# Patient Record
Sex: FEMALE | Race: WHITE | Employment: OTHER | URBAN - METROPOLITAN AREA
[De-identification: names, ages, dates, MRNs, and addresses within clinical notes are randomized per-mention and may not be internally consistent; named-entity substitution may affect disease eponyms.]

---

## 2021-01-01 ENCOUNTER — APPOINTMENT (OUTPATIENT)
Dept: GENERAL RADIOLOGY | Age: 71
End: 2021-01-01
Attending: UROLOGY
Payer: COMMERCIAL

## 2021-01-01 ENCOUNTER — HOSPITAL ENCOUNTER (OUTPATIENT)
Age: 71
Setting detail: OBSERVATION
Discharge: HOME | End: 2021-12-31
Attending: UROLOGY | Admitting: UROLOGY
Payer: COMMERCIAL

## 2021-01-01 ENCOUNTER — HOSPITAL ENCOUNTER (OUTPATIENT)
Age: 71
Setting detail: OUTPATIENT SURGERY
Discharge: HOME | End: 2021-12-03
Attending: UROLOGY | Admitting: UROLOGY
Payer: COMMERCIAL

## 2021-01-01 ENCOUNTER — HOSPITAL ENCOUNTER (OUTPATIENT)
Dept: LAB | Age: 71
Discharge: HOME | End: 2021-12-17
Payer: COMMERCIAL

## 2021-01-01 ENCOUNTER — ANESTHESIA EVENT (OUTPATIENT)
Dept: SURGERY | Age: 71
End: 2021-01-01
Payer: COMMERCIAL

## 2021-01-01 ENCOUNTER — HOSPITAL ENCOUNTER (OUTPATIENT)
Dept: CT IMAGING | Age: 71
Discharge: HOME | End: 2021-12-28
Attending: UROLOGY
Payer: COMMERCIAL

## 2021-01-01 ENCOUNTER — ANESTHESIA (OUTPATIENT)
Dept: SURGERY | Age: 71
End: 2021-01-01
Payer: COMMERCIAL

## 2021-01-01 VITALS
HEART RATE: 77 BPM | BODY MASS INDEX: 21.13 KG/M2 | TEMPERATURE: 97.5 F | OXYGEN SATURATION: 95 % | RESPIRATION RATE: 18 BRPM | WEIGHT: 127 LBS | DIASTOLIC BLOOD PRESSURE: 50 MMHG | SYSTOLIC BLOOD PRESSURE: 98 MMHG

## 2021-01-01 VITALS
HEART RATE: 57 BPM | OXYGEN SATURATION: 95 % | RESPIRATION RATE: 16 BRPM | BODY MASS INDEX: 23.38 KG/M2 | WEIGHT: 132 LBS | TEMPERATURE: 97.8 F | DIASTOLIC BLOOD PRESSURE: 52 MMHG | SYSTOLIC BLOOD PRESSURE: 95 MMHG

## 2021-01-01 DIAGNOSIS — D49.4 BLADDER TUMOR: ICD-10-CM

## 2021-01-01 DIAGNOSIS — C67.8 MALIGNANT NEOPLASM OF OVERLAPPING SITES OF BLADDER (HCC): ICD-10-CM

## 2021-01-01 LAB
ALBUMIN SERPL-MCNC: 2.8 G/DL (ref 3.2–4.6)
ALBUMIN/GLOB SERPL: 0.7 {RATIO} (ref 1.2–3.5)
ALP SERPL-CCNC: 69 U/L (ref 50–136)
ALT SERPL-CCNC: 11 U/L (ref 12–65)
ANION GAP SERPL CALC-SCNC: 3 MMOL/L (ref 7–16)
ANION GAP SERPL CALC-SCNC: 6 MMOL/L (ref 7–16)
ANION GAP SERPL CALC-SCNC: 7 MMOL/L (ref 7–16)
ANION GAP SERPL CALC-SCNC: 7 MMOL/L (ref 7–16)
AST SERPL-CCNC: 6 U/L (ref 15–37)
ATRIAL RATE: 87 BPM
BASOPHILS # BLD: 0 K/UL (ref 0–0.2)
BASOPHILS # BLD: 0.1 K/UL (ref 0–0.2)
BASOPHILS NFR BLD: 0 % (ref 0–2)
BASOPHILS NFR BLD: 1 % (ref 0–2)
BILIRUB SERPL-MCNC: 0.5 MG/DL (ref 0.2–1.1)
BUN SERPL-MCNC: 10 MG/DL (ref 8–23)
BUN SERPL-MCNC: 12 MG/DL (ref 8–23)
BUN SERPL-MCNC: 7 MG/DL (ref 8–23)
BUN SERPL-MCNC: 7 MG/DL (ref 8–23)
CALCIUM SERPL-MCNC: 8.8 MG/DL (ref 8.3–10.4)
CALCIUM SERPL-MCNC: 8.9 MG/DL (ref 8.3–10.4)
CALCIUM SERPL-MCNC: 9.2 MG/DL (ref 8.3–10.4)
CALCIUM SERPL-MCNC: 9.7 MG/DL (ref 8.3–10.4)
CALCULATED P AXIS, ECG09: 15 DEGREES
CALCULATED R AXIS, ECG10: -13 DEGREES
CALCULATED T AXIS, ECG11: 15 DEGREES
CHLORIDE SERPL-SCNC: 101 MMOL/L (ref 98–107)
CHLORIDE SERPL-SCNC: 102 MMOL/L (ref 98–107)
CHLORIDE SERPL-SCNC: 103 MMOL/L (ref 98–107)
CHLORIDE SERPL-SCNC: 104 MMOL/L (ref 98–107)
CO2 SERPL-SCNC: 27 MMOL/L (ref 21–32)
CO2 SERPL-SCNC: 28 MMOL/L (ref 21–32)
CO2 SERPL-SCNC: 28 MMOL/L (ref 21–32)
CO2 SERPL-SCNC: 30 MMOL/L (ref 21–32)
COVID-19 RAPID TEST, COVR: NOT DETECTED
CREAT SERPL-MCNC: 0.71 MG/DL (ref 0.6–1)
CREAT SERPL-MCNC: 0.74 MG/DL (ref 0.6–1)
CREAT SERPL-MCNC: 0.8 MG/DL (ref 0.6–1)
CREAT SERPL-MCNC: 0.95 MG/DL (ref 0.6–1)
DIAGNOSIS, 93000: NORMAL
DIFFERENTIAL METHOD BLD: ABNORMAL
DIFFERENTIAL METHOD BLD: ABNORMAL
EOSINOPHIL # BLD: 0 K/UL (ref 0–0.8)
EOSINOPHIL # BLD: 0.2 K/UL (ref 0–0.8)
EOSINOPHIL NFR BLD: 0 % (ref 0.5–7.8)
EOSINOPHIL NFR BLD: 2 % (ref 0.5–7.8)
ERYTHROCYTE [DISTWIDTH] IN BLOOD BY AUTOMATED COUNT: 15.9 % (ref 11.9–14.6)
ERYTHROCYTE [DISTWIDTH] IN BLOOD BY AUTOMATED COUNT: 15.9 % (ref 11.9–14.6)
ERYTHROCYTE [DISTWIDTH] IN BLOOD BY AUTOMATED COUNT: 16.1 % (ref 11.9–14.6)
ERYTHROCYTE [DISTWIDTH] IN BLOOD BY AUTOMATED COUNT: 16.6 % (ref 11.9–14.6)
GLOBULIN SER CALC-MCNC: 4.1 G/DL (ref 2.3–3.5)
GLUCOSE SERPL-MCNC: 115 MG/DL (ref 65–100)
GLUCOSE SERPL-MCNC: 169 MG/DL (ref 65–100)
GLUCOSE SERPL-MCNC: 92 MG/DL (ref 65–100)
GLUCOSE SERPL-MCNC: 93 MG/DL (ref 65–100)
HCT VFR BLD AUTO: 27.7 % (ref 35.8–46.3)
HCT VFR BLD AUTO: 31.1 % (ref 35.8–46.3)
HCT VFR BLD AUTO: 33.4 % (ref 35.8–46.3)
HCT VFR BLD AUTO: 33.9 % (ref 35.8–46.3)
HGB BLD-MCNC: 10.2 G/DL (ref 11.7–15.4)
HGB BLD-MCNC: 10.4 G/DL (ref 11.7–15.4)
HGB BLD-MCNC: 8.4 G/DL (ref 11.7–15.4)
HGB BLD-MCNC: 9.4 G/DL (ref 11.7–15.4)
IMM GRANULOCYTES # BLD AUTO: 0 K/UL (ref 0–0.5)
IMM GRANULOCYTES # BLD AUTO: 0 K/UL (ref 0–0.5)
IMM GRANULOCYTES NFR BLD AUTO: 0 % (ref 0–5)
IMM GRANULOCYTES NFR BLD AUTO: 0 % (ref 0–5)
LYMPHOCYTES # BLD: 0.5 K/UL (ref 0.5–4.6)
LYMPHOCYTES # BLD: 1.5 K/UL (ref 0.5–4.6)
LYMPHOCYTES NFR BLD: 16 % (ref 13–44)
LYMPHOCYTES NFR BLD: 5 % (ref 13–44)
MCH RBC QN AUTO: 24.5 PG (ref 26.1–32.9)
MCH RBC QN AUTO: 24.9 PG (ref 26.1–32.9)
MCH RBC QN AUTO: 25.1 PG (ref 26.1–32.9)
MCH RBC QN AUTO: 25.1 PG (ref 26.1–32.9)
MCHC RBC AUTO-ENTMCNC: 30.2 G/DL (ref 31.4–35)
MCHC RBC AUTO-ENTMCNC: 30.3 G/DL (ref 31.4–35)
MCHC RBC AUTO-ENTMCNC: 30.5 G/DL (ref 31.4–35)
MCHC RBC AUTO-ENTMCNC: 30.7 G/DL (ref 31.4–35)
MCV RBC AUTO: 80.1 FL (ref 79.6–97.8)
MCV RBC AUTO: 81.1 FL (ref 79.6–97.8)
MCV RBC AUTO: 82.9 FL (ref 79.6–97.8)
MCV RBC AUTO: 83.2 FL (ref 79.6–97.8)
MONOCYTES # BLD: 0.1 K/UL (ref 0.1–1.3)
MONOCYTES # BLD: 0.7 K/UL (ref 0.1–1.3)
MONOCYTES NFR BLD: 2 % (ref 4–12)
MONOCYTES NFR BLD: 7 % (ref 4–12)
NEUTS SEG # BLD: 7.3 K/UL (ref 1.7–8.2)
NEUTS SEG # BLD: 8.5 K/UL (ref 1.7–8.2)
NEUTS SEG NFR BLD: 75 % (ref 43–78)
NEUTS SEG NFR BLD: 93 % (ref 43–78)
NRBC # BLD: 0 K/UL (ref 0–0.2)
P-R INTERVAL, ECG05: 138 MS
PLATELET # BLD AUTO: 238 K/UL (ref 150–450)
PLATELET # BLD AUTO: 260 K/UL (ref 150–450)
PLATELET # BLD AUTO: 264 K/UL (ref 150–450)
PLATELET # BLD AUTO: 280 K/UL (ref 150–450)
PMV BLD AUTO: 9 FL (ref 9.4–12.3)
PMV BLD AUTO: 9.5 FL (ref 9.4–12.3)
PMV BLD AUTO: 9.5 FL (ref 9.4–12.3)
PMV BLD AUTO: 9.7 FL (ref 9.4–12.3)
POTASSIUM SERPL-SCNC: 3.2 MMOL/L (ref 3.5–5.1)
POTASSIUM SERPL-SCNC: 4 MMOL/L (ref 3.5–5.1)
POTASSIUM SERPL-SCNC: 4.3 MMOL/L (ref 3.5–5.1)
POTASSIUM SERPL-SCNC: 4.8 MMOL/L (ref 3.5–5.1)
PROT SERPL-MCNC: 6.9 G/DL (ref 6.3–8.2)
Q-T INTERVAL, ECG07: 368 MS
QRS DURATION, ECG06: 88 MS
QTC CALCULATION (BEZET), ECG08: 442 MS
RBC # BLD AUTO: 3.34 M/UL (ref 4.05–5.2)
RBC # BLD AUTO: 3.74 M/UL (ref 4.05–5.2)
RBC # BLD AUTO: 4.17 M/UL (ref 4.05–5.2)
RBC # BLD AUTO: 4.18 M/UL (ref 4.05–5.2)
SODIUM SERPL-SCNC: 135 MMOL/L (ref 136–145)
SODIUM SERPL-SCNC: 136 MMOL/L (ref 136–145)
SODIUM SERPL-SCNC: 137 MMOL/L (ref 136–145)
SODIUM SERPL-SCNC: 138 MMOL/L (ref 136–145)
SOURCE, COVRS: NORMAL
VENTRICULAR RATE, ECG03: 87 BPM
WBC # BLD AUTO: 10.2 K/UL (ref 4.3–11.1)
WBC # BLD AUTO: 13.2 K/UL (ref 4.3–11.1)
WBC # BLD AUTO: 9.2 K/UL (ref 4.3–11.1)
WBC # BLD AUTO: 9.8 K/UL (ref 4.3–11.1)

## 2021-01-01 PROCEDURE — 77030041444 HC ELECTRD PSS QUICK-FIRE PSSU -D: Performed by: UROLOGY

## 2021-01-01 PROCEDURE — 74011000250 HC RX REV CODE- 250: Performed by: NURSE ANESTHETIST, CERTIFIED REGISTERED

## 2021-01-01 PROCEDURE — G0378 HOSPITAL OBSERVATION PER HR: HCPCS

## 2021-01-01 PROCEDURE — 74011000258 HC RX REV CODE- 258: Performed by: UROLOGY

## 2021-01-01 PROCEDURE — 85027 COMPLETE CBC AUTOMATED: CPT

## 2021-01-01 PROCEDURE — 88307 TISSUE EXAM BY PATHOLOGIST: CPT

## 2021-01-01 PROCEDURE — 77030019927 HC TBNG IRR CYSTO BAXT -A: Performed by: UROLOGY

## 2021-01-01 PROCEDURE — 76060000034 HC ANESTHESIA 1.5 TO 2 HR: Performed by: UROLOGY

## 2021-01-01 PROCEDURE — 80048 BASIC METABOLIC PNL TOTAL CA: CPT

## 2021-01-01 PROCEDURE — 2709999900 HC NON-CHARGEABLE SUPPLY: Performed by: UROLOGY

## 2021-01-01 PROCEDURE — 71045 X-RAY EXAM CHEST 1 VIEW: CPT

## 2021-01-01 PROCEDURE — 77030019908 HC STETH ESOPH SIMS -A: Performed by: ANESTHESIOLOGY

## 2021-01-01 PROCEDURE — 74011250637 HC RX REV CODE- 250/637: Performed by: UROLOGY

## 2021-01-01 PROCEDURE — 74011250636 HC RX REV CODE- 250/636: Performed by: UROLOGY

## 2021-01-01 PROCEDURE — 74011250636 HC RX REV CODE- 250/636: Performed by: ANESTHESIOLOGY

## 2021-01-01 PROCEDURE — 88305 TISSUE EXAM BY PATHOLOGIST: CPT

## 2021-01-01 PROCEDURE — 74011250636 HC RX REV CODE- 250/636: Performed by: NURSE ANESTHETIST, CERTIFIED REGISTERED

## 2021-01-01 PROCEDURE — 77030005546 HC CATH URETH FOL 3W BARD -A: Performed by: UROLOGY

## 2021-01-01 PROCEDURE — 77030040361 HC SLV COMPR DVT MDII -B: Performed by: UROLOGY

## 2021-01-01 PROCEDURE — 74011250637 HC RX REV CODE- 250/637: Performed by: ANESTHESIOLOGY

## 2021-01-01 PROCEDURE — 77030040831 HC BAG URINE DRNG MDII -A: Performed by: UROLOGY

## 2021-01-01 PROCEDURE — 74011000250 HC RX REV CODE- 250: Performed by: UROLOGY

## 2021-01-01 PROCEDURE — 76210000016 HC OR PH I REC 1 TO 1.5 HR: Performed by: UROLOGY

## 2021-01-01 PROCEDURE — 77030010509 HC AIRWY LMA MSK TELE -A: Performed by: ANESTHESIOLOGY

## 2021-01-01 PROCEDURE — 77030008703 HC TU ET UNCUF COVD -A: Performed by: ANESTHESIOLOGY

## 2021-01-01 PROCEDURE — 77030018836 HC SOL IRR NACL ICUM -A: Performed by: UROLOGY

## 2021-01-01 PROCEDURE — 71260 CT THORAX DX C+: CPT

## 2021-01-01 PROCEDURE — 85025 COMPLETE CBC W/AUTO DIFF WBC: CPT

## 2021-01-01 PROCEDURE — 80053 COMPREHEN METABOLIC PANEL: CPT

## 2021-01-01 PROCEDURE — 36415 COLL VENOUS BLD VENIPUNCTURE: CPT

## 2021-01-01 PROCEDURE — 94640 AIRWAY INHALATION TREATMENT: CPT

## 2021-01-01 PROCEDURE — 93005 ELECTROCARDIOGRAM TRACING: CPT | Performed by: UROLOGY

## 2021-01-01 PROCEDURE — 76210000006 HC OR PH I REC 0.5 TO 1 HR: Performed by: UROLOGY

## 2021-01-01 PROCEDURE — 76010000171 HC OR TIME 2 TO 2.5 HR INTENSV-TIER 1: Performed by: UROLOGY

## 2021-01-01 PROCEDURE — 74011000636 HC RX REV CODE- 636: Performed by: UROLOGY

## 2021-01-01 PROCEDURE — 76010000149 HC OR TIME 1 TO 1.5 HR: Performed by: UROLOGY

## 2021-01-01 PROCEDURE — 77030039425 HC BLD LARYNG TRULITE DISP TELE -A: Performed by: ANESTHESIOLOGY

## 2021-01-01 PROCEDURE — 77030022427 HC ELECTRD RESCTCS CT STOR -C: Performed by: UROLOGY

## 2021-01-01 PROCEDURE — 2709999900 HC NON-CHARGEABLE SUPPLY

## 2021-01-01 PROCEDURE — 76060000035 HC ANESTHESIA 2 TO 2.5 HR: Performed by: UROLOGY

## 2021-01-01 PROCEDURE — 52240 CYSTOSCOPY AND TREATMENT: CPT | Performed by: UROLOGY

## 2021-01-01 PROCEDURE — 94664 DEMO&/EVAL PT USE INHALER: CPT

## 2021-01-01 PROCEDURE — 76210000021 HC REC RM PH II 0.5 TO 1 HR: Performed by: UROLOGY

## 2021-01-01 PROCEDURE — 87635 SARS-COV-2 COVID-19 AMP PRB: CPT

## 2021-01-01 PROCEDURE — 77030018726 HC ELECTRD BALL COAG STOR -C: Performed by: UROLOGY

## 2021-01-01 PROCEDURE — 77030040830 HC CATH URETH FOL MDII -A: Performed by: UROLOGY

## 2021-01-01 PROCEDURE — 74011000250 HC RX REV CODE- 250: Performed by: ANESTHESIOLOGY

## 2021-01-01 RX ORDER — MIDAZOLAM HYDROCHLORIDE 1 MG/ML
2 INJECTION, SOLUTION INTRAMUSCULAR; INTRAVENOUS ONCE
Status: DISCONTINUED | OUTPATIENT
Start: 2021-01-01 | End: 2021-01-01 | Stop reason: HOSPADM

## 2021-01-01 RX ORDER — AMOXICILLIN 250 MG
1 CAPSULE ORAL
Status: DISCONTINUED | OUTPATIENT
Start: 2021-01-01 | End: 2021-01-01 | Stop reason: HOSPADM

## 2021-01-01 RX ORDER — FAMOTIDINE 20 MG/1
20 TABLET, FILM COATED ORAL ONCE
Status: COMPLETED | OUTPATIENT
Start: 2021-01-01 | End: 2021-01-01

## 2021-01-01 RX ORDER — LIDOCAINE HYDROCHLORIDE 20 MG/ML
INJECTION, SOLUTION EPIDURAL; INFILTRATION; INTRACAUDAL; PERINEURAL AS NEEDED
Status: DISCONTINUED | OUTPATIENT
Start: 2021-01-01 | End: 2021-01-01 | Stop reason: HOSPADM

## 2021-01-01 RX ORDER — PROPOFOL 10 MG/ML
INJECTION, EMULSION INTRAVENOUS AS NEEDED
Status: DISCONTINUED | OUTPATIENT
Start: 2021-01-01 | End: 2021-01-01 | Stop reason: HOSPADM

## 2021-01-01 RX ORDER — NALOXONE HYDROCHLORIDE 0.4 MG/ML
0.2 INJECTION, SOLUTION INTRAMUSCULAR; INTRAVENOUS; SUBCUTANEOUS
Status: DISCONTINUED | OUTPATIENT
Start: 2021-01-01 | End: 2021-01-01 | Stop reason: HOSPADM

## 2021-01-01 RX ORDER — HYDROCODONE BITARTRATE AND ACETAMINOPHEN 5; 325 MG/1; MG/1
1 TABLET ORAL
Qty: 10 TABLET | Refills: 0 | Status: SHIPPED | OUTPATIENT
Start: 2021-01-01 | End: 2022-01-01

## 2021-01-01 RX ORDER — ROCURONIUM BROMIDE 10 MG/ML
INJECTION, SOLUTION INTRAVENOUS AS NEEDED
Status: DISCONTINUED | OUTPATIENT
Start: 2021-01-01 | End: 2021-01-01 | Stop reason: HOSPADM

## 2021-01-01 RX ORDER — SODIUM CHLORIDE 0.9 % (FLUSH) 0.9 %
5-40 SYRINGE (ML) INJECTION EVERY 8 HOURS
Status: DISCONTINUED | OUTPATIENT
Start: 2021-01-01 | End: 2021-01-01 | Stop reason: HOSPADM

## 2021-01-01 RX ORDER — OXYBUTYNIN CHLORIDE 5 MG/1
10 TABLET, EXTENDED RELEASE ORAL DAILY
Status: DISCONTINUED | OUTPATIENT
Start: 2021-01-01 | End: 2021-01-01 | Stop reason: HOSPADM

## 2021-01-01 RX ORDER — ESMOLOL HYDROCHLORIDE 10 MG/ML
INJECTION INTRAVENOUS AS NEEDED
Status: DISCONTINUED | OUTPATIENT
Start: 2021-01-01 | End: 2021-01-01 | Stop reason: HOSPADM

## 2021-01-01 RX ORDER — ALBUTEROL SULFATE 0.83 MG/ML
SOLUTION RESPIRATORY (INHALATION)
Status: DISCONTINUED
Start: 2021-01-01 | End: 2021-01-01 | Stop reason: WASHOUT

## 2021-01-01 RX ORDER — SODIUM CHLORIDE 0.9 % (FLUSH) 0.9 %
10 SYRINGE (ML) INJECTION
Status: COMPLETED | OUTPATIENT
Start: 2021-01-01 | End: 2021-01-01

## 2021-01-01 RX ORDER — HYDROMORPHONE HYDROCHLORIDE 2 MG/ML
0.5 INJECTION, SOLUTION INTRAMUSCULAR; INTRAVENOUS; SUBCUTANEOUS
Status: DISCONTINUED | OUTPATIENT
Start: 2021-01-01 | End: 2021-01-01 | Stop reason: HOSPADM

## 2021-01-01 RX ORDER — HYDROCODONE BITARTRATE AND ACETAMINOPHEN 5; 325 MG/1; MG/1
1 TABLET ORAL
Status: DISCONTINUED | OUTPATIENT
Start: 2021-01-01 | End: 2021-01-01 | Stop reason: HOSPADM

## 2021-01-01 RX ORDER — ONDANSETRON 2 MG/ML
INJECTION INTRAMUSCULAR; INTRAVENOUS AS NEEDED
Status: DISCONTINUED | OUTPATIENT
Start: 2021-01-01 | End: 2021-01-01 | Stop reason: HOSPADM

## 2021-01-01 RX ORDER — LIDOCAINE HYDROCHLORIDE 10 MG/ML
0.1 INJECTION INFILTRATION; PERINEURAL AS NEEDED
Status: DISCONTINUED | OUTPATIENT
Start: 2021-01-01 | End: 2021-01-01 | Stop reason: HOSPADM

## 2021-01-01 RX ORDER — ACETAMINOPHEN 325 MG/1
650 TABLET ORAL
Status: DISCONTINUED | OUTPATIENT
Start: 2021-01-01 | End: 2021-01-01 | Stop reason: HOSPADM

## 2021-01-01 RX ORDER — GENTAMICIN SULFATE 80 MG/100ML
80 INJECTION, SOLUTION INTRAVENOUS
Status: COMPLETED | OUTPATIENT
Start: 2021-01-01 | End: 2021-01-01

## 2021-01-01 RX ORDER — ONDANSETRON 2 MG/ML
4 INJECTION INTRAMUSCULAR; INTRAVENOUS
Status: DISCONTINUED | OUTPATIENT
Start: 2021-01-01 | End: 2021-01-01 | Stop reason: HOSPADM

## 2021-01-01 RX ORDER — DEXAMETHASONE SODIUM PHOSPHATE 100 MG/10ML
INJECTION INTRAMUSCULAR; INTRAVENOUS AS NEEDED
Status: DISCONTINUED | OUTPATIENT
Start: 2021-01-01 | End: 2021-01-01 | Stop reason: HOSPADM

## 2021-01-01 RX ORDER — ALBUTEROL SULFATE 2.5 MG/.5ML
2.5 SOLUTION RESPIRATORY (INHALATION)
Status: COMPLETED | OUTPATIENT
Start: 2021-01-01 | End: 2021-01-01

## 2021-01-01 RX ORDER — OXYCODONE HYDROCHLORIDE 5 MG/1
5 TABLET ORAL
Status: DISCONTINUED | OUTPATIENT
Start: 2021-01-01 | End: 2021-01-01 | Stop reason: HOSPADM

## 2021-01-01 RX ORDER — ATROPA BELLADONNA AND OPIUM 16.2; 6 MG/1; MG/1
SUPPOSITORY RECTAL AS NEEDED
Status: DISCONTINUED | OUTPATIENT
Start: 2021-01-01 | End: 2021-01-01 | Stop reason: HOSPADM

## 2021-01-01 RX ORDER — LEVOFLOXACIN 5 MG/ML
500 INJECTION, SOLUTION INTRAVENOUS ONCE
Status: COMPLETED | OUTPATIENT
Start: 2021-01-01 | End: 2021-01-01

## 2021-01-01 RX ORDER — EPHEDRINE SULFATE/0.9% NACL/PF 50 MG/5 ML
SYRINGE (ML) INTRAVENOUS AS NEEDED
Status: DISCONTINUED | OUTPATIENT
Start: 2021-01-01 | End: 2021-01-01 | Stop reason: HOSPADM

## 2021-01-01 RX ORDER — MIDAZOLAM HYDROCHLORIDE 1 MG/ML
2 INJECTION, SOLUTION INTRAMUSCULAR; INTRAVENOUS
Status: DISCONTINUED | OUTPATIENT
Start: 2021-01-01 | End: 2021-01-01 | Stop reason: HOSPADM

## 2021-01-01 RX ORDER — HALOPERIDOL 5 MG/ML
1 INJECTION INTRAMUSCULAR
Status: DISCONTINUED | OUTPATIENT
Start: 2021-01-01 | End: 2021-01-01 | Stop reason: HOSPADM

## 2021-01-01 RX ORDER — SODIUM CHLORIDE, SODIUM LACTATE, POTASSIUM CHLORIDE, CALCIUM CHLORIDE 600; 310; 30; 20 MG/100ML; MG/100ML; MG/100ML; MG/100ML
75 INJECTION, SOLUTION INTRAVENOUS CONTINUOUS
Status: DISCONTINUED | OUTPATIENT
Start: 2021-01-01 | End: 2021-01-01 | Stop reason: HOSPADM

## 2021-01-01 RX ORDER — SODIUM CHLORIDE 0.9 % (FLUSH) 0.9 %
5-40 SYRINGE (ML) INJECTION AS NEEDED
Status: DISCONTINUED | OUTPATIENT
Start: 2021-01-01 | End: 2021-01-01 | Stop reason: HOSPADM

## 2021-01-01 RX ORDER — ACETAMINOPHEN 500 MG
1000 TABLET ORAL ONCE
Status: COMPLETED | OUTPATIENT
Start: 2021-01-01 | End: 2021-01-01

## 2021-01-01 RX ORDER — DEXAMETHASONE SODIUM PHOSPHATE 4 MG/ML
INJECTION, SOLUTION INTRA-ARTICULAR; INTRALESIONAL; INTRAMUSCULAR; INTRAVENOUS; SOFT TISSUE AS NEEDED
Status: DISCONTINUED | OUTPATIENT
Start: 2021-01-01 | End: 2021-01-01 | Stop reason: HOSPADM

## 2021-01-01 RX ORDER — NALOXONE HYDROCHLORIDE 0.4 MG/ML
0.2 INJECTION, SOLUTION INTRAMUSCULAR; INTRAVENOUS; SUBCUTANEOUS AS NEEDED
Status: DISCONTINUED | OUTPATIENT
Start: 2021-01-01 | End: 2021-01-01 | Stop reason: HOSPADM

## 2021-01-01 RX ORDER — SODIUM CHLORIDE, SODIUM LACTATE, POTASSIUM CHLORIDE, CALCIUM CHLORIDE 600; 310; 30; 20 MG/100ML; MG/100ML; MG/100ML; MG/100ML
25 INJECTION, SOLUTION INTRAVENOUS CONTINUOUS
Status: DISCONTINUED | OUTPATIENT
Start: 2021-01-01 | End: 2021-01-01 | Stop reason: HOSPADM

## 2021-01-01 RX ORDER — LEVOFLOXACIN 5 MG/ML
500 INJECTION, SOLUTION INTRAVENOUS EVERY 24 HOURS
Status: DISCONTINUED | OUTPATIENT
Start: 2021-01-01 | End: 2021-01-01 | Stop reason: HOSPADM

## 2021-01-01 RX ORDER — OXYCODONE HYDROCHLORIDE 5 MG/1
10 TABLET ORAL
Status: DISCONTINUED | OUTPATIENT
Start: 2021-01-01 | End: 2021-01-01 | Stop reason: HOSPADM

## 2021-01-01 RX ORDER — FENTANYL CITRATE 50 UG/ML
100 INJECTION, SOLUTION INTRAMUSCULAR; INTRAVENOUS ONCE
Status: DISCONTINUED | OUTPATIENT
Start: 2021-01-01 | End: 2021-01-01 | Stop reason: HOSPADM

## 2021-01-01 RX ORDER — HYDROMORPHONE HYDROCHLORIDE 2 MG/ML
INJECTION, SOLUTION INTRAMUSCULAR; INTRAVENOUS; SUBCUTANEOUS AS NEEDED
Status: DISCONTINUED | OUTPATIENT
Start: 2021-01-01 | End: 2021-01-01 | Stop reason: HOSPADM

## 2021-01-01 RX ORDER — DIPHENHYDRAMINE HYDROCHLORIDE 50 MG/ML
12.5 INJECTION, SOLUTION INTRAMUSCULAR; INTRAVENOUS
Status: DISCONTINUED | OUTPATIENT
Start: 2021-01-01 | End: 2021-01-01 | Stop reason: HOSPADM

## 2021-01-01 RX ORDER — HYDROMORPHONE HYDROCHLORIDE 1 MG/ML
0.5 INJECTION, SOLUTION INTRAMUSCULAR; INTRAVENOUS; SUBCUTANEOUS
Status: DISCONTINUED | OUTPATIENT
Start: 2021-01-01 | End: 2021-01-01 | Stop reason: HOSPADM

## 2021-01-01 RX ADMIN — PROPOFOL 30 MG: 10 INJECTION, EMULSION INTRAVENOUS at 09:12

## 2021-01-01 RX ADMIN — ESMOLOL HYDROCHLORIDE 30 MG: 10 INJECTION, SOLUTION INTRAVENOUS at 09:13

## 2021-01-01 RX ADMIN — PHENYLEPHRINE HYDROCHLORIDE 100 MCG: 10 INJECTION INTRAVENOUS at 11:00

## 2021-01-01 RX ADMIN — Medication 10 ML: at 08:57

## 2021-01-01 RX ADMIN — DEXAMETHASONE SODIUM PHOSPHATE 4 MG: 4 INJECTION, SOLUTION INTRAMUSCULAR; INTRAVENOUS at 11:19

## 2021-01-01 RX ADMIN — ESMOLOL HYDROCHLORIDE 10 MG: 10 INJECTION, SOLUTION INTRAVENOUS at 09:31

## 2021-01-01 RX ADMIN — PROPOFOL 30 MG: 10 INJECTION, EMULSION INTRAVENOUS at 11:15

## 2021-01-01 RX ADMIN — PHENYLEPHRINE HYDROCHLORIDE 100 MCG: 10 INJECTION INTRAVENOUS at 10:27

## 2021-01-01 RX ADMIN — Medication 10 MG: at 10:42

## 2021-01-01 RX ADMIN — PHENYLEPHRINE HYDROCHLORIDE 100 MCG: 10 INJECTION INTRAVENOUS at 09:44

## 2021-01-01 RX ADMIN — PHENYLEPHRINE HYDROCHLORIDE 100 MCG: 10 INJECTION INTRAVENOUS at 10:12

## 2021-01-01 RX ADMIN — ROCURONIUM BROMIDE 30 MG: 50 INJECTION, SOLUTION INTRAVENOUS at 09:09

## 2021-01-01 RX ADMIN — HYDROMORPHONE HYDROCHLORIDE 0.5 MG: 1 INJECTION, SOLUTION INTRAMUSCULAR; INTRAVENOUS; SUBCUTANEOUS at 01:44

## 2021-01-01 RX ADMIN — Medication 10 ML: at 05:31

## 2021-01-01 RX ADMIN — FAMOTIDINE 20 MG: 20 TABLET ORAL at 07:59

## 2021-01-01 RX ADMIN — HYDROCODONE BITARTRATE AND ACETAMINOPHEN 1 TABLET: 5; 325 TABLET ORAL at 10:22

## 2021-01-01 RX ADMIN — Medication 10 ML: at 21:15

## 2021-01-01 RX ADMIN — IOPAMIDOL 100 ML: 755 INJECTION, SOLUTION INTRAVENOUS at 08:56

## 2021-01-01 RX ADMIN — PHENYLEPHRINE HYDROCHLORIDE 100 MCG: 10 INJECTION INTRAVENOUS at 10:46

## 2021-01-01 RX ADMIN — ONDANSETRON 4 MG: 2 INJECTION INTRAMUSCULAR; INTRAVENOUS at 11:27

## 2021-01-01 RX ADMIN — SUGAMMADEX 100 MG: 100 INJECTION, SOLUTION INTRAVENOUS at 11:02

## 2021-01-01 RX ADMIN — PROPOFOL 120 MG: 10 INJECTION, EMULSION INTRAVENOUS at 10:19

## 2021-01-01 RX ADMIN — DEXAMETHASONE SODIUM PHOSPHATE 10 MG: 10 INJECTION INTRAMUSCULAR; INTRAVENOUS at 09:18

## 2021-01-01 RX ADMIN — LIDOCAINE HYDROCHLORIDE 60 MG: 20 INJECTION, SOLUTION EPIDURAL; INFILTRATION; INTRACAUDAL; PERINEURAL at 09:09

## 2021-01-01 RX ADMIN — ONDANSETRON 4 MG: 2 INJECTION INTRAMUSCULAR; INTRAVENOUS at 09:39

## 2021-01-01 RX ADMIN — PROPOFOL 150 MG: 10 INJECTION, EMULSION INTRAVENOUS at 09:09

## 2021-01-01 RX ADMIN — Medication 10 MG: at 10:40

## 2021-01-01 RX ADMIN — HYDROMORPHONE HYDROCHLORIDE 0.5 MG: 1 INJECTION, SOLUTION INTRAMUSCULAR; INTRAVENOUS; SUBCUTANEOUS at 15:01

## 2021-01-01 RX ADMIN — SODIUM CHLORIDE 100 ML: 9 INJECTION, SOLUTION INTRAVENOUS at 08:57

## 2021-01-01 RX ADMIN — SUGAMMADEX 100 MG: 100 INJECTION, SOLUTION INTRAVENOUS at 11:05

## 2021-01-01 RX ADMIN — HYDROCODONE BITARTRATE AND ACETAMINOPHEN 1 TABLET: 5; 325 TABLET ORAL at 22:37

## 2021-01-01 RX ADMIN — DEXTROSE MONOHYDRATE: 25 INJECTION, SOLUTION INTRAVENOUS at 16:52

## 2021-01-01 RX ADMIN — PHENYLEPHRINE HYDROCHLORIDE 100 MCG: 10 INJECTION INTRAVENOUS at 10:36

## 2021-01-01 RX ADMIN — PHENYLEPHRINE HYDROCHLORIDE 100 MCG: 10 INJECTION INTRAVENOUS at 10:45

## 2021-01-01 RX ADMIN — GENTAMICIN SULFATE 80 MG: 80 INJECTION, SOLUTION INTRAVENOUS at 10:27

## 2021-01-01 RX ADMIN — HYDROMORPHONE HYDROCHLORIDE 0.5 MG: 1 INJECTION, SOLUTION INTRAMUSCULAR; INTRAVENOUS; SUBCUTANEOUS at 20:37

## 2021-01-01 RX ADMIN — GENTAMICIN SULFATE 80 MG: 80 INJECTION, SOLUTION INTRAVENOUS at 09:30

## 2021-01-01 RX ADMIN — ACETAMINOPHEN 1000 MG: 500 TABLET ORAL at 09:47

## 2021-01-01 RX ADMIN — PHENYLEPHRINE HYDROCHLORIDE 100 MCG: 10 INJECTION INTRAVENOUS at 10:37

## 2021-01-01 RX ADMIN — ALBUTEROL SULFATE 2.5 MG: 2.5 SOLUTION RESPIRATORY (INHALATION) at 08:48

## 2021-01-01 RX ADMIN — Medication 10 MG: at 10:46

## 2021-01-01 RX ADMIN — ROCURONIUM BROMIDE 10 MG: 50 INJECTION, SOLUTION INTRAVENOUS at 10:00

## 2021-01-01 RX ADMIN — CEFTRIAXONE SODIUM 2 G: 2 INJECTION, POWDER, FOR SOLUTION INTRAMUSCULAR; INTRAVENOUS at 10:12

## 2021-01-01 RX ADMIN — OXYBUTYNIN CHLORIDE 10 MG: 5 TABLET, EXTENDED RELEASE ORAL at 10:22

## 2021-01-01 RX ADMIN — DEXTROSE MONOHYDRATE: 25 INJECTION, SOLUTION INTRAVENOUS at 03:23

## 2021-01-01 RX ADMIN — HYDROMORPHONE HYDROCHLORIDE 0.5 MG: 2 INJECTION INTRAMUSCULAR; INTRAVENOUS; SUBCUTANEOUS at 09:21

## 2021-01-01 RX ADMIN — LEVOFLOXACIN 500 MG: 5 INJECTION, SOLUTION INTRAVENOUS at 09:02

## 2021-01-01 RX ADMIN — PHENYLEPHRINE HYDROCHLORIDE 100 MCG: 10 INJECTION INTRAVENOUS at 10:34

## 2021-01-01 RX ADMIN — ESMOLOL HYDROCHLORIDE 10 MG: 10 INJECTION, SOLUTION INTRAVENOUS at 10:01

## 2021-01-01 RX ADMIN — SODIUM CHLORIDE, SODIUM LACTATE, POTASSIUM CHLORIDE, AND CALCIUM CHLORIDE 75 ML/HR: 600; 310; 30; 20 INJECTION, SOLUTION INTRAVENOUS at 08:13

## 2021-01-01 RX ADMIN — LIDOCAINE HYDROCHLORIDE 60 MG: 20 INJECTION, SOLUTION EPIDURAL; INFILTRATION; INTRACAUDAL; PERINEURAL at 10:19

## 2021-01-01 RX ADMIN — PHENYLEPHRINE HYDROCHLORIDE 100 MCG: 10 INJECTION INTRAVENOUS at 10:41

## 2021-01-01 RX ADMIN — Medication 10 ML: at 15:03

## 2021-01-01 RX ADMIN — ROCURONIUM BROMIDE 10 MG: 50 INJECTION, SOLUTION INTRAVENOUS at 09:20

## 2021-01-01 RX ADMIN — SODIUM CHLORIDE, SODIUM LACTATE, POTASSIUM CHLORIDE, AND CALCIUM CHLORIDE 25 ML/HR: 600; 310; 30; 20 INJECTION, SOLUTION INTRAVENOUS at 09:30

## 2021-12-03 NOTE — PERIOP NOTES
Per Shea Spain MD to keep CBI off at this time to monitor bleeding. Pt to be observed for 1 hour at least and will reevaluated by Shea Spain MD for need of CBI and admittance or discharge.

## 2021-12-03 NOTE — ANESTHESIA POSTPROCEDURE EVALUATION
Procedure(s):  TRANSURETHRAL RESECTION OF BLADDER TUMOR WITH BIPOLAR/ MEAPLASTY/ MEATOTOMY. general    Anesthesia Post Evaluation      Multimodal analgesia: multimodal analgesia used between 6 hours prior to anesthesia start to PACU discharge  Patient location during evaluation: bedside  Patient participation: complete - patient participated  Level of consciousness: awake and alert  Pain score: 1  Pain management: adequate  Airway patency: patent  Anesthetic complications: no  Cardiovascular status: acceptable  Respiratory status: acceptable  Hydration status: acceptable  Comments: Pt doing well. Ok to d/c from an anesthetic prospective. Surgeon to decide if he feels pt needs to be an inpatient. No anesthetic related issues. Post anesthesia nausea and vomiting:  none  Final Post Anesthesia Temperature Assessment:  Normothermia (36.0-37.5 degrees C)      INITIAL Post-op Vital signs:   Vitals Value Taken Time   /51 12/03/21 1207   Temp 36.3 °C (97.4 °F) 12/03/21 1150   Pulse 81 12/03/21 1208   Resp 16 12/03/21 1150   SpO2 96 % 12/03/21 1208   Vitals shown include unvalidated device data.

## 2021-12-03 NOTE — ANESTHESIA PREPROCEDURE EVALUATION
Relevant Problems   No relevant active problems       Anesthetic History   No history of anesthetic complications            Review of Systems / Medical History  Patient summary reviewed and pertinent labs reviewed    Pulmonary        Sleep apnea: No treatment  Smoker (Former heavy smoker)         Neuro/Psych              Cardiovascular                  Exercise tolerance: >4 METS  Comments: Denies CP or recent changes in functional status  TTE 2019 with EF 45-50%;  Per pt, previously 20%     GI/Hepatic/Renal  Within defined limits              Endo/Other  Within defined limits           Other Findings   Comments: Bladder ca           Physical Exam    Airway  Mallampati: II  TM Distance: 4 - 6 cm  Neck ROM: normal range of motion   Mouth opening: Normal     Cardiovascular    Rhythm: regular  Rate: normal         Dental    Dentition: Poor dentition  Comments: VERY POOR DENTITION   Pulmonary      Decreased breath sounds: bilateral           Abdominal  GI exam deferred       Other Findings            Anesthetic Plan    ASA: 3  Anesthesia type: general          Induction: Intravenous  Anesthetic plan and risks discussed with: Patient and Spouse

## 2021-12-03 NOTE — H&P
[address] [facility]  HISTORY AND PHYSICAL    Name:  Camilo Johns  MR#:  [id number]  :  1950  ACCOUNT #:  [de-identified]  ADMIT DATE:  2021    HISTORY OF PRESENT ILLNESS:  The patient is a 42-year-old white female with a long history of tobacco abuse. The patient dates the onset of her problems to about one year ago when she was first treated for urinary tract infection. Her symptoms at that time were dysuria and hematuria. The patient continued to have progressively significant symptoms of lower abdominal pain and discomfort. On 2021, the patient underwent a contrasted abdominopelvic CT scan showing a 7.1 cm mass in the urinary bladder which enhanced in addition to an additional lesion in the right hemipelvis exterior to the urinary bladder which also enhanced. These lesions were as strongly suggestive of malignancy. On 2021, the patient was seen in my office. At that time, the patient had a palpable lower abdominal mass in the pelvis. On bimanual examination, the patient had a very large, tender mass present in the pelvis. A catheterized urine for residual yielded a lot of debris present in the urine in addition to RBCs and WBCs, TNTC. A culture on this urine was no growth. The patient states she has experienced a large weight loss over the past eight months. The patient complains of intermittent colicky-type pain in the pelvis and severe dysuria associated with urgency and urge incontinence, frequency and nocturia every hour. ALLERGIES:  DOXYCYCLINE, FENTANYL, MORPHINE. PRESCRIPTION MEDICATIONS:  None. PAST HISTORY:  The patient has had surgery on her left hand for removal of the index finger and transplantation of the left great toe to the left thumb base. She also has undergone cholecystectomy, and spontaneous passage of a urinary calculus. The patient states that she smoked for about 24 years up to one carton per day.     Additional past medical history, family history, review of systems, see records. PHYSICAL EXAMINATION:  VITAL SIGNS:  Blood pressure 106/76, weight 133, pulse 94. HEENT:  Physiologic except for oral cavity with teeth and very poor repair. NECK:  Supple. Thyroid not palpable. CHEST:  Clear to auscultation. HEART:  Regular without murmur. ABDOMEN:  The patient has no upper abdominal abnormalities. A large central suprapubic mass is palpable which the patient maintains is quite tender. No other masses are palpable. GENITALIA:  Normal female genitalia. Bimanual examination reveals a large pelvic mass which is fluctuant in nature. EXTREMITIES:  The left great toe is absent. The left hand shows a transplanted great toe to thumb stump and missing index finger. There is no significant motion to the transplanted great toe. Right hand is normal.  Pulses equal in all extremities. No other abnormalities are noted. IMPRESSION:  Large bladder mass, apparently malignant based on findings on contrasted CT scan. PLAN:  The patient comes in at this time for a transurethral biopsy of the mass. I have discussed the case with Dr. Merribeth Lombard. We will obtain a sufficient specimen for diagnosis. Since the patient has an extravesical lesion, it is not essential that we obtain muscle wall/bladder wall to determine whether or not there is any invasion to the bladder wall. Resection will be limited and no attempt will be made to resect the entire tumor. ADDENDUM:  The patient has a congenitally absent left kidney.       Tom Moreira MD      [id number]  D:  12/02/2021 22:21  T:  12/03/2021 2:43  JOB #:  [id number]

## 2021-12-03 NOTE — ADDENDUM NOTE
"Chief Complaint  Tremors    Subjective          Hu Burgess presents to [facility]  History of Present Illness  Tremor mainly in the right upper extremity at rest.  Low frequency in nature.  It does not necessarily get worse with intention or activity.  No bradykinesia or rigidity obvious  Bulge in the right inguinal area.  Not painful but noticeable  Objective   Vital Signs:   /60 (BP Location: Left arm, Patient Position: Sitting, Cuff Size: Large Adult)   Pulse 65   Temp 97.1 °F (36.2 °C) (Temporal)   Resp 16   Ht 188 cm (74\")   Wt 85.3 kg (188 lb)   SpO2 97%   BMI 24.14 kg/m²     Physical Exam  Vitals and nursing note reviewed.   Constitutional:       Appearance: Normal appearance.   HENT:      Head: Normocephalic and atraumatic.   Cardiovascular:      Rate and Rhythm: Normal rate and regular rhythm.   Pulmonary:      Effort: Pulmonary effort is normal.      Breath sounds: Normal breath sounds.   Abdominal:      General: Abdomen is flat.      Palpations: Abdomen is soft.   Musculoskeletal:         General: No swelling or deformity.      Cervical back: Neck supple.      Right lower leg: No edema.      Left lower leg: No edema.   Skin:     General: Skin is warm.      Capillary Refill: Capillary refill takes less than 2 seconds.      Findings: No rash.   Neurological:      Mental Status: He is alert and oriented to person, place, and time. Mental status is at baseline.      Cranial Nerves: No cranial nerve deficit.      Sensory: No sensory deficit.      Motor: No weakness.      Gait: Gait normal.      Comments: He does have a resting tremor somewhat pill-rolling in nature.  Does not necessarily get worse with intention.  Cerebellar finger-to-nose essentially normal.  Gait is normal        Result Review : Previous notes                Assessment and Plan Parkinson-like tremor with history of benign tremor preceding.  At this point we talked about the " Addended by: Carine Thompson on: 12/3/2021 07:33 AM     Modules accepted: Orders different options.  Would probably avoid Sinemet at least for now.  Primidone 50 mg to see if he has any improvement of the tremor.  MRI of the brain.  Schedule neurology opinion.  Follow-up in 4 weeks  Asymptomatic right inguinal hernia.  Schedule surgical consult if needs it in the future.  No urgency to corrected at this point  Chronic renal disease stable  Diagnoses and all orders for this visit:    1. Chronic kidney disease, unspecified CKD stage (Primary)    2. Essential hypertension    3. Parkinsonian tremor (HCC)  -     Ambulatory Referral to General Surgery  -     Ambulatory Referral to Neurology    4. Right inguinal hernia  -     Ambulatory Referral to General Surgery  -     Ambulatory Referral to Neurology    Other orders  -     primidone (MYSOLINE) 50 MG tablet; Take 1 tablet by mouth Every Night.  Dispense: 90 tablet; Refill: 1        Follow Up   Return in about 4 weeks (around 12/13/2021).  Patient was given instructions and counseling regarding his condition or for health maintenance advice. Please see specific information pulled into the AVS if appropriate.

## 2021-12-03 NOTE — BRIEF OP NOTE
Brief Postoperative Note    Patient: Darrel Garcia  YOB: 1950  MRN: [id number]    Date of Procedure: 12/3/2021     Pre-Op Diagnosis: Microhematuria [R31.29]  Bladder mass [N32.89]    Post-Op Diagnosis: same plus urethral stricture    Procedure(s):  TRANSURETHRAL RESECTION OF BLADDER TUMOR WITH BIPOLAR/ MEAPLASTY/ MEATOTOMY    Surgeon(s):  Tayler Grullon MD    Surgical Assistant: None    Anesthesia: General     Estimated Blood Loss (mL): 541    Complications: none    Specimens:   ID Type Source Tests Collected by Time Destination   1 : Bladder Tumor Preservative Bladder  Tayler Grullon MD 12/3/2021 1121 Pathology        Implants: none    Drains: Wallace catheter    Findings: huge necrotic bladder tumor with typical transitional carcinoma appearance  Electronically Signed by Tiffanie Felix MD on 12/3/2021 at 11:50 AM

## 2021-12-11 NOTE — OP NOTES
[address] [facility]  OPERATIVE REPORT    Name:  Tino Woodward  MR#:  [id number]  :  1950  ACCOUNT #:  [de-identified]  DATE OF SERVICE:  2021    PREOPERATIVE DIAGNOSIS:  Bladder mass, hematuria. POSTOPERATIVE DIAGNOSIS:  Bladder mass, hematuria plus distal urethral stricture disease. PROCEDURE PERFORMED:  Cystoscopy, TUR large bladder tumor, internal urethrotomy, meatoplasty    SURGEON:  Harriet Thornton MD    ASSISTANT:  0    ANESTHESIA:  General.    COMPLICATIONS:  None. SPECIMENS REMOVED:  pathology    IMPLANTS:  0    ESTIMATED BLOOD LOSS:  50 ml    PROCEDURE:  With the patient in the lithotomy position, a sterile field was prepared. An attempt was made to insert a 22-Mohawk resectoscope sheath. The patient was found to have distal urethral stricture disease. An DaWobeek Stefani urethrotome was used to carry out an internal urethrotomy. The urethral mucosa was advanced over the area of incised scar tissue and approximated with interrupted stitches of 4-0 chromic suture. The resectoscope could then be inserted per urethra. Inspection of the interior bladder revealed a very large amount of necrotic tumor present. The patient had a large bladder mass, the extent of which was just almost impossible to evaluate due to the necrotic tumor and necrotic debris within the urinary bladder. Ureteral orifices could not be identified. The resectoscope was then used to remove as much of the necrotic debris as I could remove without creating a large amount of bleeding. The necrotic debris was still attached to the fronds of the tumor, and as I scraped the necrotic debris off the fronds of the tumor, bleeding occurred. One area of the tumor was freed of necrotic debris so that I could get a very good examination of the tissue. 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   Following this, extensive resection of the tumor was carried out. In addition to the necrotic debris, a large amount of tumor was resected. Again, care was taken to adequately control hemostasis. Once a satisfactory amount of tumor had been resected to confirm the diagnosis, good hemostasis was obtained and the bladder was drained and irrigated with a 22-Vietnamese Wallace catheter. The catheter was left in place and the patient was sent to the recovery room in good condition. Pending results of the pathology report, further disposition will be planned. Bimanual examination done while the patient was under anesthesia revealed the entire right side of the bladder being fixed to the lateral wall of the pelvis. The left bladder wall was more movable and free from the pelvic wall. The extension of the tumor came all the way to the bladder neck and proximal urethra.       Mark Arias MD      [id number]  D:  12/11/2021 8:43  T:  12/11/2021 15:25  JOB #:  [id number]

## 2021-12-30 PROBLEM — R31.9 HEMATURIA: Status: ACTIVE | Noted: 2021-01-01

## 2021-12-30 PROBLEM — C67.9 BLADDER CANCER (HCC): Status: ACTIVE | Noted: 2021-01-01

## 2021-12-30 NOTE — ANESTHESIA PREPROCEDURE EVALUATION
Relevant Problems   No relevant active problems       Anesthetic History   No history of anesthetic complications            Review of Systems / Medical History  Patient summary reviewed and pertinent labs reviewed    Pulmonary        Sleep apnea: No treatment  Smoker (Former heavy smoker)         Neuro/Psych              Cardiovascular                  Exercise tolerance: >4 METS  Comments: Denies CP or recent changes in functional status  TTE 2019 with EF 45-50%; Per pt, previously 20%     GI/Hepatic/Renal  Within defined limits              Endo/Other  Within defined limits           Other Findings   Comments: Bladder ca           Physical Exam    Airway  Mallampati: II  TM Distance: 4 - 6 cm  Neck ROM: normal range of motion   Mouth opening: Normal     Cardiovascular    Rhythm: regular  Rate: normal         Dental    Dentition: Poor dentition  Comments: VERY POOR DENTITION   Pulmonary      Decreased breath sounds: bilateral           Abdominal  GI exam deferred       Other Findings            Anesthetic Plan    ASA: 3  Anesthesia type: general          Induction: Intravenous  Anesthetic plan and risks discussed with: Patient and Spouse      Surgeon states he needs paralysis and GETA. Plan to treat with prophylactic albuterol neb.

## 2021-12-30 NOTE — PROGRESS NOTES
12/30/21 1424   Dual Skin Pressure Injury Assessment   Dual Skin Pressure Injury Assessment WDL   Second Care Provider (Based on [address] [place] [address]) [address] RN      Patient A&Ox4. No skin breakdown noted. Blanchable redness on sacrum coccyx. Bed low and locked. Call light within reach. Partner at bedside.

## 2021-12-30 NOTE — OP NOTES
[address] [facility]  OPERATIVE REPORT    Name:  David Loja  MR#:  [id number]  :  1950  ACCOUNT #:  [de-identified]  DATE OF SERVICE:  2021    PREOPERATIVE DIAGNOSIS:  Bladder tumor. POSTOPERATIVE DIAGNOSIS:  Large bladder tumor. PROCEDURE PERFORMED:  Transurethral resection of bladder tumor. SURGEON:  Chelo Koch MD    ASSISTANT:  None. ANESTHESIA:  GETA. COMPLICATIONS:  None. SPECIMENS REMOVED:  Bladder tumor. IMPLANTS:  None. ESTIMATED BLOOD LOSS:  Approximately 200 mL. INDICATIONS FOR PROCEDURE:  The patient is a pleasant 53-year-old female, with an impressive smoking history. She was recently found to have a large bladder tumor. She presents today for restaging TURBT. DETAILS OF THE PROCEDURE:  After informed consent was obtained and risks and benefits were again discussed, she was taken to operating room 4 at the [address] [place] [address]. After induction of general anesthesia and placement of an endotracheal tube, she was carefully placed in the low lithotomy position. Her genitalia were prepped and draped in normal sterile fashion. On bimanual examination, she had an easily palpable bladder mass involving the right portion of her bladder and felt to possibly involve the right vaginal cuff and wall. I then inserted a 26-Comoran continuous flow resectoscope and irrigated quite a bit of blood clots and debris from the bladder. She had some somewhat normal appearing areas on the left lateral wall of the bladder, but starting at about 12 o'clock position of the bladder neck going all the way around to the right side wall and right trigone, there was a massive amount of high-grade invasive appearing tumor. There was quite a bit of necrotic debris as well. I began resecting this systematically. I resected quite a bit of tumor that was all sent to Pathology for permanent. I resected an area greater than 8 cm.     I then concentrated on obtaining hemostasis for approximately the next 30-45 minutes. This was quite challenging because there were so much tumor remaining. Using the roller ball and electrocautery loop, I cauterized as much as possible. Of note, I was never able to identify either the right or left ureteral orifice. All tumor chips were removed from the bladder and sent to Pathology. I then placed a three-way 24-Indonesian Wallace catheter and monitored her irrigating her several times. I felt it most appropriate to place her on the overhead irrigation. There was no worry for perforation at the time of the resection. I then performed another bimanual exam, and she still had a palpable mass although it was much smaller. Her bladder does not appear mobile especially on the right side. It was primarily fixed to the vaginal cuff and I am uncertain about the sidewall, although it may not be fixed there. The patient was awakened, extubated, and taken to recovery room in stable condition. There were no known complications. DISPOSITION:  I will admit her for observation the night. She will be receiving overhead bladder irrigation and we will wean that as possible. I will have a discussion with her about the likely need for attempted radical cystectomy with ileal conduit urinary diversion. I do not think that this tumor can be managed endoscopically with TURBT, and she is certainly not a good candidate for radiation therapy. 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       MD DANNI [id number]  D:  12/30/2021 11:27  T:  12/30/2021 17:30  JOB #:  [id number]

## 2021-12-30 NOTE — BRIEF OP NOTE
Brief Postoperative Note    Patient: Ko Amarilys  YOB: 1950  MRN: [id number]    Date of Procedure: 12/30/2021     Pre-Op Diagnosis: Bladder tumor [D49.4]    Post-Op Diagnosis: Same as preoperative diagnosis. Procedure(s):  CYSTOSCOPY TRANSURETHRAL RESECTION OF BLADDER TUMOR    Surgeon(s):  Jaqueline Waldron MD    Surgical Assistant: None    Anesthesia: General     Estimated Blood Loss (mL): 834     Complications: None    Specimens:   ID Type Source Tests Collected by Time Destination   1 : Bladder Tumor Right Floor and Sidewall Preservative Bladder  Jaqueline Waldron MD 12/30/2021 1519 Pathology        Implants: * No implants in log *    Drains: * No LDAs found *    Findings: Impressive amount of tumor involving right trigone, sidewall and anterior bladder neck; not able to resect it all; clearly invasive. Bladder fixed to right vaginal wall ? Pelvic sidewall. No bladder perforation. Admit for obs and CBI secondary to hematuria.      Electronically Signed by Javier Nielsen MD on 12/30/2021 at 11:07 AM

## 2021-12-30 NOTE — ANESTHESIA POSTPROCEDURE EVALUATION
Procedure(s):  CYSTOSCOPY TRANSURETHRAL RESECTION OF BLADDER TUMOR.    general    Anesthesia Post Evaluation      Multimodal analgesia: multimodal analgesia used between 6 hours prior to anesthesia start to PACU discharge  Patient location during evaluation: bedside  Patient participation: complete - patient participated  Level of consciousness: awake and alert  Pain score: 1  Pain management: adequate  Airway patency: patent  Anesthetic complications: no  Cardiovascular status: acceptable  Respiratory status: acceptable  Hydration status: acceptable  Comments: Patient doing well. Continue care on floor. Post anesthesia nausea and vomiting:  none  Final Post Anesthesia Temperature Assessment:  Normothermia (36.0-37.5 degrees C)      INITIAL Post-op Vital signs:   Vitals Value Taken Time   BP 98/54 12/30/21 1145   Temp 37.2 °C (99 °F) 12/30/21 1124   Pulse 82 12/30/21 1147   Resp 16 12/30/21 1145   SpO2 93 % 12/30/21 1147   Vitals shown include unvalidated device data.

## 2021-12-30 NOTE — PROGRESS NOTES
Spiritual Care visit. Initial Visit, Pre Surgery Consult. Visit and prayer before patient goes to surgery.     Visit by Biju Hinton M.Ed., Th.B. ,Staff

## 2021-12-30 NOTE — PROGRESS NOTES
TRANSFER - IN REPORT:    Verbal report received from [facility] on Bernardine Ruffini  being received from [facility]) for routine progression of care      Report consisted of patients Situation, Background, Assessment and   Recommendations(SBAR). Information from the following report(s) SBAR, Kardex, OR Summary, MAR and Recent Results was reviewed with the receiving nurse. Opportunity for questions and clarification was provided. Assessment completed upon patients arrival to unit and care assumed.

## 2021-12-30 NOTE — PERIOP NOTES
TRANSFER - OUT REPORT:    Verbal report given to Terrence on Kokimberlyn Panda  being transferred to [id number] for in patient observation. Report consisted of patients Situation, Background, Assessment and   Recommendations(SBAR). Information from the following report(s) SBAR, OR Summary, Procedure Summary, Intake/Output, MAR and Recent Results was reviewed with the receiving nurse. Lines:   Peripheral IV 12/30/21 Right Wrist (Active)   Site Assessment Clean, dry, & intact 12/30/21 1350   Phlebitis Assessment 0 12/30/21 1350   Infiltration Assessment 0 12/30/21 1350   Dressing Status Clean, dry, & intact 12/30/21 1350   Dressing Type Transparent 12/30/21 1350   Hub Color/Line Status Patent; Infusing 12/30/21 1350        Opportunity for questions and clarification was provided. Patient transported with:  IV, SCDs, 3 way ventura, CBI, belongings bag. VTE prophylaxis orders has been written for Maikel Panda. Patient and family given floor number and nurses name. Family updated re: pt status after security code verified.

## 2021-12-31 NOTE — PROGRESS NOTES
Urology Progress Note    Admit Date: 12/30/2021    Subjective:     Patient has no new complaints. Urine clear off CBI; ventura still in place. Creat stable this am; hct 27. Objective:     Patient Vitals for the past 8 hrs:   BP Temp Pulse Resp SpO2   12/31/21 0710 (!) 95/52 97.8 °F (36.6 °C) (!) 57 16 95 %   12/31/21 0253 110/61 97.9 °F (36.6 °C) (!) 58 16 94 %     12/31 0701 - 12/31 1900  In: 3000   Out: 1250 [NZKWE:0179]  12/29 1901 - 12/31 0700  In: 20531 [I.V.:700]  Out: 37646 [Urine:88653]    Physical Exam:     Awake, alert, and oriented  Lungs no JVD. Breathing is  non-labored; no audible wheezing. Heart regular, normal perfusion  Abd soft, nt, nd  UOP clear      Data Review   Recent Results (from the past 24 hour(s))   METABOLIC PANEL, BASIC    Collection Time: 12/30/21 11:00 AM   Result Value Ref Range    Sodium 137 136 - 145 mmol/L    Potassium 4.8 3.5 - 5.1 mmol/L    Chloride 104 98 - 107 mmol/L    CO2 30 21 - 32 mmol/L    Anion gap 3 (L) 7 - 16 mmol/L    Glucose 115 (H) 65 - 100 mg/dL    BUN 7 (L) 8 - 23 MG/DL    Creatinine 0.71 0.6 - 1.0 MG/DL    GFR est AA >60 >60 ml/min/1.73m2    GFR est non-AA >60 >60 ml/min/1.73m2    Calcium 8.9 8.3 - 10.4 MG/DL   CBC WITH AUTOMATED DIFF    Collection Time: 12/30/21  3:25 PM   Result Value Ref Range    WBC 9.2 4.3 - 11.1 K/uL    RBC 3.74 (L) 4.05 - 5.2 M/uL    HGB 9.4 (L) 11.7 - 15.4 g/dL    HCT 31.1 (L) 35.8 - 46.3 %    MCV 83.2 79.6 - 97.8 FL    MCH 25.1 (L) 26.1 - 32.9 PG    MCHC 30.2 (L) 31.4 - 35.0 g/dL    RDW 16.1 (H) 11.9 - 14.6 %    PLATELET 751 467 - 679 K/uL    MPV 9.7 9.4 - 12.3 FL    ABSOLUTE NRBC 0.00 0.0 - 0.2 K/uL    DF AUTOMATED      NEUTROPHILS 93 (H) 43 - 78 %    LYMPHOCYTES 5 (L) 13 - 44 %    MONOCYTES 2 (L) 4.0 - 12.0 %    EOSINOPHILS 0 (L) 0.5 - 7.8 %    BASOPHILS 0 0.0 - 2.0 %    IMMATURE GRANULOCYTES 0 0.0 - 5.0 %    ABS. NEUTROPHILS 8.5 (H) 1.7 - 8.2 K/UL    ABS. LYMPHOCYTES 0.5 0.5 - 4.6 K/UL    ABS. MONOCYTES 0.1 0.1 - 1.3 K/UL    ABS. EOSINOPHILS 0.0 0.0 - 0.8 K/UL    ABS. BASOPHILS 0.0 0.0 - 0.2 K/UL    ABS. IMM. GRANS. 0.0 0.0 - 0.5 K/UL   METABOLIC PANEL, BASIC    Collection Time: 12/31/21  6:06 AM   Result Value Ref Range    Sodium 135 (L) 136 - 145 mmol/L    Potassium 4.0 3.5 - 5.1 mmol/L    Chloride 101 98 - 107 mmol/L    CO2 27 21 - 32 mmol/L    Anion gap 7 7 - 16 mmol/L    Glucose 169 (H) 65 - 100 mg/dL    BUN 12 8 - 23 MG/DL    Creatinine 0.95 0.6 - 1.0 MG/DL    GFR est AA >60 >60 ml/min/1.73m2    GFR est non-AA >60 >60 ml/min/1.73m2    Calcium 8.8 8.3 - 10.4 MG/DL   CBC W/O DIFF    Collection Time: 12/31/21  6:06 AM   Result Value Ref Range    WBC 13.2 (H) 4.3 - 11.1 K/uL    RBC 3.34 (L) 4.05 - 5.2 M/uL    HGB 8.4 (L) 11.7 - 15.4 g/dL    HCT 27.7 (L) 35.8 - 46.3 %    MCV 82.9 79.6 - 97.8 FL    MCH 25.1 (L) 26.1 - 32.9 PG    MCHC 30.3 (L) 31.4 - 35.0 g/dL    RDW 15.9 (H) 11.9 - 14.6 %    PLATELET 643 434 - 784 K/uL    MPV 9.5 9.4 - 12.3 FL    ABSOLUTE NRBC 0.00 0.0 - 0.2 K/uL           Assessment:     Active Problems:    Hematuria (12/30/2021)      Bladder cancer ([address] (12/30/2021)    s/p turbt with massive tumor in bladder. I discussed again with her this am about my rec for radical cystectomy with ileal conduit. This is how she wants to proceed. Will FU in my office of Monday for further discussion and surgical planning. She will need cardiac clearance prior.      Plan:     CARON ventura this am  DC home  FU Monday for RC with IC discussion; arrange cardiac clearance as well      Zion Bah MD    [facility]  [address]

## 2021-12-31 NOTE — PROGRESS NOTES
Patient to be discharged home today. Chart screened by  for discharge planning. No needs identified at this time. Please consult  if any new issues arise. Care Management Interventions  PCP Verified by CM:  Yes  Mode of Transport at Discharge: Self  Discharge Durable Medical Equipment: No  Physical Therapy Consult: No  Occupational Therapy Consult: No  Support Systems: Spouse/Significant Other  Confirm Follow Up Transport: Family  Discharge Location  Discharge Placement: Home

## 2021-12-31 NOTE — DISCHARGE INSTRUCTIONS
DISCHARGE SUMMARY from Nurse    PATIENT INSTRUCTIONS:    After general anesthesia or intravenous sedation, for 24 hours or while taking prescription Narcotics:  · Limit your activities  · Do not drive and operate hazardous machinery  · Do not make important personal or business decisions  · Do  not drink alcoholic beverages  · If you have not urinated within 8 hours after discharge, please contact your surgeon on call. Report the following to your surgeon:  · Excessive pain, swelling, redness or odor of or around the surgical area  · Temperature over 100.5  · Nausea and vomiting lasting longer than 4 hours or if unable to take medications  · Any signs of decreased circulation or nerve impairment to extremity: change in color, persistent  numbness, tingling, coldness or increase pain  · Any questions    What to do at Home:  Recommended activity: Activity as tolerated  *  Please give a list of your current medications to your Primary Care Provider. *  Please update this list whenever your medications are discontinued, doses are      changed, or new medications (including over-the-counter products) are added. *  Please carry medication information at all times in case of emergency situations. These are general instructions for a healthy lifestyle:    No smoking/ No tobacco products/ Avoid exposure to second hand smoke  Surgeon General's Warning:  Quitting smoking now greatly reduces serious risk to your health. Obesity, smoking, and sedentary lifestyle greatly increases your risk for illness    A healthy diet, regular physical exercise & weight monitoring are important for maintaining a healthy lifestyle    You may be retaining fluid if you have a history of heart failure or if you experience any of the following symptoms:  Weight gain of 3 pounds or more overnight or 5 pounds in a week, increased swelling in our hands or feet or shortness of breath while lying flat in bed.   Please call your doctor as soon as you notice any of these symptoms; do not wait until your next office visit. The discharge information has been reviewed with the patient. The patient verbalized understanding. Discharge medications reviewed with the patientPatient Education        Learning About Transurethral Resection of the Bladder  What is transurethral resection of the bladder? Transurethral resection of the bladder is a surgery that removes abnormal tissue (tumor) from the bladder through the urethra. It is also called transurethral resection of bladder tumor, or TURBT. A tumor in the bladder may be benign (not cancer) or malignant (cancer). This surgery uses a special tool to find and remove a tumor from the bladder. A small sample (biopsy) of the lining of the bladder may also be taken. Any removed tissue will be checked for cancer cells. This surgery may be done to look for cancer. It is also the most common and effective treatment for early-stage bladder cancer. It may also work well for more advanced cancer if all the cancer can be removed and biopsies show that no cancer cells remain. How is transurethral resection of the bladder done? Your doctor will give you medicine to make you sleep or feel relaxed. You will not feel pain. The doctor will put a thin, lighted tool called a cystoscope, or scope, into your urethra. The urethra is the tube that carries urine from the bladder to the outside of the body. Then the doctor will gently guide the scope into your bladder. Your bladder will then be filled with fluid. This stretches the bladder so that your doctor can clearly see the inside of your bladder. Your doctor will use small surgical tools through the scope to remove and/or burn away any abnormal tissue that is found. What can you expect after surgery? You may go home the same day as your surgery or stay in the hospital for an extra day or so.  Your doctor may leave a small tube called a catheter in the urethra to help prevent blockage of the urethra. It's often removed before you go home. If not, you'll get instructions on how to care for the catheter. You may feel the need to urinate often for a while after the surgery. But this should improve with time. It may burn when you urinate. Drink lots of fluids to help with the burning. Your urine also may look pink for up to 2 to 3 weeks after surgery. This is because there may be blood in it. You may have to avoid strenuous activity and heavy lifting for about 3 weeks after your surgery. If cancer is found in your bladder, your doctor will talk with you about what will happen next. Follow-up care is a key part of your treatment and safety. Be sure to make and go to all appointments, and call your doctor if you are having problems. It's also a good idea to know your test results and keep a list of the medicines you take. Where can you learn more? Go to http://gayle-fernanda.info/  Enter T508 in the search box to learn more about \"Learning About Transurethral Resection of the Bladder. \"  Current as of: December 17, 2020               Content Version: 13.0  © 2520-4803 [employer]. Care instructions adapted under license by [employer] (which disclaims liability or warranty for this information). If you have questions about a medical condition or this instruction, always ask your healthcare professional. [address] any warranty or liability for your use of this information. and appropriate educational materials and side effects teaching were provided.   ___________________________________________________________________________________________________________________________________

## 2021-12-31 NOTE — PROGRESS NOTES
Problem: Deep Venous Thrombosis - Risk of  Goal: *Absence of deep venous thrombosis signs and symptoms(Stroke Metric)  Outcome: Progressing Towards Goal  Goal: *Absence of impaired coagulation signs and symptoms  Outcome: Progressing Towards Goal  Goal: *Knowledge of prescribed medications  Outcome: Progressing Towards Goal  Goal: *Absence of bleeding  Outcome: Progressing Towards Goal     Problem: Falls - Risk of  Goal: *Absence of Falls  Description: Document Linda Fall Risk and appropriate interventions in the flowsheet.   Outcome: Progressing Towards Goal  Note: Fall Risk Interventions:                                Problem: Patient Education: Go to Patient Education Activity  Goal: Patient/Family Education  Outcome: Progressing Towards Goal

## 2022-01-01 ENCOUNTER — ANESTHESIA (OUTPATIENT)
Dept: SURGERY | Age: 72
DRG: 663 | End: 2022-01-01
Payer: COMMERCIAL

## 2022-01-01 ENCOUNTER — ANESTHESIA (OUTPATIENT)
Dept: INTERVENTIONAL RADIOLOGY/VASCULAR | Age: 72
End: 2022-01-01
Payer: COMMERCIAL

## 2022-01-01 ENCOUNTER — DOCUMENTATION ONLY (OUTPATIENT)
Dept: HEMATOLOGY | Age: 72
End: 2022-01-01

## 2022-01-01 ENCOUNTER — APPOINTMENT (OUTPATIENT)
Dept: GENERAL RADIOLOGY | Age: 72
DRG: 663 | End: 2022-01-01
Attending: ANESTHESIOLOGY
Payer: COMMERCIAL

## 2022-01-01 ENCOUNTER — APPOINTMENT (OUTPATIENT)
Dept: GENERAL RADIOLOGY | Age: 72
DRG: 091 | End: 2022-01-01
Attending: EMERGENCY MEDICINE
Payer: COMMERCIAL

## 2022-01-01 ENCOUNTER — ANESTHESIA EVENT (OUTPATIENT)
Dept: SURGERY | Age: 72
DRG: 663 | End: 2022-01-01
Payer: COMMERCIAL

## 2022-01-01 ENCOUNTER — ANESTHESIA EVENT (OUTPATIENT)
Dept: INTERVENTIONAL RADIOLOGY/VASCULAR | Age: 72
End: 2022-01-01
Payer: COMMERCIAL

## 2022-01-01 ENCOUNTER — HOSPITAL ENCOUNTER (OUTPATIENT)
Dept: INFUSION THERAPY | Age: 72
Discharge: HOME | End: 2022-04-05
Payer: COMMERCIAL

## 2022-01-01 ENCOUNTER — APPOINTMENT (OUTPATIENT)
Dept: GENERAL RADIOLOGY | Age: 72
DRG: 091 | End: 2022-01-01
Attending: NURSE PRACTITIONER
Payer: COMMERCIAL

## 2022-01-01 ENCOUNTER — HOSPITAL ENCOUNTER (INPATIENT)
Age: 72
LOS: 9 days | Discharge: HOME | DRG: 663 | End: 2022-03-04
Attending: EMERGENCY MEDICINE | Admitting: UROLOGY
Payer: COMMERCIAL

## 2022-01-01 ENCOUNTER — APPOINTMENT (OUTPATIENT)
Dept: CT IMAGING | Age: 72
DRG: 663 | End: 2022-01-01
Attending: EMERGENCY MEDICINE
Payer: COMMERCIAL

## 2022-01-01 ENCOUNTER — HOSPITAL ENCOUNTER (INPATIENT)
Age: 72
LOS: 6 days | DRG: 091 | End: 2022-04-19
Attending: EMERGENCY MEDICINE | Admitting: HOSPITALIST
Payer: COMMERCIAL

## 2022-01-01 ENCOUNTER — PATIENT OUTREACH (OUTPATIENT)
Dept: CASE MANAGEMENT | Age: 72
End: 2022-01-01

## 2022-01-01 ENCOUNTER — APPOINTMENT (OUTPATIENT)
Dept: MRI IMAGING | Age: 72
DRG: 091 | End: 2022-01-01
Attending: NURSE PRACTITIONER
Payer: COMMERCIAL

## 2022-01-01 ENCOUNTER — HOSPITAL ENCOUNTER (OUTPATIENT)
Dept: INTERVENTIONAL RADIOLOGY/VASCULAR | Age: 72
Discharge: HOME | End: 2022-03-08
Attending: NURSE PRACTITIONER
Payer: COMMERCIAL

## 2022-01-01 ENCOUNTER — HOSPITAL ENCOUNTER (OUTPATIENT)
Dept: SURGERY | Age: 72
Discharge: HOME | DRG: 663 | End: 2022-02-22
Payer: COMMERCIAL

## 2022-01-01 ENCOUNTER — HOSPITAL ENCOUNTER (OUTPATIENT)
Dept: LAB | Age: 72
Discharge: HOME | End: 2022-03-17
Payer: COMMERCIAL

## 2022-01-01 ENCOUNTER — HOSPICE ADMISSION (OUTPATIENT)
Dept: HOSPICE | Facility: HOSPICE | Age: 72
End: 2022-01-01

## 2022-01-01 ENCOUNTER — APPOINTMENT (OUTPATIENT)
Dept: INTERVENTIONAL RADIOLOGY/VASCULAR | Age: 72
DRG: 663 | End: 2022-01-01
Attending: INTERNAL MEDICINE
Payer: COMMERCIAL

## 2022-01-01 ENCOUNTER — HOSPITAL ENCOUNTER (OUTPATIENT)
Dept: LAB | Age: 72
Discharge: HOME | End: 2022-04-11
Payer: COMMERCIAL

## 2022-01-01 ENCOUNTER — HOSPITAL ENCOUNTER (OUTPATIENT)
Dept: INFUSION THERAPY | Age: 72
Discharge: HOME | End: 2022-04-04
Payer: COMMERCIAL

## 2022-01-01 ENCOUNTER — HOSPITAL ENCOUNTER (OUTPATIENT)
Dept: LAB | Age: 72
Discharge: HOME | End: 2022-04-04
Payer: COMMERCIAL

## 2022-01-01 ENCOUNTER — HOSPITAL ENCOUNTER (OUTPATIENT)
Dept: LAB | Age: 72
Discharge: HOME | End: 2022-02-02
Payer: COMMERCIAL

## 2022-01-01 ENCOUNTER — HOSPITAL ENCOUNTER (OUTPATIENT)
Dept: INFUSION THERAPY | Age: 72
Discharge: HOME | DRG: 091 | End: 2022-04-12
Payer: COMMERCIAL

## 2022-01-01 ENCOUNTER — APPOINTMENT (OUTPATIENT)
Dept: CT IMAGING | Age: 72
DRG: 091 | End: 2022-01-01
Attending: EMERGENCY MEDICINE
Payer: COMMERCIAL

## 2022-01-01 VITALS
OXYGEN SATURATION: 92 % | SYSTOLIC BLOOD PRESSURE: 141 MMHG | DIASTOLIC BLOOD PRESSURE: 84 MMHG | HEIGHT: 62 IN | TEMPERATURE: 97.7 F | WEIGHT: 128.4 LBS | BODY MASS INDEX: 23.63 KG/M2 | RESPIRATION RATE: 16 BRPM | HEART RATE: 84 BPM

## 2022-01-01 VITALS
WEIGHT: 139 LBS | OXYGEN SATURATION: 100 % | TEMPERATURE: 97.6 F | SYSTOLIC BLOOD PRESSURE: 127 MMHG | DIASTOLIC BLOOD PRESSURE: 69 MMHG | HEIGHT: 62 IN | HEART RATE: 93 BPM | BODY MASS INDEX: 25.58 KG/M2 | RESPIRATION RATE: 16 BRPM

## 2022-01-01 VITALS
DIASTOLIC BLOOD PRESSURE: 66 MMHG | WEIGHT: 124 LBS | HEART RATE: 72 BPM | BODY MASS INDEX: 22.5 KG/M2 | RESPIRATION RATE: 18 BRPM | SYSTOLIC BLOOD PRESSURE: 100 MMHG | TEMPERATURE: 97.3 F

## 2022-01-01 VITALS
TEMPERATURE: 99.1 F | HEART RATE: 106 BPM | WEIGHT: 130.47 LBS | DIASTOLIC BLOOD PRESSURE: 42 MMHG | RESPIRATION RATE: 26 BRPM | OXYGEN SATURATION: 96 % | HEIGHT: 62 IN | BODY MASS INDEX: 24.01 KG/M2 | SYSTOLIC BLOOD PRESSURE: 73 MMHG

## 2022-01-01 VITALS
RESPIRATION RATE: 17 BRPM | OXYGEN SATURATION: 98 % | SYSTOLIC BLOOD PRESSURE: 94 MMHG | BODY MASS INDEX: 25.6 KG/M2 | TEMPERATURE: 97.7 F | HEIGHT: 62 IN | HEART RATE: 97 BPM | DIASTOLIC BLOOD PRESSURE: 75 MMHG | WEIGHT: 139.1 LBS

## 2022-01-01 VITALS
OXYGEN SATURATION: 99 % | BODY MASS INDEX: 22.57 KG/M2 | TEMPERATURE: 97.2 F | DIASTOLIC BLOOD PRESSURE: 63 MMHG | SYSTOLIC BLOOD PRESSURE: 100 MMHG | RESPIRATION RATE: 16 BRPM | HEART RATE: 81 BPM | WEIGHT: 124.4 LBS

## 2022-01-01 DIAGNOSIS — D61.818 PANCYTOPENIA (HCC): ICD-10-CM

## 2022-01-01 DIAGNOSIS — C67.9 MALIGNANT NEOPLASM OF URINARY BLADDER, UNSPECIFIED SITE (HCC): ICD-10-CM

## 2022-01-01 DIAGNOSIS — Z01.810 PREOPERATIVE CARDIOVASCULAR EXAMINATION: ICD-10-CM

## 2022-01-01 DIAGNOSIS — N32.0 HYDRONEPHROSIS DUE TO OBSTRUCTION OF BLADDER: ICD-10-CM

## 2022-01-01 DIAGNOSIS — I50.22 SYSTOLIC CHF, CHRONIC (HCC): ICD-10-CM

## 2022-01-01 DIAGNOSIS — N39.0 URINARY TRACT INFECTION WITH HEMATURIA, SITE UNSPECIFIED: ICD-10-CM

## 2022-01-01 DIAGNOSIS — R65.10 SIRS (SYSTEMIC INFLAMMATORY RESPONSE SYNDROME) (HCC): ICD-10-CM

## 2022-01-01 DIAGNOSIS — G92.9 TOXIC ENCEPHALOPATHY: ICD-10-CM

## 2022-01-01 DIAGNOSIS — Z11.59 ENCOUNTER FOR SCREENING FOR OTHER VIRAL DISEASES: ICD-10-CM

## 2022-01-01 DIAGNOSIS — Z72.0 TOBACCO USE: ICD-10-CM

## 2022-01-01 DIAGNOSIS — G93.49 LEUKOENCEPHALOPATHY: ICD-10-CM

## 2022-01-01 DIAGNOSIS — N13.30 HYDRONEPHROSIS DUE TO OBSTRUCTION OF BLADDER: ICD-10-CM

## 2022-01-01 DIAGNOSIS — R31.9 URINARY TRACT INFECTION WITH HEMATURIA, SITE UNSPECIFIED: ICD-10-CM

## 2022-01-01 DIAGNOSIS — R41.82 ALTERED MENTAL STATUS, UNSPECIFIED ALTERED MENTAL STATUS TYPE: Primary | ICD-10-CM

## 2022-01-01 DIAGNOSIS — C67.8 MALIGNANT NEOPLASM OF OVERLAPPING SITES OF BLADDER (HCC): ICD-10-CM

## 2022-01-01 DIAGNOSIS — N17.9 ACUTE RENAL FAILURE, UNSPECIFIED ACUTE RENAL FAILURE TYPE (HCC): Primary | ICD-10-CM

## 2022-01-01 DIAGNOSIS — C67.9 MALIGNANT NEOPLASM OF URINARY BLADDER, UNSPECIFIED SITE (HCC): Primary | ICD-10-CM

## 2022-01-01 DIAGNOSIS — N17.9 AKI (ACUTE KIDNEY INJURY) (HCC): ICD-10-CM

## 2022-01-01 DIAGNOSIS — R79.89 ELEVATED SERUM CREATININE: Primary | ICD-10-CM

## 2022-01-01 LAB
ABO + RH BLD: NORMAL
ALBUMIN SERPL-MCNC: 1.7 G/DL (ref 3.2–4.6)
ALBUMIN SERPL-MCNC: 1.7 G/DL (ref 3.2–4.6)
ALBUMIN SERPL-MCNC: 1.9 G/DL (ref 3.2–4.6)
ALBUMIN SERPL-MCNC: 1.9 G/DL (ref 3.2–4.6)
ALBUMIN SERPL-MCNC: 2 G/DL (ref 3.2–4.6)
ALBUMIN SERPL-MCNC: 2.1 G/DL (ref 3.2–4.6)
ALBUMIN SERPL-MCNC: 2.2 G/DL (ref 3.2–4.6)
ALBUMIN SERPL-MCNC: 2.3 G/DL (ref 3.2–4.6)
ALBUMIN SERPL-MCNC: 2.7 G/DL (ref 3.2–4.6)
ALBUMIN/GLOB SERPL: 0.4 {RATIO} (ref 1.2–3.5)
ALBUMIN/GLOB SERPL: 0.5 {RATIO} (ref 1.2–3.5)
ALBUMIN/GLOB SERPL: 0.6 {RATIO} (ref 1.2–3.5)
ALP SERPL-CCNC: 46 U/L (ref 50–136)
ALP SERPL-CCNC: 49 U/L (ref 50–136)
ALP SERPL-CCNC: 49 U/L (ref 50–136)
ALP SERPL-CCNC: 51 U/L (ref 50–136)
ALP SERPL-CCNC: 53 U/L (ref 50–136)
ALP SERPL-CCNC: 54 U/L (ref 50–136)
ALP SERPL-CCNC: 65 U/L (ref 50–136)
ALP SERPL-CCNC: 68 U/L (ref 50–136)
ALP SERPL-CCNC: 83 U/L (ref 50–136)
ALT SERPL-CCNC: 12 U/L (ref 12–65)
ALT SERPL-CCNC: 14 U/L (ref 12–65)
ALT SERPL-CCNC: 15 U/L (ref 12–65)
ALT SERPL-CCNC: 17 U/L (ref 12–65)
ALT SERPL-CCNC: 21 U/L (ref 12–65)
ALT SERPL-CCNC: 7 U/L (ref 12–65)
ALT SERPL-CCNC: 8 U/L (ref 12–65)
AMORPH CRY URNS QL MICRO: ABNORMAL
ANION GAP SERPL CALC-SCNC: 10 MMOL/L (ref 7–16)
ANION GAP SERPL CALC-SCNC: 11 MMOL/L (ref 7–16)
ANION GAP SERPL CALC-SCNC: 12 MMOL/L (ref 7–16)
ANION GAP SERPL CALC-SCNC: 13 MMOL/L (ref 7–16)
ANION GAP SERPL CALC-SCNC: 13 MMOL/L (ref 7–16)
ANION GAP SERPL CALC-SCNC: 14 MMOL/L (ref 7–16)
ANION GAP SERPL CALC-SCNC: 14 MMOL/L (ref 7–16)
ANION GAP SERPL CALC-SCNC: 2 MMOL/L (ref 7–16)
ANION GAP SERPL CALC-SCNC: 2 MMOL/L (ref 7–16)
ANION GAP SERPL CALC-SCNC: 3 MMOL/L (ref 7–16)
ANION GAP SERPL CALC-SCNC: 5 MMOL/L (ref 7–16)
ANION GAP SERPL CALC-SCNC: 6 MMOL/L (ref 7–16)
ANION GAP SERPL CALC-SCNC: 6 MMOL/L (ref 7–16)
ANION GAP SERPL CALC-SCNC: 7 MMOL/L (ref 7–16)
ANION GAP SERPL CALC-SCNC: 7 MMOL/L (ref 7–16)
ANION GAP SERPL CALC-SCNC: 8 MMOL/L (ref 7–16)
ANION GAP SERPL CALC-SCNC: 9 MMOL/L (ref 7–16)
APPEARANCE UR: ABNORMAL
APPEARANCE UR: ABNORMAL
APTT PPP: 31.3 SEC (ref 24.1–35.1)
AST SERPL-CCNC: 10 U/L (ref 15–37)
AST SERPL-CCNC: 12 U/L (ref 15–37)
AST SERPL-CCNC: 17 U/L (ref 15–37)
AST SERPL-CCNC: 17 U/L (ref 15–37)
AST SERPL-CCNC: 26 U/L (ref 15–37)
AST SERPL-CCNC: 27 U/L (ref 15–37)
AST SERPL-CCNC: 27 U/L (ref 15–37)
AST SERPL-CCNC: 68 U/L (ref 15–37)
AST SERPL-CCNC: 7 U/L (ref 15–37)
ATRIAL RATE: 80 BPM
BACTERIA SPEC CULT: NORMAL
BACTERIA URNS QL MICRO: ABNORMAL /HPF
BACTERIA URNS QL MICRO: NORMAL /HPF
BASOPHILS # BLD: 0 K/UL (ref 0–0.2)
BASOPHILS # BLD: 0.1 K/UL (ref 0–0.2)
BASOPHILS NFR BLD: 0 % (ref 0–2)
BASOPHILS NFR BLD: 1 % (ref 0–2)
BASOPHILS NFR BLD: 2 % (ref 0–2)
BILIRUB SERPL-MCNC: 0.2 MG/DL (ref 0.2–1.1)
BILIRUB SERPL-MCNC: 0.3 MG/DL (ref 0.2–1.1)
BILIRUB SERPL-MCNC: 0.3 MG/DL (ref 0.2–1.1)
BILIRUB SERPL-MCNC: 0.4 MG/DL (ref 0.2–1.1)
BILIRUB SERPL-MCNC: 0.5 MG/DL (ref 0.2–1.1)
BILIRUB SERPL-MCNC: 0.5 MG/DL (ref 0.2–1.1)
BILIRUB SERPL-MCNC: 0.6 MG/DL (ref 0.2–1.1)
BILIRUB SERPL-MCNC: 0.6 MG/DL (ref 0.2–1.1)
BILIRUB SERPL-MCNC: 0.7 MG/DL (ref 0.2–1.1)
BILIRUB UR QL: ABNORMAL
BILIRUB UR QL: NEGATIVE
BLD PROD TYP BPU: NORMAL
BLOOD BANK CMNT PATIENT-IMP: NORMAL
BLOOD GROUP ANTIBODIES SERPL: NORMAL
BPU ID: NORMAL
BUN SERPL-MCNC: 18 MG/DL (ref 8–23)
BUN SERPL-MCNC: 21 MG/DL (ref 8–23)
BUN SERPL-MCNC: 24 MG/DL (ref 8–23)
BUN SERPL-MCNC: 28 MG/DL (ref 8–23)
BUN SERPL-MCNC: 30 MG/DL (ref 8–23)
BUN SERPL-MCNC: 30 MG/DL (ref 8–23)
BUN SERPL-MCNC: 37 MG/DL (ref 8–23)
BUN SERPL-MCNC: 38 MG/DL (ref 8–23)
BUN SERPL-MCNC: 40 MG/DL (ref 8–23)
BUN SERPL-MCNC: 42 MG/DL (ref 8–23)
BUN SERPL-MCNC: 44 MG/DL (ref 8–23)
BUN SERPL-MCNC: 47 MG/DL (ref 8–23)
BUN SERPL-MCNC: 47 MG/DL (ref 8–23)
BUN SERPL-MCNC: 49 MG/DL (ref 8–23)
BUN SERPL-MCNC: 57 MG/DL (ref 8–23)
BUN SERPL-MCNC: 60 MG/DL (ref 8–23)
BUN SERPL-MCNC: 64 MG/DL (ref 8–23)
BUN SERPL-MCNC: 66 MG/DL (ref 8–23)
BUN SERPL-MCNC: 67 MG/DL (ref 8–23)
BUN SERPL-MCNC: 74 MG/DL (ref 8–23)
CALCIUM SERPL-MCNC: 10.2 MG/DL (ref 8.3–10.4)
CALCIUM SERPL-MCNC: 10.4 MG/DL (ref 8.3–10.4)
CALCIUM SERPL-MCNC: 8.4 MG/DL (ref 8.3–10.4)
CALCIUM SERPL-MCNC: 8.5 MG/DL (ref 8.3–10.4)
CALCIUM SERPL-MCNC: 8.7 MG/DL (ref 8.3–10.4)
CALCIUM SERPL-MCNC: 8.7 MG/DL (ref 8.3–10.4)
CALCIUM SERPL-MCNC: 8.8 MG/DL (ref 8.3–10.4)
CALCIUM SERPL-MCNC: 8.9 MG/DL (ref 8.3–10.4)
CALCIUM SERPL-MCNC: 9.1 MG/DL (ref 8.3–10.4)
CALCIUM SERPL-MCNC: 9.1 MG/DL (ref 8.3–10.4)
CALCIUM SERPL-MCNC: 9.2 MG/DL (ref 8.3–10.4)
CALCULATED R AXIS, ECG10: 12 DEGREES
CALCULATED T AXIS, ECG11: 41 DEGREES
CASTS URNS QL MICRO: NORMAL /LPF
CHLORIDE SERPL-SCNC: 100 MMOL/L (ref 98–107)
CHLORIDE SERPL-SCNC: 103 MMOL/L (ref 98–107)
CHLORIDE SERPL-SCNC: 103 MMOL/L (ref 98–107)
CHLORIDE SERPL-SCNC: 104 MMOL/L (ref 98–107)
CHLORIDE SERPL-SCNC: 105 MMOL/L (ref 98–107)
CHLORIDE SERPL-SCNC: 106 MMOL/L (ref 98–107)
CHLORIDE SERPL-SCNC: 106 MMOL/L (ref 98–107)
CHLORIDE SERPL-SCNC: 107 MMOL/L (ref 98–107)
CHLORIDE SERPL-SCNC: 107 MMOL/L (ref 98–107)
CHLORIDE SERPL-SCNC: 108 MMOL/L (ref 98–107)
CHLORIDE SERPL-SCNC: 115 MMOL/L (ref 98–107)
CHLORIDE SERPL-SCNC: 117 MMOL/L (ref 98–107)
CHLORIDE SERPL-SCNC: 121 MMOL/L (ref 98–107)
CHLORIDE SERPL-SCNC: 122 MMOL/L (ref 98–107)
CHLORIDE SERPL-SCNC: 124 MMOL/L (ref 98–107)
CHLORIDE SERPL-SCNC: 125 MMOL/L (ref 98–107)
CHOLEST SERPL-MCNC: 152 MG/DL
CO2 SERPL-SCNC: 12 MMOL/L (ref 21–32)
CO2 SERPL-SCNC: 13 MMOL/L (ref 21–32)
CO2 SERPL-SCNC: 16 MMOL/L (ref 21–32)
CO2 SERPL-SCNC: 17 MMOL/L (ref 21–32)
CO2 SERPL-SCNC: 21 MMOL/L (ref 21–32)
CO2 SERPL-SCNC: 22 MMOL/L (ref 21–32)
CO2 SERPL-SCNC: 23 MMOL/L (ref 21–32)
CO2 SERPL-SCNC: 25 MMOL/L (ref 21–32)
CO2 SERPL-SCNC: 26 MMOL/L (ref 21–32)
CO2 SERPL-SCNC: 27 MMOL/L (ref 21–32)
CO2 SERPL-SCNC: 27 MMOL/L (ref 21–32)
CO2 SERPL-SCNC: 31 MMOL/L (ref 21–32)
CO2 SERPL-SCNC: 32 MMOL/L (ref 21–32)
CO2 SERPL-SCNC: 33 MMOL/L (ref 21–32)
CO2 SERPL-SCNC: 34 MMOL/L (ref 21–32)
CO2 SERPL-SCNC: 34 MMOL/L (ref 21–32)
COLOR UR: ABNORMAL
COLOR UR: YELLOW
COVID-19 RAPID TEST, COVR: NOT DETECTED
CREAT SERPL-MCNC: 1 MG/DL (ref 0.6–1)
CREAT SERPL-MCNC: 1.2 MG/DL (ref 0.6–1)
CREAT SERPL-MCNC: 1.2 MG/DL (ref 0.6–1)
CREAT SERPL-MCNC: 1.3 MG/DL (ref 0.6–1)
CREAT SERPL-MCNC: 1.5 MG/DL (ref 0.6–1)
CREAT SERPL-MCNC: 1.6 MG/DL (ref 0.6–1)
CREAT SERPL-MCNC: 1.6 MG/DL (ref 0.6–1)
CREAT SERPL-MCNC: 1.7 MG/DL (ref 0.6–1)
CREAT SERPL-MCNC: 2.1 MG/DL (ref 0.6–1)
CREAT SERPL-MCNC: 2.8 MG/DL (ref 0.6–1)
CREAT SERPL-MCNC: 4.4 MG/DL (ref 0.6–1)
CREAT SERPL-MCNC: 5.5 MG/DL (ref 0.6–1)
CREAT SERPL-MCNC: 6.2 MG/DL (ref 0.6–1)
CREAT SERPL-MCNC: 6.84 MG/DL (ref 0.6–1)
CREAT SERPL-MCNC: 7.3 MG/DL (ref 0.6–1)
CREAT SERPL-MCNC: 7.3 MG/DL (ref 0.6–1)
CROSSMATCH RESULT,%XM: NORMAL
CRYSTALS URNS QL MICRO: 0 /LPF
DIAGNOSIS, 93000: NORMAL
DIFFERENTIAL METHOD BLD: ABNORMAL
EOSINOPHIL # BLD: 0 K/UL (ref 0–0.8)
EOSINOPHIL # BLD: 0.1 K/UL (ref 0–0.8)
EOSINOPHIL # BLD: 0.2 K/UL (ref 0–0.8)
EOSINOPHIL # BLD: 0.2 K/UL (ref 0–0.8)
EOSINOPHIL NFR BLD: 0 % (ref 0.5–7.8)
EOSINOPHIL NFR BLD: 0 % (ref 0.5–7.8)
EOSINOPHIL NFR BLD: 1 % (ref 0.5–7.8)
EOSINOPHIL NFR BLD: 2 % (ref 0.5–7.8)
EOSINOPHIL NFR BLD: 2 % (ref 0.5–7.8)
EOSINOPHIL NFR BLD: 4 % (ref 0.5–7.8)
EPI CELLS #/AREA URNS HPF: 0 /HPF
EPI CELLS #/AREA URNS HPF: ABNORMAL /HPF
ERYTHROCYTE [DISTWIDTH] IN BLOOD BY AUTOMATED COUNT: 14.6 % (ref 11.9–14.6)
ERYTHROCYTE [DISTWIDTH] IN BLOOD BY AUTOMATED COUNT: 14.7 % (ref 11.9–14.6)
ERYTHROCYTE [DISTWIDTH] IN BLOOD BY AUTOMATED COUNT: 15.5 % (ref 11.9–14.6)
ERYTHROCYTE [DISTWIDTH] IN BLOOD BY AUTOMATED COUNT: 15.6 % (ref 11.9–14.6)
ERYTHROCYTE [DISTWIDTH] IN BLOOD BY AUTOMATED COUNT: 16.3 % (ref 11.9–14.6)
ERYTHROCYTE [DISTWIDTH] IN BLOOD BY AUTOMATED COUNT: 16.5 % (ref 11.9–14.6)
ERYTHROCYTE [DISTWIDTH] IN BLOOD BY AUTOMATED COUNT: 16.6 % (ref 11.9–14.6)
ERYTHROCYTE [DISTWIDTH] IN BLOOD BY AUTOMATED COUNT: 16.9 % (ref 11.9–14.6)
ERYTHROCYTE [DISTWIDTH] IN BLOOD BY AUTOMATED COUNT: 17.6 % (ref 11.9–14.6)
ERYTHROCYTE [DISTWIDTH] IN BLOOD BY AUTOMATED COUNT: 18.1 % (ref 11.9–14.6)
ERYTHROCYTE [DISTWIDTH] IN BLOOD BY AUTOMATED COUNT: 18.2 % (ref 11.9–14.6)
ERYTHROCYTE [DISTWIDTH] IN BLOOD BY AUTOMATED COUNT: 18.6 % (ref 11.9–14.6)
ERYTHROCYTE [DISTWIDTH] IN BLOOD BY AUTOMATED COUNT: 18.6 % (ref 11.9–14.6)
ERYTHROCYTE [DISTWIDTH] IN BLOOD BY AUTOMATED COUNT: 18.8 % (ref 11.9–14.6)
ERYTHROCYTE [DISTWIDTH] IN BLOOD BY AUTOMATED COUNT: 18.8 % (ref 11.9–14.6)
ERYTHROCYTE [DISTWIDTH] IN BLOOD BY AUTOMATED COUNT: 19.6 % (ref 11.9–14.6)
ERYTHROCYTE [DISTWIDTH] IN BLOOD BY AUTOMATED COUNT: 19.7 % (ref 11.9–14.6)
FERRITIN SERPL-MCNC: 419 NG/ML (ref 8–388)
FIBRINOGEN PPP-MCNC: 713 MG/DL (ref 190–501)
GLOBULIN SER CALC-MCNC: 3.6 G/DL (ref 2.3–3.5)
GLOBULIN SER CALC-MCNC: 3.8 G/DL (ref 2.3–3.5)
GLOBULIN SER CALC-MCNC: 3.9 G/DL (ref 2.3–3.5)
GLOBULIN SER CALC-MCNC: 4.1 G/DL (ref 2.3–3.5)
GLOBULIN SER CALC-MCNC: 4.2 G/DL (ref 2.3–3.5)
GLOBULIN SER CALC-MCNC: 4.2 G/DL (ref 2.3–3.5)
GLOBULIN SER CALC-MCNC: 4.4 G/DL (ref 2.3–3.5)
GLOBULIN SER CALC-MCNC: 4.4 G/DL (ref 2.3–3.5)
GLOBULIN SER CALC-MCNC: 4.6 G/DL (ref 2.3–3.5)
GLUCOSE BLD STRIP.AUTO-MCNC: 125 MG/DL (ref 65–100)
GLUCOSE SERPL-MCNC: 100 MG/DL (ref 65–100)
GLUCOSE SERPL-MCNC: 102 MG/DL (ref 65–100)
GLUCOSE SERPL-MCNC: 104 MG/DL (ref 65–100)
GLUCOSE SERPL-MCNC: 107 MG/DL (ref 65–100)
GLUCOSE SERPL-MCNC: 112 MG/DL (ref 65–100)
GLUCOSE SERPL-MCNC: 122 MG/DL (ref 65–100)
GLUCOSE SERPL-MCNC: 132 MG/DL (ref 65–100)
GLUCOSE SERPL-MCNC: 200 MG/DL (ref 65–100)
GLUCOSE SERPL-MCNC: 79 MG/DL (ref 65–100)
GLUCOSE SERPL-MCNC: 86 MG/DL (ref 65–100)
GLUCOSE SERPL-MCNC: 87 MG/DL (ref 65–100)
GLUCOSE SERPL-MCNC: 88 MG/DL (ref 65–100)
GLUCOSE SERPL-MCNC: 89 MG/DL (ref 65–100)
GLUCOSE SERPL-MCNC: 90 MG/DL (ref 65–100)
GLUCOSE SERPL-MCNC: 92 MG/DL (ref 65–100)
GLUCOSE SERPL-MCNC: 92 MG/DL (ref 65–100)
GLUCOSE SERPL-MCNC: 94 MG/DL (ref 65–100)
GLUCOSE SERPL-MCNC: 98 MG/DL (ref 65–100)
GLUCOSE UR STRIP.AUTO-MCNC: 100 MG/DL
GLUCOSE UR STRIP.AUTO-MCNC: NEGATIVE MG/DL
HBV CORE AB SERPL QL IA: NEGATIVE
HBV SURFACE AB SERPL IA-ACNC: 6.94 MIU/ML
HBV SURFACE AG SER QL: NONREACTIVE
HCT VFR BLD AUTO: 18.9 % (ref 35.8–46.3)
HCT VFR BLD AUTO: 19.1 % (ref 35.8–46.3)
HCT VFR BLD AUTO: 19.2 % (ref 35.8–46.3)
HCT VFR BLD AUTO: 22.8 % (ref 35.8–46.3)
HCT VFR BLD AUTO: 22.9 % (ref 35.8–46.3)
HCT VFR BLD AUTO: 23.8 % (ref 35.8–46.3)
HCT VFR BLD AUTO: 24.1 % (ref 35.8–46.3)
HCT VFR BLD AUTO: 25.4 % (ref 35.8–46.3)
HCT VFR BLD AUTO: 26.9 % (ref 35.8–46.3)
HCT VFR BLD AUTO: 26.9 % (ref 35.8–46.3)
HCT VFR BLD AUTO: 27 % (ref 35.8–46.3)
HCT VFR BLD AUTO: 27.3 % (ref 35.8–46.3)
HCT VFR BLD AUTO: 27.4 % (ref 35.8–46.3)
HCT VFR BLD AUTO: 27.6 % (ref 35.8–46.3)
HCT VFR BLD AUTO: 28 % (ref 35.8–46.3)
HCT VFR BLD AUTO: 29.5 % (ref 35.8–46.3)
HCT VFR BLD AUTO: 29.8 % (ref 35.8–46.3)
HCT VFR BLD AUTO: 30.6 % (ref 35.8–46.3)
HCT VFR BLD AUTO: 31.2 % (ref 35.8–46.3)
HCT VFR BLD AUTO: 31.6 % (ref 35.8–46.3)
HCV AB SER QL: NONREACTIVE
HDLC SERPL-MCNC: 33 MG/DL (ref 40–60)
HDLC SERPL: 4.6 {RATIO}
HGB BLD-MCNC: 5.8 G/DL (ref 11.7–15.4)
HGB BLD-MCNC: 6.1 G/DL (ref 11.7–15.4)
HGB BLD-MCNC: 6.3 G/DL (ref 11.7–15.4)
HGB BLD-MCNC: 7.1 G/DL (ref 11.7–15.4)
HGB BLD-MCNC: 7.6 G/DL (ref 11.7–15.4)
HGB BLD-MCNC: 7.6 G/DL (ref 11.7–15.4)
HGB BLD-MCNC: 7.7 G/DL (ref 11.7–15.4)
HGB BLD-MCNC: 7.9 G/DL (ref 11.7–15.4)
HGB BLD-MCNC: 8 G/DL (ref 11.7–15.4)
HGB BLD-MCNC: 8.1 G/DL (ref 11.7–15.4)
HGB BLD-MCNC: 8.1 G/DL (ref 11.7–15.4)
HGB BLD-MCNC: 8.2 G/DL (ref 11.7–15.4)
HGB BLD-MCNC: 8.3 G/DL (ref 11.7–15.4)
HGB BLD-MCNC: 8.5 G/DL (ref 11.7–15.4)
HGB BLD-MCNC: 8.6 G/DL (ref 11.7–15.4)
HGB BLD-MCNC: 9.2 G/DL (ref 11.7–15.4)
HGB BLD-MCNC: 9.4 G/DL (ref 11.7–15.4)
HGB BLD-MCNC: 9.4 G/DL (ref 11.7–15.4)
HGB BLD-MCNC: 9.5 G/DL (ref 11.7–15.4)
HGB BLD-MCNC: 9.8 G/DL (ref 11.7–15.4)
HGB UR QL STRIP: ABNORMAL
HGB UR QL STRIP: ABNORMAL
HISTORY CHECKED?,CKHIST: NORMAL
IMM GRANULOCYTES # BLD AUTO: 0 K/UL (ref 0–0.5)
IMM GRANULOCYTES # BLD AUTO: 0.1 K/UL (ref 0–0.5)
IMM GRANULOCYTES NFR BLD AUTO: 0 % (ref 0–5)
IMM GRANULOCYTES NFR BLD AUTO: 0 % (ref 0–5)
IMM GRANULOCYTES NFR BLD AUTO: 1 % (ref 0–5)
IMM GRANULOCYTES NFR BLD AUTO: 2 % (ref 0–5)
IMM GRANULOCYTES NFR BLD AUTO: 2 % (ref 0–5)
IMM GRANULOCYTES NFR BLD AUTO: 3 % (ref 0–5)
INR PPP: 1.1
IRON SATN MFR SERPL: 15 %
IRON SERPL-MCNC: 19 UG/DL (ref 35–150)
KETONES UR QL STRIP.AUTO: ABNORMAL MG/DL
KETONES UR QL STRIP.AUTO: NEGATIVE MG/DL
LACTATE SERPL-SCNC: 1.7 MMOL/L (ref 0.4–2)
LACTATE SERPL-SCNC: 2.1 MMOL/L (ref 0.4–2)
LDLC SERPL CALC-MCNC: 94 MG/DL
LEUKOCYTE ESTERASE UR QL STRIP.AUTO: ABNORMAL
LEUKOCYTE ESTERASE UR QL STRIP.AUTO: ABNORMAL
LIPASE SERPL-CCNC: 120 U/L (ref 73–393)
LYMPHOCYTES # BLD: 0.1 K/UL (ref 0.5–4.6)
LYMPHOCYTES # BLD: 0.2 K/UL (ref 0.5–4.6)
LYMPHOCYTES # BLD: 0.3 K/UL (ref 0.5–4.6)
LYMPHOCYTES # BLD: 0.3 K/UL (ref 0.5–4.6)
LYMPHOCYTES # BLD: 0.7 K/UL (ref 0.5–4.6)
LYMPHOCYTES # BLD: 0.8 K/UL (ref 0.5–4.6)
LYMPHOCYTES # BLD: 1 K/UL (ref 0.5–4.6)
LYMPHOCYTES # BLD: 1.2 K/UL (ref 0.5–4.6)
LYMPHOCYTES # BLD: 1.4 K/UL (ref 0.5–4.6)
LYMPHOCYTES # BLD: 1.4 K/UL (ref 0.5–4.6)
LYMPHOCYTES NFR BLD: 10 % (ref 13–44)
LYMPHOCYTES NFR BLD: 11 % (ref 13–44)
LYMPHOCYTES NFR BLD: 12 % (ref 13–44)
LYMPHOCYTES NFR BLD: 13 % (ref 13–44)
LYMPHOCYTES NFR BLD: 24 % (ref 13–44)
LYMPHOCYTES NFR BLD: 27 % (ref 13–44)
LYMPHOCYTES NFR BLD: 6 % (ref 13–44)
LYMPHOCYTES NFR BLD: 71 % (ref 13–44)
MAGNESIUM SERPL-MCNC: 1.7 MG/DL (ref 1.8–2.4)
MAGNESIUM SERPL-MCNC: 1.8 MG/DL (ref 1.8–2.4)
MAGNESIUM SERPL-MCNC: 1.8 MG/DL (ref 1.8–2.4)
MAGNESIUM SERPL-MCNC: 1.9 MG/DL (ref 1.8–2.4)
MAGNESIUM SERPL-MCNC: 2 MG/DL (ref 1.8–2.4)
MAGNESIUM SERPL-MCNC: 2.1 MG/DL (ref 1.8–2.4)
MAGNESIUM SERPL-MCNC: 2.3 MG/DL (ref 1.8–2.4)
MCH RBC QN AUTO: 23.6 PG (ref 26.1–32.9)
MCH RBC QN AUTO: 23.8 PG (ref 26.1–32.9)
MCH RBC QN AUTO: 23.9 PG (ref 26.1–32.9)
MCH RBC QN AUTO: 25.1 PG (ref 26.1–32.9)
MCH RBC QN AUTO: 25.2 PG (ref 26.1–32.9)
MCH RBC QN AUTO: 25.3 PG (ref 26.1–32.9)
MCH RBC QN AUTO: 25.6 PG (ref 26.1–32.9)
MCH RBC QN AUTO: 25.9 PG (ref 26.1–32.9)
MCH RBC QN AUTO: 26.2 PG (ref 26.1–32.9)
MCH RBC QN AUTO: 26.3 PG (ref 26.1–32.9)
MCH RBC QN AUTO: 26.4 PG (ref 26.1–32.9)
MCH RBC QN AUTO: 26.5 PG (ref 26.1–32.9)
MCH RBC QN AUTO: 26.6 PG (ref 26.1–32.9)
MCHC RBC AUTO-ENTMCNC: 28.9 G/DL (ref 31.4–35)
MCHC RBC AUTO-ENTMCNC: 29.6 G/DL (ref 31.4–35)
MCHC RBC AUTO-ENTMCNC: 30 G/DL (ref 31.4–35)
MCHC RBC AUTO-ENTMCNC: 30.4 G/DL (ref 31.4–35)
MCHC RBC AUTO-ENTMCNC: 30.5 G/DL (ref 31.4–35)
MCHC RBC AUTO-ENTMCNC: 30.7 G/DL (ref 31.4–35)
MCHC RBC AUTO-ENTMCNC: 30.7 G/DL (ref 31.4–35)
MCHC RBC AUTO-ENTMCNC: 31 G/DL (ref 31.4–35)
MCHC RBC AUTO-ENTMCNC: 31.1 G/DL (ref 31.4–35)
MCHC RBC AUTO-ENTMCNC: 31.5 G/DL (ref 31.4–35)
MCHC RBC AUTO-ENTMCNC: 31.6 G/DL (ref 31.4–35)
MCHC RBC AUTO-ENTMCNC: 31.9 G/DL (ref 31.4–35)
MCHC RBC AUTO-ENTMCNC: 32.4 G/DL (ref 31.4–35)
MCHC RBC AUTO-ENTMCNC: 32.8 G/DL (ref 31.4–35)
MCHC RBC AUTO-ENTMCNC: 33.2 G/DL (ref 31.4–35)
MCV RBC AUTO: 77.5 FL (ref 79.6–97.8)
MCV RBC AUTO: 78.2 FL (ref 79.6–97.8)
MCV RBC AUTO: 78.4 FL (ref 79.6–97.8)
MCV RBC AUTO: 79.5 FL (ref 79.6–97.8)
MCV RBC AUTO: 79.9 FL (ref 79.6–97.8)
MCV RBC AUTO: 81 FL (ref 79.6–97.8)
MCV RBC AUTO: 81.4 FL (ref 79.6–97.8)
MCV RBC AUTO: 81.5 FL (ref 79.6–97.8)
MCV RBC AUTO: 81.8 FL (ref 79.6–97.8)
MCV RBC AUTO: 82.3 FL (ref 79.6–97.8)
MCV RBC AUTO: 82.8 FL (ref 79.6–97.8)
MCV RBC AUTO: 82.8 FL (ref 79.6–97.8)
MCV RBC AUTO: 83.4 FL (ref 79.6–97.8)
MCV RBC AUTO: 84.1 FL (ref 79.6–97.8)
MCV RBC AUTO: 84.3 FL (ref 79.6–97.8)
MCV RBC AUTO: 84.8 FL (ref 79.6–97.8)
MCV RBC AUTO: 85.6 FL (ref 79.6–97.8)
MONOCYTES # BLD: 0 K/UL (ref 0.1–1.3)
MONOCYTES # BLD: 0.5 K/UL (ref 0.1–1.3)
MONOCYTES # BLD: 0.7 K/UL (ref 0.1–1.3)
MONOCYTES # BLD: 0.7 K/UL (ref 0.1–1.3)
MONOCYTES # BLD: 0.8 K/UL (ref 0.1–1.3)
MONOCYTES # BLD: 0.9 K/UL (ref 0.1–1.3)
MONOCYTES NFR BLD: 0 % (ref 4–12)
MONOCYTES NFR BLD: 1 % (ref 4–12)
MONOCYTES NFR BLD: 5 % (ref 4–12)
MONOCYTES NFR BLD: 6 % (ref 4–12)
MONOCYTES NFR BLD: 7 % (ref 4–12)
MUCOUS THREADS URNS QL MICRO: 0 /LPF
NEUTS SEG # BLD: 0.1 K/UL (ref 1.7–8.2)
NEUTS SEG # BLD: 0.7 K/UL (ref 1.7–8.2)
NEUTS SEG # BLD: 1.6 K/UL (ref 1.7–8.2)
NEUTS SEG # BLD: 12.1 K/UL (ref 1.7–8.2)
NEUTS SEG # BLD: 2.2 K/UL (ref 1.7–8.2)
NEUTS SEG # BLD: 2.2 K/UL (ref 1.7–8.2)
NEUTS SEG # BLD: 8.5 K/UL (ref 1.7–8.2)
NEUTS SEG # BLD: 8.8 K/UL (ref 1.7–8.2)
NEUTS SEG # BLD: 9.6 K/UL (ref 1.7–8.2)
NEUTS SEG # BLD: 9.9 K/UL (ref 1.7–8.2)
NEUTS SEG NFR BLD: 29 % (ref 43–78)
NEUTS SEG NFR BLD: 67 % (ref 43–78)
NEUTS SEG NFR BLD: 71 % (ref 43–78)
NEUTS SEG NFR BLD: 79 % (ref 43–78)
NEUTS SEG NFR BLD: 79 % (ref 43–78)
NEUTS SEG NFR BLD: 82 % (ref 43–78)
NEUTS SEG NFR BLD: 84 % (ref 43–78)
NEUTS SEG NFR BLD: 84 % (ref 43–78)
NEUTS SEG NFR BLD: 85 % (ref 43–78)
NEUTS SEG NFR BLD: 86 % (ref 43–78)
NITRITE UR QL STRIP.AUTO: NEGATIVE
NITRITE UR QL STRIP.AUTO: POSITIVE
NRBC # BLD: 0 K/UL (ref 0–0.2)
OTHER OBSERVATIONS,UCOM: ABNORMAL
PH UR STRIP: 6 [PH] (ref 5–9)
PH UR STRIP: 7 [PH] (ref 5–9)
PHOSPHATE SERPL-MCNC: 2.9 MG/DL (ref 2.3–3.7)
PHOSPHATE SERPL-MCNC: 4.2 MG/DL (ref 2.3–3.7)
PLATELET # BLD AUTO: 11 K/UL (ref 150–450)
PLATELET # BLD AUTO: 16 K/UL (ref 150–450)
PLATELET # BLD AUTO: 180 K/UL (ref 150–450)
PLATELET # BLD AUTO: 183 K/UL (ref 150–450)
PLATELET # BLD AUTO: 184 K/UL (ref 150–450)
PLATELET # BLD AUTO: 200 K/UL (ref 150–450)
PLATELET # BLD AUTO: 204 K/UL (ref 150–450)
PLATELET # BLD AUTO: 219 K/UL (ref 150–450)
PLATELET # BLD AUTO: 233 K/UL (ref 150–450)
PLATELET # BLD AUTO: 245 K/UL (ref 150–450)
PLATELET # BLD AUTO: 25 K/UL (ref 150–450)
PLATELET # BLD AUTO: 263 K/UL (ref 150–450)
PLATELET # BLD AUTO: 309 K/UL (ref 150–450)
PLATELET # BLD AUTO: 31 K/UL (ref 150–450)
PLATELET # BLD AUTO: 364 K/UL (ref 150–450)
PLATELET # BLD AUTO: 8 K/UL (ref 150–450)
PLATELET # BLD AUTO: 80 K/UL (ref 150–450)
PLATELET COMMENTS,PCOM: ABNORMAL
PMV BLD AUTO: 10 FL (ref 9.4–12.3)
PMV BLD AUTO: 10.9 FL (ref 9.4–12.3)
PMV BLD AUTO: 11.8 FL (ref 9.4–12.3)
PMV BLD AUTO: 8.9 FL (ref 9.4–12.3)
PMV BLD AUTO: 9.1 FL (ref 9.4–12.3)
PMV BLD AUTO: 9.1 FL (ref 9.4–12.3)
PMV BLD AUTO: 9.2 FL (ref 9.4–12.3)
PMV BLD AUTO: 9.3 FL (ref 9.4–12.3)
PMV BLD AUTO: 9.4 FL (ref 9.4–12.3)
PMV BLD AUTO: 9.5 FL (ref 9.4–12.3)
PMV BLD AUTO: 9.8 FL (ref 9.4–12.3)
POTASSIUM SERPL-SCNC: 2.6 MMOL/L (ref 3.5–5.1)
POTASSIUM SERPL-SCNC: 2.8 MMOL/L (ref 3.5–5.1)
POTASSIUM SERPL-SCNC: 2.9 MMOL/L (ref 3.5–5.1)
POTASSIUM SERPL-SCNC: 3 MMOL/L (ref 3.5–5.1)
POTASSIUM SERPL-SCNC: 3.1 MMOL/L (ref 3.5–5.1)
POTASSIUM SERPL-SCNC: 3.2 MMOL/L (ref 3.5–5.1)
POTASSIUM SERPL-SCNC: 3.4 MMOL/L (ref 3.5–5.1)
POTASSIUM SERPL-SCNC: 3.4 MMOL/L (ref 3.5–5.1)
POTASSIUM SERPL-SCNC: 3.5 MMOL/L (ref 3.5–5.1)
POTASSIUM SERPL-SCNC: 3.6 MMOL/L (ref 3.5–5.1)
POTASSIUM SERPL-SCNC: 3.7 MMOL/L (ref 3.5–5.1)
POTASSIUM SERPL-SCNC: 3.7 MMOL/L (ref 3.5–5.1)
POTASSIUM SERPL-SCNC: 3.8 MMOL/L (ref 3.5–5.1)
POTASSIUM SERPL-SCNC: 3.8 MMOL/L (ref 3.5–5.1)
POTASSIUM SERPL-SCNC: 3.9 MMOL/L (ref 3.5–5.1)
POTASSIUM SERPL-SCNC: 4 MMOL/L (ref 3.5–5.1)
POTASSIUM SERPL-SCNC: 4.8 MMOL/L (ref 3.5–5.1)
POTASSIUM SERPL-SCNC: 5 MMOL/L (ref 3.5–5.1)
PROCALCITONIN SERPL-MCNC: 0.3 NG/ML (ref 0–0.49)
PROT SERPL-MCNC: 5.3 G/DL (ref 6.3–8.2)
PROT SERPL-MCNC: 5.5 G/DL (ref 6.3–8.2)
PROT SERPL-MCNC: 5.8 G/DL (ref 6.3–8.2)
PROT SERPL-MCNC: 6 G/DL (ref 6.3–8.2)
PROT SERPL-MCNC: 6.2 G/DL (ref 6.3–8.2)
PROT SERPL-MCNC: 6.3 G/DL (ref 6.3–8.2)
PROT SERPL-MCNC: 6.6 G/DL (ref 6.3–8.2)
PROT SERPL-MCNC: 6.7 G/DL (ref 6.3–8.2)
PROT SERPL-MCNC: 7.3 G/DL (ref 6.3–8.2)
PROT UR STRIP-MCNC: 100 MG/DL
PROT UR STRIP-MCNC: ABNORMAL MG/DL
PROTHROMBIN TIME: 14.3 SEC (ref 12.6–14.5)
Q-T INTERVAL, ECG07: 444 MS
QRS DURATION, ECG06: 108 MS
QTC CALCULATION (BEZET), ECG08: 513 MS
RBC # BLD AUTO: 2.32 M/UL (ref 4.05–5.2)
RBC # BLD AUTO: 2.37 M/UL (ref 4.05–5.2)
RBC # BLD AUTO: 2.44 M/UL (ref 4.05–5.2)
RBC # BLD AUTO: 2.69 M/UL (ref 4.05–5.2)
RBC # BLD AUTO: 2.91 M/UL (ref 4.05–5.2)
RBC # BLD AUTO: 2.93 M/UL (ref 4.05–5.2)
RBC # BLD AUTO: 3.03 M/UL (ref 4.05–5.2)
RBC # BLD AUTO: 3.12 M/UL (ref 4.05–5.2)
RBC # BLD AUTO: 3.2 M/UL (ref 4.05–5.2)
RBC # BLD AUTO: 3.21 M/UL (ref 4.05–5.2)
RBC # BLD AUTO: 3.25 M/UL (ref 4.05–5.2)
RBC # BLD AUTO: 3.35 M/UL (ref 4.05–5.2)
RBC # BLD AUTO: 3.43 M/UL (ref 4.05–5.2)
RBC # BLD AUTO: 3.63 M/UL (ref 4.05–5.2)
RBC # BLD AUTO: 3.73 M/UL (ref 4.05–5.2)
RBC # BLD AUTO: 3.77 M/UL (ref 4.05–5.2)
RBC # BLD AUTO: 3.79 M/UL (ref 4.05–5.2)
RBC #/AREA URNS HPF: >100 /HPF
RBC #/AREA URNS HPF: NORMAL /HPF
RBC MORPH BLD: ABNORMAL
SERVICE CMNT-IMP: ABNORMAL
SERVICE CMNT-IMP: NORMAL
SODIUM SERPL-SCNC: 132 MMOL/L (ref 136–145)
SODIUM SERPL-SCNC: 133 MMOL/L (ref 136–145)
SODIUM SERPL-SCNC: 136 MMOL/L (ref 136–145)
SODIUM SERPL-SCNC: 136 MMOL/L (ref 136–145)
SODIUM SERPL-SCNC: 137 MMOL/L (ref 136–145)
SODIUM SERPL-SCNC: 137 MMOL/L (ref 136–145)
SODIUM SERPL-SCNC: 140 MMOL/L (ref 136–145)
SODIUM SERPL-SCNC: 141 MMOL/L (ref 136–145)
SODIUM SERPL-SCNC: 142 MMOL/L (ref 136–145)
SODIUM SERPL-SCNC: 142 MMOL/L (ref 136–145)
SODIUM SERPL-SCNC: 143 MMOL/L (ref 136–145)
SODIUM SERPL-SCNC: 147 MMOL/L (ref 136–145)
SODIUM SERPL-SCNC: 148 MMOL/L (ref 136–145)
SODIUM SERPL-SCNC: 149 MMOL/L (ref 136–145)
SODIUM SERPL-SCNC: 151 MMOL/L (ref 136–145)
SOURCE, COVRS: NORMAL
SP GR UR REFRACTOMETRY: 1.02 (ref 1–1.02)
SP GR UR REFRACTOMETRY: 1.02 (ref 1–1.02)
SPECIMEN EXP DATE BLD: NORMAL
STATUS OF UNIT,%ST: NORMAL
TIBC SERPL-MCNC: 129 UG/DL (ref 250–450)
TRIGL SERPL-MCNC: 125 MG/DL (ref 35–150)
TSH SERPL DL<=0.005 MIU/L-ACNC: 1.04 UIU/ML (ref 0.36–3.74)
UNIT DIVISION, %UDIV: 0
UROBILINOGEN UR QL STRIP.AUTO: 0.2 EU/DL (ref 0.2–1)
UROBILINOGEN UR QL STRIP.AUTO: 0.2 EU/DL (ref 0.2–1)
VENTRICULAR RATE, ECG03: 80 BPM
VLDLC SERPL CALC-MCNC: 25 MG/DL (ref 6–23)
WBC # BLD AUTO: 0.1 K/UL (ref 4.3–11.1)
WBC # BLD AUTO: 0.2 K/UL (ref 4.3–11.1)
WBC # BLD AUTO: 1 K/UL (ref 4.3–11.1)
WBC # BLD AUTO: 1.8 K/UL (ref 4.3–11.1)
WBC # BLD AUTO: 10.1 K/UL (ref 4.3–11.1)
WBC # BLD AUTO: 10.9 K/UL (ref 4.3–11.1)
WBC # BLD AUTO: 11 K/UL (ref 4.3–11.1)
WBC # BLD AUTO: 12.1 K/UL (ref 4.3–11.1)
WBC # BLD AUTO: 12.1 K/UL (ref 4.3–11.1)
WBC # BLD AUTO: 12.6 K/UL (ref 4.3–11.1)
WBC # BLD AUTO: 14.1 K/UL (ref 4.3–11.1)
WBC # BLD AUTO: 2.6 K/UL (ref 4.3–11.1)
WBC # BLD AUTO: 3 K/UL (ref 4.3–11.1)
WBC # BLD AUTO: 7.5 K/UL (ref 4.3–11.1)
WBC # BLD AUTO: 8.1 K/UL (ref 4.3–11.1)
WBC # BLD AUTO: 9.3 K/UL (ref 4.3–11.1)
WBC # BLD AUTO: 9.4 K/UL (ref 4.3–11.1)
WBC MORPH BLD: ABNORMAL
WBC URNS QL MICRO: >100 /HPF
WBC URNS QL MICRO: NORMAL /HPF

## 2022-01-01 PROCEDURE — 77030003029 HC SUT VCRL J&J -B: Performed by: UROLOGY

## 2022-01-01 PROCEDURE — 36415 COLL VENOUS BLD VENIPUNCTURE: CPT

## 2022-01-01 PROCEDURE — 85027 COMPLETE CBC AUTOMATED: CPT

## 2022-01-01 PROCEDURE — 70551 MRI BRAIN STEM W/O DYE: CPT

## 2022-01-01 PROCEDURE — 74011250637 HC RX REV CODE- 250/637: Performed by: UROLOGY

## 2022-01-01 PROCEDURE — 74011000250 HC RX REV CODE- 250: Performed by: NURSE ANESTHETIST, CERTIFIED REGISTERED

## 2022-01-01 PROCEDURE — 80048 BASIC METABOLIC PNL TOTAL CA: CPT

## 2022-01-01 PROCEDURE — C9113 INJ PANTOPRAZOLE SODIUM, VIA: HCPCS | Performed by: PSYCHIATRY & NEUROLOGY

## 2022-01-01 PROCEDURE — 83735 ASSAY OF MAGNESIUM: CPT

## 2022-01-01 PROCEDURE — 99233 SBSQ HOSP IP/OBS HIGH 50: CPT | Performed by: INTERNAL MEDICINE

## 2022-01-01 PROCEDURE — 77030002986 HC SUT PROL J&J -A

## 2022-01-01 PROCEDURE — 88307 TISSUE EXAM BY PATHOLOGIST: CPT

## 2022-01-01 PROCEDURE — 65270000029 HC RM PRIVATE

## 2022-01-01 PROCEDURE — P9016 RBC LEUKOCYTES REDUCED: HCPCS

## 2022-01-01 PROCEDURE — 74011000250 HC RX REV CODE- 250: Performed by: UROLOGY

## 2022-01-01 PROCEDURE — 84443 ASSAY THYROID STIM HORMONE: CPT

## 2022-01-01 PROCEDURE — 74011250637 HC RX REV CODE- 250/637: Performed by: FAMILY MEDICINE

## 2022-01-01 PROCEDURE — 36430 TRANSFUSION BLD/BLD COMPNT: CPT

## 2022-01-01 PROCEDURE — 84100 ASSAY OF PHOSPHORUS: CPT

## 2022-01-01 PROCEDURE — 86706 HEP B SURFACE ANTIBODY: CPT

## 2022-01-01 PROCEDURE — 74011000250 HC RX REV CODE- 250: Performed by: INTERNAL MEDICINE

## 2022-01-01 PROCEDURE — 0DUW07Z SUPPLEMENT PERITONEUM WITH AUTOLOGOUS TISSUE SUBSTITUTE, OPEN APPROACH: ICD-10-PCS | Performed by: UROLOGY

## 2022-01-01 PROCEDURE — 74011250636 HC RX REV CODE- 250/636: Performed by: NURSE PRACTITIONER

## 2022-01-01 PROCEDURE — 77030027138 HC INCENT SPIROMETER -A

## 2022-01-01 PROCEDURE — 97530 THERAPEUTIC ACTIVITIES: CPT

## 2022-01-01 PROCEDURE — 82565 ASSAY OF CREATININE: CPT

## 2022-01-01 PROCEDURE — 2709999900 HC NON-CHARGEABLE SUPPLY

## 2022-01-01 PROCEDURE — 30233N1 TRANSFUSION OF NONAUTOLOGOUS RED BLOOD CELLS INTO PERIPHERAL VEIN, PERCUTANEOUS APPROACH: ICD-10-PCS | Performed by: UROLOGY

## 2022-01-01 PROCEDURE — 80053 COMPREHEN METABOLIC PANEL: CPT

## 2022-01-01 PROCEDURE — 74011250636 HC RX REV CODE- 250/636: Performed by: EMERGENCY MEDICINE

## 2022-01-01 PROCEDURE — 74011000250 HC RX REV CODE- 250: Performed by: EMERGENCY MEDICINE

## 2022-01-01 PROCEDURE — APPSS60 APP SPLIT SHARED TIME 46-60 MINUTES: Performed by: NURSE PRACTITIONER

## 2022-01-01 PROCEDURE — 74011000250 HC RX REV CODE- 250: Performed by: FAMILY MEDICINE

## 2022-01-01 PROCEDURE — 71045 X-RAY EXAM CHEST 1 VIEW: CPT

## 2022-01-01 PROCEDURE — 81015 MICROSCOPIC EXAM OF URINE: CPT

## 2022-01-01 PROCEDURE — 99232 SBSQ HOSP IP/OBS MODERATE 35: CPT | Performed by: PSYCHIATRY & NEUROLOGY

## 2022-01-01 PROCEDURE — P9037 PLATE PHERES LEUKOREDU IRRAD: HCPCS

## 2022-01-01 PROCEDURE — 74011250636 HC RX REV CODE- 250/636

## 2022-01-01 PROCEDURE — 0T9030Z DRAINAGE OF RIGHT KIDNEY WITH DRAINAGE DEVICE, PERCUTANEOUS APPROACH: ICD-10-PCS | Performed by: RADIOLOGY

## 2022-01-01 PROCEDURE — C1769 GUIDE WIRE: HCPCS

## 2022-01-01 PROCEDURE — 97166 OT EVAL MOD COMPLEX 45 MIN: CPT

## 2022-01-01 PROCEDURE — 76010000171 HC OR TIME 2 TO 2.5 HR INTENSV-TIER 1: Performed by: UROLOGY

## 2022-01-01 PROCEDURE — 74011250637 HC RX REV CODE- 250/637: Performed by: INTERNAL MEDICINE

## 2022-01-01 PROCEDURE — 74011250636 HC RX REV CODE- 250/636: Performed by: ANESTHESIOLOGY

## 2022-01-01 PROCEDURE — 87086 URINE CULTURE/COLONY COUNT: CPT

## 2022-01-01 PROCEDURE — 51798 US URINE CAPACITY MEASURE: CPT

## 2022-01-01 PROCEDURE — 99285 EMERGENCY DEPT VISIT HI MDM: CPT

## 2022-01-01 PROCEDURE — 0TBB0ZX EXCISION OF BLADDER, OPEN APPROACH, DIAGNOSTIC: ICD-10-PCS | Performed by: UROLOGY

## 2022-01-01 PROCEDURE — 96361 HYDRATE IV INFUSION ADD-ON: CPT

## 2022-01-01 PROCEDURE — 77030031131 HC SUT MXN P COVD -B

## 2022-01-01 PROCEDURE — 74011000250 HC RX REV CODE- 250

## 2022-01-01 PROCEDURE — 85025 COMPLETE CBC W/AUTO DIFF WBC: CPT

## 2022-01-01 PROCEDURE — 74011250636 HC RX REV CODE- 250/636: Performed by: HOSPITALIST

## 2022-01-01 PROCEDURE — 77030010541: Performed by: UROLOGY

## 2022-01-01 PROCEDURE — 87040 BLOOD CULTURE FOR BACTERIA: CPT

## 2022-01-01 PROCEDURE — 97535 SELF CARE MNGMENT TRAINING: CPT

## 2022-01-01 PROCEDURE — 76210000006 HC OR PH I REC 0.5 TO 1 HR: Performed by: UROLOGY

## 2022-01-01 PROCEDURE — 74011250636 HC RX REV CODE- 250/636: Performed by: INTERNAL MEDICINE

## 2022-01-01 PROCEDURE — 85018 HEMOGLOBIN: CPT

## 2022-01-01 PROCEDURE — 74011250637 HC RX REV CODE- 250/637: Performed by: NURSE PRACTITIONER

## 2022-01-01 PROCEDURE — 81003 URINALYSIS AUTO W/O SCOPE: CPT

## 2022-01-01 PROCEDURE — 74011000250 HC RX REV CODE- 250: Performed by: HOSPITALIST

## 2022-01-01 PROCEDURE — 80061 LIPID PANEL: CPT

## 2022-01-01 PROCEDURE — 74011250636 HC RX REV CODE- 250/636: Performed by: FAMILY MEDICINE

## 2022-01-01 PROCEDURE — 74011250636 HC RX REV CODE- 250/636: Performed by: UROLOGY

## 2022-01-01 PROCEDURE — 74011000258 HC RX REV CODE- 258: Performed by: FAMILY MEDICINE

## 2022-01-01 PROCEDURE — APPSS30 APP SPLIT SHARED TIME 16-30 MINUTES: Performed by: NURSE PRACTITIONER

## 2022-01-01 PROCEDURE — 77030037088 HC TUBE ENDOTRACH ORAL NSL COVD-A: Performed by: ANESTHESIOLOGY

## 2022-01-01 PROCEDURE — 77030020407 HC IV BLD WRMR ST 3M -A: Performed by: ANESTHESIOLOGY

## 2022-01-01 PROCEDURE — 96360 HYDRATION IV INFUSION INIT: CPT

## 2022-01-01 PROCEDURE — 96365 THER/PROPH/DIAG IV INF INIT: CPT

## 2022-01-01 PROCEDURE — 86900 BLOOD TYPING SEROLOGIC ABO: CPT

## 2022-01-01 PROCEDURE — 74011250637 HC RX REV CODE- 250/637: Performed by: ANESTHESIOLOGY

## 2022-01-01 PROCEDURE — 74011000258 HC RX REV CODE- 258: Performed by: INTERNAL MEDICINE

## 2022-01-01 PROCEDURE — 74011250636 HC RX REV CODE- 250/636: Performed by: PSYCHIATRY & NEUROLOGY

## 2022-01-01 PROCEDURE — 77030011278 HC ELECTRD LIG IMPT COVD -F: Performed by: UROLOGY

## 2022-01-01 PROCEDURE — 74011000636 HC RX REV CODE- 636: Performed by: RADIOLOGY

## 2022-01-01 PROCEDURE — C1729 CATH, DRAINAGE: HCPCS

## 2022-01-01 PROCEDURE — 74011000258 HC RX REV CODE- 258: Performed by: PSYCHIATRY & NEUROLOGY

## 2022-01-01 PROCEDURE — 30233N1 TRANSFUSION OF NONAUTOLOGOUS RED BLOOD CELLS INTO PERIPHERAL VEIN, PERCUTANEOUS APPROACH: ICD-10-PCS | Performed by: INTERNAL MEDICINE

## 2022-01-01 PROCEDURE — C9113 INJ PANTOPRAZOLE SODIUM, VIA: HCPCS | Performed by: INTERNAL MEDICINE

## 2022-01-01 PROCEDURE — 77030020751 HC FLTR TBNG TRNSFUS HAEM -A: Performed by: ANESTHESIOLOGY

## 2022-01-01 PROCEDURE — 74011000258 HC RX REV CODE- 258: Performed by: NURSE ANESTHETIST, CERTIFIED REGISTERED

## 2022-01-01 PROCEDURE — 97161 PT EVAL LOW COMPLEX 20 MIN: CPT

## 2022-01-01 PROCEDURE — 74011000250 HC RX REV CODE- 250: Performed by: ANESTHESIOLOGY

## 2022-01-01 PROCEDURE — APPSS45 APP SPLIT SHARED TIME 31-45 MINUTES: Performed by: NURSE PRACTITIONER

## 2022-01-01 PROCEDURE — 87635 SARS-COV-2 COVID-19 AMP PRB: CPT

## 2022-01-01 PROCEDURE — 77030009979 HC RELD STPLR TCR J&J -C: Performed by: UROLOGY

## 2022-01-01 PROCEDURE — 36430 TRANSFUSION BLD/BLD COMPNT: CPT | Performed by: HOSPITALIST

## 2022-01-01 PROCEDURE — 84145 PROCALCITONIN (PCT): CPT

## 2022-01-01 PROCEDURE — 77030002966 HC SUT PDS J&J -A: Performed by: UROLOGY

## 2022-01-01 PROCEDURE — 99024 POSTOP FOLLOW-UP VISIT: CPT | Performed by: NURSE PRACTITIONER

## 2022-01-01 PROCEDURE — 77030039425 HC BLD LARYNG TRULITE DISP TELE -A: Performed by: ANESTHESIOLOGY

## 2022-01-01 PROCEDURE — 88309 TISSUE EXAM BY PATHOLOGIST: CPT

## 2022-01-01 PROCEDURE — 0TNB0ZZ RELEASE BLADDER, OPEN APPROACH: ICD-10-PCS | Performed by: UROLOGY

## 2022-01-01 PROCEDURE — 87340 HEPATITIS B SURFACE AG IA: CPT

## 2022-01-01 PROCEDURE — 93005 ELECTROCARDIOGRAM TRACING: CPT | Performed by: EMERGENCY MEDICINE

## 2022-01-01 PROCEDURE — 77030040831 HC BAG URINE DRNG MDII -A: Performed by: UROLOGY

## 2022-01-01 PROCEDURE — 99223 1ST HOSP IP/OBS HIGH 75: CPT | Performed by: UROLOGY

## 2022-01-01 PROCEDURE — 77030010512 HC APPL CLP LIG J&J -C: Performed by: UROLOGY

## 2022-01-01 PROCEDURE — APPSS180 APP SPLIT SHARED TIME > 60 MINUTES: Performed by: NURSE PRACTITIONER

## 2022-01-01 PROCEDURE — 74011000250 HC RX REV CODE- 250: Performed by: RADIOLOGY

## 2022-01-01 PROCEDURE — P9035 PLATELET PHERES LEUKOREDUCED: HCPCS

## 2022-01-01 PROCEDURE — 77030019908 HC STETH ESOPH SIMS -A: Performed by: ANESTHESIOLOGY

## 2022-01-01 PROCEDURE — 74011000258 HC RX REV CODE- 258: Performed by: EMERGENCY MEDICINE

## 2022-01-01 PROCEDURE — 77030010507 HC ADH SKN DERMBND J&J -B

## 2022-01-01 PROCEDURE — 74011000250 HC RX REV CODE- 250: Performed by: PSYCHIATRY & NEUROLOGY

## 2022-01-01 PROCEDURE — 82962 GLUCOSE BLOOD TEST: CPT

## 2022-01-01 PROCEDURE — 77030002916 HC SUT ETHLN J&J -A

## 2022-01-01 PROCEDURE — 96375 TX/PRO/DX INJ NEW DRUG ADDON: CPT

## 2022-01-01 PROCEDURE — 77030040506 HC DRN WND MDII -A: Performed by: UROLOGY

## 2022-01-01 PROCEDURE — C1894 INTRO/SHEATH, NON-LASER: HCPCS

## 2022-01-01 PROCEDURE — 77030002996 HC SUT SLK J&J -A: Performed by: UROLOGY

## 2022-01-01 PROCEDURE — 74011250636 HC RX REV CODE- 250/636: Performed by: PHYSICIAN ASSISTANT

## 2022-01-01 PROCEDURE — 74011000250 HC RX REV CODE- 250: Performed by: NURSE PRACTITIONER

## 2022-01-01 PROCEDURE — 77030031139 HC SUT VCRL2 J&J -A

## 2022-01-01 PROCEDURE — 36561 INSERT TUNNELED CV CATH: CPT

## 2022-01-01 PROCEDURE — 99223 1ST HOSP IP/OBS HIGH 75: CPT | Performed by: PSYCHIATRY & NEUROLOGY

## 2022-01-01 PROCEDURE — 74011000258 HC RX REV CODE- 258: Performed by: NURSE PRACTITIONER

## 2022-01-01 PROCEDURE — 82728 ASSAY OF FERRITIN: CPT

## 2022-01-01 PROCEDURE — 85610 PROTHROMBIN TIME: CPT

## 2022-01-01 PROCEDURE — 96366 THER/PROPH/DIAG IV INF ADDON: CPT

## 2022-01-01 PROCEDURE — 99223 1ST HOSP IP/OBS HIGH 75: CPT | Performed by: INTERNAL MEDICINE

## 2022-01-01 PROCEDURE — 96417 CHEMO IV INFUS EACH ADDL SEQ: CPT

## 2022-01-01 PROCEDURE — 77030005401 HC CATH RAD ARRO -A: Performed by: ANESTHESIOLOGY

## 2022-01-01 PROCEDURE — 2709999900 HC NON-CHARGEABLE SUPPLY: Performed by: UROLOGY

## 2022-01-01 PROCEDURE — 77030002916 HC SUT ETHLN J&J -A: Performed by: UROLOGY

## 2022-01-01 PROCEDURE — 96367 TX/PROPH/DG ADDL SEQ IV INF: CPT

## 2022-01-01 PROCEDURE — 77030011808 HC STPLR ENDOSCOPIC J&J -D: Performed by: UROLOGY

## 2022-01-01 PROCEDURE — 74011000250 HC RX REV CODE- 250: Performed by: PHYSICIAN ASSISTANT

## 2022-01-01 PROCEDURE — 96413 CHEMO IV INFUSION 1 HR: CPT

## 2022-01-01 PROCEDURE — 74011250636 HC RX REV CODE- 250/636: Performed by: NURSE ANESTHETIST, CERTIFIED REGISTERED

## 2022-01-01 PROCEDURE — 85384 FIBRINOGEN ACTIVITY: CPT

## 2022-01-01 PROCEDURE — C1788 PORT, INDWELLING, IMP: HCPCS

## 2022-01-01 PROCEDURE — 74011250636 HC RX REV CODE- 250/636: Performed by: RADIOLOGY

## 2022-01-01 PROCEDURE — 86923 COMPATIBILITY TEST ELECTRIC: CPT

## 2022-01-01 PROCEDURE — 77030003602 HC NDL NRV BLK BBMI -B: Performed by: ANESTHESIOLOGY

## 2022-01-01 PROCEDURE — 77030010294 HC STPLR INT TI J&J -C: Performed by: UROLOGY

## 2022-01-01 PROCEDURE — 83540 ASSAY OF IRON: CPT

## 2022-01-01 PROCEDURE — 49000 EXPLORATION OF ABDOMEN: CPT | Performed by: UROLOGY

## 2022-01-01 PROCEDURE — 86803 HEPATITIS C AB TEST: CPT

## 2022-01-01 PROCEDURE — 76060000032 HC ANESTHESIA 0.5 TO 1 HR

## 2022-01-01 PROCEDURE — 74176 CT ABD & PELVIS W/O CONTRAST: CPT

## 2022-01-01 PROCEDURE — 76060000035 HC ANESTHESIA 2 TO 2.5 HR: Performed by: UROLOGY

## 2022-01-01 PROCEDURE — 77030013292 HC BOWL MX PRSM J&J -A: Performed by: ANESTHESIOLOGY

## 2022-01-01 PROCEDURE — 83690 ASSAY OF LIPASE: CPT

## 2022-01-01 PROCEDURE — 85730 THROMBOPLASTIN TIME PARTIAL: CPT

## 2022-01-01 PROCEDURE — 0DNU0ZZ RELEASE OMENTUM, OPEN APPROACH: ICD-10-PCS | Performed by: UROLOGY

## 2022-01-01 PROCEDURE — 99233 SBSQ HOSP IP/OBS HIGH 50: CPT | Performed by: PSYCHIATRY & NEUROLOGY

## 2022-01-01 PROCEDURE — 99152 MOD SED SAME PHYS/QHP 5/>YRS: CPT

## 2022-01-01 PROCEDURE — 86644 CMV ANTIBODY: CPT

## 2022-01-01 PROCEDURE — 77030002933 HC SUT MCRYL J&J -A: Performed by: UROLOGY

## 2022-01-01 PROCEDURE — 74011250637 HC RX REV CODE- 250/637: Performed by: HOSPITALIST

## 2022-01-01 PROCEDURE — 99232 SBSQ HOSP IP/OBS MODERATE 35: CPT | Performed by: INTERNAL MEDICINE

## 2022-01-01 PROCEDURE — 77030036731 HC STPLR ENDOSC J&J -F: Performed by: UROLOGY

## 2022-01-01 PROCEDURE — 77030010293 HC STPLR INT TI J&J -B: Performed by: UROLOGY

## 2022-01-01 PROCEDURE — 77030009980 HC RELD STPLR TR J&J -B: Performed by: UROLOGY

## 2022-01-01 PROCEDURE — 77030031139 HC SUT VCRL2 J&J -A: Performed by: UROLOGY

## 2022-01-01 PROCEDURE — 70553 MRI BRAIN STEM W/O & W/DYE: CPT

## 2022-01-01 PROCEDURE — 99232 SBSQ HOSP IP/OBS MODERATE 35: CPT | Performed by: NURSE PRACTITIONER

## 2022-01-01 PROCEDURE — 70450 CT HEAD/BRAIN W/O DYE: CPT

## 2022-01-01 PROCEDURE — C1751 CATH, INF, PER/CENT/MIDLINE: HCPCS | Performed by: ANESTHESIOLOGY

## 2022-01-01 PROCEDURE — 83605 ASSAY OF LACTIC ACID: CPT

## 2022-01-01 PROCEDURE — A9576 INJ PROHANCE MULTIPACK: HCPCS | Performed by: INTERNAL MEDICINE

## 2022-01-01 PROCEDURE — 77030013794 HC KT TRNSDUC BLD EDWD -B: Performed by: ANESTHESIOLOGY

## 2022-01-01 PROCEDURE — 86704 HEP B CORE ANTIBODY TOTAL: CPT

## 2022-01-01 PROCEDURE — 77030025702 HC BG URIN DRN MRTM -A

## 2022-01-01 PROCEDURE — 74011000258 HC RX REV CODE- 258: Performed by: UROLOGY

## 2022-01-01 PROCEDURE — 77030040830 HC CATH URETH FOL MDII -A: Performed by: UROLOGY

## 2022-01-01 RX ORDER — SODIUM CHLORIDE 0.9 % (FLUSH) 0.9 %
10 SYRINGE (ML) INJECTION
Status: ACTIVE | OUTPATIENT
Start: 2022-01-01 | End: 2022-01-01

## 2022-01-01 RX ORDER — POTASSIUM CHLORIDE 7.45 MG/ML
40 INJECTION INTRAVENOUS ONCE
Status: DISCONTINUED | OUTPATIENT
Start: 2022-01-01 | End: 2022-01-01 | Stop reason: SDUPTHER

## 2022-01-01 RX ORDER — HEPARIN 100 UNIT/ML
300-500 SYRINGE INTRAVENOUS AS NEEDED
Status: CANCELLED
Start: 2022-01-01

## 2022-01-01 RX ORDER — SODIUM CHLORIDE, SODIUM LACTATE, POTASSIUM CHLORIDE, CALCIUM CHLORIDE 600; 310; 30; 20 MG/100ML; MG/100ML; MG/100ML; MG/100ML
150 INJECTION, SOLUTION INTRAVENOUS CONTINUOUS
Status: DISCONTINUED | OUTPATIENT
Start: 2022-01-01 | End: 2022-01-01

## 2022-01-01 RX ORDER — HYDROCORTISONE SODIUM SUCCINATE 100 MG/2ML
100 INJECTION, POWDER, FOR SOLUTION INTRAMUSCULAR; INTRAVENOUS AS NEEDED
Status: ACTIVE | OUTPATIENT
Start: 2022-01-01 | End: 2022-01-01

## 2022-01-01 RX ORDER — HYDROMORPHONE HYDROCHLORIDE 1 MG/ML
0.5 INJECTION, SOLUTION INTRAMUSCULAR; INTRAVENOUS; SUBCUTANEOUS
Status: COMPLETED | OUTPATIENT
Start: 2022-01-01 | End: 2022-01-01

## 2022-01-01 RX ORDER — HYDROCODONE BITARTRATE AND ACETAMINOPHEN 5; 325 MG/1; MG/1
1 TABLET ORAL
Qty: 25 TABLET | Refills: 0 | Status: SHIPPED | OUTPATIENT
Start: 2022-01-01 | End: 2022-01-01

## 2022-01-01 RX ORDER — SODIUM CHLORIDE 9 MG/ML
10 INJECTION INTRAMUSCULAR; INTRAVENOUS; SUBCUTANEOUS AS NEEDED
Status: DISCONTINUED | OUTPATIENT
Start: 2022-01-01 | End: 2022-01-01 | Stop reason: HOSPADM

## 2022-01-01 RX ORDER — MAGNESIUM SULFATE HEPTAHYDRATE 40 MG/ML
2 INJECTION, SOLUTION INTRAVENOUS ONCE
Status: COMPLETED | OUTPATIENT
Start: 2022-01-01 | End: 2022-01-01

## 2022-01-01 RX ORDER — ACETAMINOPHEN 325 MG/1
650 TABLET ORAL
Status: CANCELLED | OUTPATIENT
Start: 2022-01-01 | End: 2022-01-01

## 2022-01-01 RX ORDER — MAGNESIUM SULFATE 1 G/100ML
1 INJECTION INTRAVENOUS ONCE
Status: COMPLETED | OUTPATIENT
Start: 2022-01-01 | End: 2022-01-01

## 2022-01-01 RX ORDER — CHLOROPROCAINE HYDROCHLORIDE 20 MG/ML
2-20 INJECTION, SOLUTION EPIDURAL; INFILTRATION; INTRACAUDAL; PERINEURAL ONCE
Status: DISCONTINUED | OUTPATIENT
Start: 2022-01-01 | End: 2022-01-01

## 2022-01-01 RX ORDER — GLYCOPYRROLATE 0.2 MG/ML
INJECTION INTRAMUSCULAR; INTRAVENOUS AS NEEDED
Status: DISCONTINUED | OUTPATIENT
Start: 2022-01-01 | End: 2022-01-01 | Stop reason: HOSPADM

## 2022-01-01 RX ORDER — SODIUM CHLORIDE 9 MG/ML
25 INJECTION, SOLUTION INTRAVENOUS CONTINUOUS
Status: DISCONTINUED | OUTPATIENT
Start: 2022-01-01 | End: 2022-01-01

## 2022-01-01 RX ORDER — ALBUTEROL SULFATE 0.83 MG/ML
2.5 SOLUTION RESPIRATORY (INHALATION) AS NEEDED
Status: ACTIVE | OUTPATIENT
Start: 2022-01-01 | End: 2022-01-01

## 2022-01-01 RX ORDER — ACETAMINOPHEN 325 MG/1
650 TABLET ORAL
Status: DISCONTINUED | OUTPATIENT
Start: 2022-01-01 | End: 2022-01-01

## 2022-01-01 RX ORDER — SODIUM CHLORIDE, SODIUM LACTATE, POTASSIUM CHLORIDE, CALCIUM CHLORIDE 600; 310; 30; 20 MG/100ML; MG/100ML; MG/100ML; MG/100ML
INJECTION, SOLUTION INTRAVENOUS
Status: DISCONTINUED | OUTPATIENT
Start: 2022-01-01 | End: 2022-01-01 | Stop reason: HOSPADM

## 2022-01-01 RX ORDER — POTASSIUM CHLORIDE 20 MEQ/1
20 TABLET, EXTENDED RELEASE ORAL 2 TIMES DAILY
Status: DISPENSED | OUTPATIENT
Start: 2022-01-01 | End: 2022-01-01

## 2022-01-01 RX ORDER — SODIUM CHLORIDE 9 MG/ML
100 INJECTION, SOLUTION INTRAVENOUS CONTINUOUS
Status: DISCONTINUED | OUTPATIENT
Start: 2022-01-01 | End: 2022-01-01 | Stop reason: HOSPADM

## 2022-01-01 RX ORDER — HYDROMORPHONE HYDROCHLORIDE 2 MG/ML
0.5 INJECTION, SOLUTION INTRAMUSCULAR; INTRAVENOUS; SUBCUTANEOUS
Status: DISCONTINUED | OUTPATIENT
Start: 2022-01-01 | End: 2022-01-01 | Stop reason: HOSPADM

## 2022-01-01 RX ORDER — SODIUM CHLORIDE 0.9 % (FLUSH) 0.9 %
10 SYRINGE (ML) INJECTION AS NEEDED
Status: DISCONTINUED | OUTPATIENT
Start: 2022-01-01 | End: 2022-01-01 | Stop reason: HOSPADM

## 2022-01-01 RX ORDER — HEPARIN SODIUM 5000 [USP'U]/ML
5000 INJECTION, SOLUTION INTRAVENOUS; SUBCUTANEOUS EVERY 8 HOURS
Status: DISCONTINUED | OUTPATIENT
Start: 2022-01-01 | End: 2022-01-01 | Stop reason: HOSPADM

## 2022-01-01 RX ORDER — LIDOCAINE HYDROCHLORIDE 20 MG/ML
2-20 INJECTION, SOLUTION INFILTRATION; PERINEURAL ONCE
Status: COMPLETED | OUTPATIENT
Start: 2022-01-01 | End: 2022-01-01

## 2022-01-01 RX ORDER — ONDANSETRON 2 MG/ML
8 INJECTION INTRAMUSCULAR; INTRAVENOUS ONCE
Status: COMPLETED | OUTPATIENT
Start: 2022-01-01 | End: 2022-01-01

## 2022-01-01 RX ORDER — POLYETHYLENE GLYCOL 3350 17 G/17G
17 POWDER, FOR SOLUTION ORAL DAILY PRN
Status: DISCONTINUED | OUTPATIENT
Start: 2022-01-01 | End: 2022-01-01

## 2022-01-01 RX ORDER — HEPARIN SODIUM (PORCINE) LOCK FLUSH IV SOLN 100 UNIT/ML 100 UNIT/ML
500 SOLUTION INTRAVENOUS ONCE
Status: COMPLETED | OUTPATIENT
Start: 2022-01-01 | End: 2022-01-01

## 2022-01-01 RX ORDER — HYDROMORPHONE HYDROCHLORIDE 1 MG/ML
1 INJECTION, SOLUTION INTRAMUSCULAR; INTRAVENOUS; SUBCUTANEOUS
Status: DISCONTINUED | OUTPATIENT
Start: 2022-01-01 | End: 2022-01-01 | Stop reason: HOSPADM

## 2022-01-01 RX ORDER — LIDOCAINE HYDROCHLORIDE 20 MG/ML
INJECTION, SOLUTION EPIDURAL; INFILTRATION; INTRACAUDAL; PERINEURAL AS NEEDED
Status: DISCONTINUED | OUTPATIENT
Start: 2022-01-01 | End: 2022-01-01 | Stop reason: HOSPADM

## 2022-01-01 RX ORDER — ACETAMINOPHEN 650 MG/1
650 SUPPOSITORY RECTAL
Status: DISCONTINUED | OUTPATIENT
Start: 2022-01-01 | End: 2022-01-01

## 2022-01-01 RX ORDER — LANOLIN ALCOHOL/MO/W.PET/CERES
400 CREAM (GRAM) TOPICAL 2 TIMES DAILY
Status: DISCONTINUED | OUTPATIENT
Start: 2022-01-01 | End: 2022-01-01 | Stop reason: HOSPADM

## 2022-01-01 RX ORDER — MIDAZOLAM HYDROCHLORIDE 1 MG/ML
2 INJECTION, SOLUTION INTRAMUSCULAR; INTRAVENOUS ONCE
Status: DISCONTINUED | OUTPATIENT
Start: 2022-01-01 | End: 2022-01-01 | Stop reason: HOSPADM

## 2022-01-01 RX ORDER — DEXTROSE MONOHYDRATE 50 MG/ML
100 INJECTION, SOLUTION INTRAVENOUS CONTINUOUS
Status: DISCONTINUED | OUTPATIENT
Start: 2022-01-01 | End: 2022-01-01

## 2022-01-01 RX ORDER — SODIUM CHLORIDE 0.9 % (FLUSH) 0.9 %
5-40 SYRINGE (ML) INJECTION EVERY 8 HOURS
Status: DISCONTINUED | OUTPATIENT
Start: 2022-01-01 | End: 2022-01-01 | Stop reason: HOSPADM

## 2022-01-01 RX ORDER — ONDANSETRON 2 MG/ML
4 INJECTION INTRAMUSCULAR; INTRAVENOUS
Status: COMPLETED | OUTPATIENT
Start: 2022-01-01 | End: 2022-01-01

## 2022-01-01 RX ORDER — DEXAMETHASONE SODIUM PHOSPHATE 4 MG/ML
INJECTION, SOLUTION INTRA-ARTICULAR; INTRALESIONAL; INTRAMUSCULAR; INTRAVENOUS; SOFT TISSUE
Status: COMPLETED | OUTPATIENT
Start: 2022-01-01 | End: 2022-01-01

## 2022-01-01 RX ORDER — MIDAZOLAM HYDROCHLORIDE 1 MG/ML
.5-2 INJECTION, SOLUTION INTRAMUSCULAR; INTRAVENOUS
Status: DISCONTINUED | OUTPATIENT
Start: 2022-01-01 | End: 2022-01-01

## 2022-01-01 RX ORDER — SODIUM CHLORIDE 9 MG/ML
250 INJECTION, SOLUTION INTRAVENOUS AS NEEDED
Status: DISCONTINUED | OUTPATIENT
Start: 2022-01-01 | End: 2022-01-01

## 2022-01-01 RX ORDER — SODIUM CHLORIDE 0.9 % (FLUSH) 0.9 %
5-40 SYRINGE (ML) INJECTION EVERY 8 HOURS
Status: DISCONTINUED | OUTPATIENT
Start: 2022-01-01 | End: 2022-01-01

## 2022-01-01 RX ORDER — HYDROMORPHONE HYDROCHLORIDE 1 MG/ML
0.2 INJECTION, SOLUTION INTRAMUSCULAR; INTRAVENOUS; SUBCUTANEOUS
Status: DISCONTINUED | OUTPATIENT
Start: 2022-01-01 | End: 2022-01-01 | Stop reason: HOSPADM

## 2022-01-01 RX ORDER — BUPIVACAINE HYDROCHLORIDE 2.5 MG/ML
INJECTION, SOLUTION EPIDURAL; INFILTRATION; INTRACAUDAL
Status: COMPLETED | OUTPATIENT
Start: 2022-01-01 | End: 2022-01-01

## 2022-01-01 RX ORDER — SODIUM CHLORIDE 0.9 % (FLUSH) 0.9 %
5-40 SYRINGE (ML) INJECTION AS NEEDED
Status: DISCONTINUED | OUTPATIENT
Start: 2022-01-01 | End: 2022-01-01 | Stop reason: HOSPADM

## 2022-01-01 RX ORDER — PROPOFOL 10 MG/ML
INJECTION, EMULSION INTRAVENOUS AS NEEDED
Status: DISCONTINUED | OUTPATIENT
Start: 2022-01-01 | End: 2022-01-01 | Stop reason: HOSPADM

## 2022-01-01 RX ORDER — OXYCODONE HYDROCHLORIDE 5 MG/1
5 TABLET ORAL
Status: DISCONTINUED | OUTPATIENT
Start: 2022-01-01 | End: 2022-01-01 | Stop reason: HOSPADM

## 2022-01-01 RX ORDER — HYDROCODONE BITARTRATE AND ACETAMINOPHEN 5; 325 MG/1; MG/1
1 TABLET ORAL
Status: DISCONTINUED | OUTPATIENT
Start: 2022-01-01 | End: 2022-01-01 | Stop reason: SDUPTHER

## 2022-01-01 RX ORDER — POTASSIUM CHLORIDE 20 MEQ/1
40 TABLET, EXTENDED RELEASE ORAL 2 TIMES DAILY
Status: COMPLETED | OUTPATIENT
Start: 2022-01-01 | End: 2022-01-01

## 2022-01-01 RX ORDER — ONDANSETRON 4 MG/1
4 TABLET, ORALLY DISINTEGRATING ORAL
Status: DISCONTINUED | OUTPATIENT
Start: 2022-01-01 | End: 2022-01-01 | Stop reason: SDUPTHER

## 2022-01-01 RX ORDER — LIDOCAINE HYDROCHLORIDE ANHYDROUS AND DEXTROSE MONOHYDRATE .8; 5 G/100ML; G/100ML
INJECTION, SOLUTION INTRAVENOUS
Status: DISCONTINUED | OUTPATIENT
Start: 2022-01-01 | End: 2022-01-01 | Stop reason: HOSPADM

## 2022-01-01 RX ORDER — HYDROMORPHONE HYDROCHLORIDE 1 MG/ML
0.4 INJECTION, SOLUTION INTRAMUSCULAR; INTRAVENOUS; SUBCUTANEOUS
Status: DISCONTINUED | OUTPATIENT
Start: 2022-01-01 | End: 2022-01-01 | Stop reason: HOSPADM

## 2022-01-01 RX ORDER — SODIUM CHLORIDE 0.9 % (FLUSH) 0.9 %
5-40 SYRINGE (ML) INJECTION AS NEEDED
Status: DISCONTINUED | OUTPATIENT
Start: 2022-01-01 | End: 2022-01-01

## 2022-01-01 RX ORDER — SODIUM CHLORIDE, SODIUM LACTATE, POTASSIUM CHLORIDE, CALCIUM CHLORIDE 600; 310; 30; 20 MG/100ML; MG/100ML; MG/100ML; MG/100ML
50 INJECTION, SOLUTION INTRAVENOUS CONTINUOUS
Status: DISCONTINUED | OUTPATIENT
Start: 2022-01-01 | End: 2022-01-01 | Stop reason: HOSPADM

## 2022-01-01 RX ORDER — ACETAMINOPHEN 325 MG/1
650 TABLET ORAL AS NEEDED
Status: ACTIVE | OUTPATIENT
Start: 2022-01-01 | End: 2022-01-01

## 2022-01-01 RX ORDER — EPINEPHRINE 1 MG/ML
0.3 INJECTION, SOLUTION, CONCENTRATE INTRAVENOUS AS NEEDED
Status: ACTIVE | OUTPATIENT
Start: 2022-01-01 | End: 2022-01-01

## 2022-01-01 RX ORDER — ONDANSETRON 2 MG/ML
4 INJECTION INTRAMUSCULAR; INTRAVENOUS
Status: DISCONTINUED | OUTPATIENT
Start: 2022-01-01 | End: 2022-01-01 | Stop reason: HOSPADM

## 2022-01-01 RX ORDER — SODIUM CHLORIDE, SODIUM LACTATE, POTASSIUM CHLORIDE, CALCIUM CHLORIDE 600; 310; 30; 20 MG/100ML; MG/100ML; MG/100ML; MG/100ML
75 INJECTION, SOLUTION INTRAVENOUS CONTINUOUS
Status: DISCONTINUED | OUTPATIENT
Start: 2022-01-01 | End: 2022-01-01

## 2022-01-01 RX ORDER — SODIUM CHLORIDE 0.9 % (FLUSH) 0.9 %
5-10 SYRINGE (ML) INJECTION AS NEEDED
Status: DISCONTINUED | OUTPATIENT
Start: 2022-01-01 | End: 2022-01-01

## 2022-01-01 RX ORDER — MIDAZOLAM HYDROCHLORIDE 1 MG/ML
2 INJECTION, SOLUTION INTRAMUSCULAR; INTRAVENOUS
Status: DISCONTINUED | OUTPATIENT
Start: 2022-01-01 | End: 2022-01-01 | Stop reason: HOSPADM

## 2022-01-01 RX ORDER — ACETAMINOPHEN 500 MG
1000 TABLET ORAL EVERY 6 HOURS
Status: DISCONTINUED | OUTPATIENT
Start: 2022-01-01 | End: 2022-01-01 | Stop reason: HOSPADM

## 2022-01-01 RX ORDER — NALOXONE HYDROCHLORIDE 0.4 MG/ML
0.4 INJECTION, SOLUTION INTRAMUSCULAR; INTRAVENOUS; SUBCUTANEOUS AS NEEDED
Status: DISCONTINUED | OUTPATIENT
Start: 2022-01-01 | End: 2022-01-01 | Stop reason: HOSPADM

## 2022-01-01 RX ORDER — DIPHENHYDRAMINE HYDROCHLORIDE 50 MG/ML
50 INJECTION, SOLUTION INTRAMUSCULAR; INTRAVENOUS AS NEEDED
Status: ACTIVE | OUTPATIENT
Start: 2022-01-01 | End: 2022-01-01

## 2022-01-01 RX ORDER — PROPOFOL 10 MG/ML
INJECTION, EMULSION INTRAVENOUS
Status: DISCONTINUED | OUTPATIENT
Start: 2022-01-01 | End: 2022-01-01 | Stop reason: HOSPADM

## 2022-01-01 RX ORDER — SENNOSIDES 8.6 MG/1
2 TABLET ORAL
Status: DISCONTINUED | OUTPATIENT
Start: 2022-01-01 | End: 2022-01-01 | Stop reason: HOSPADM

## 2022-01-01 RX ORDER — ALBUTEROL SULFATE 0.83 MG/ML
2.5 SOLUTION RESPIRATORY (INHALATION) AS NEEDED
Status: DISCONTINUED | OUTPATIENT
Start: 2022-01-01 | End: 2022-01-01 | Stop reason: HOSPADM

## 2022-01-01 RX ORDER — POTASSIUM CHLORIDE 750 MG/1
40 TABLET, EXTENDED RELEASE ORAL
Status: COMPLETED | OUTPATIENT
Start: 2022-01-01 | End: 2022-01-01

## 2022-01-01 RX ORDER — SODIUM CHLORIDE 9 MG/ML
10 INJECTION INTRAMUSCULAR; INTRAVENOUS; SUBCUTANEOUS AS NEEDED
Status: CANCELLED | OUTPATIENT
Start: 2022-01-01

## 2022-01-01 RX ORDER — DIPHENHYDRAMINE HYDROCHLORIDE 50 MG/ML
25 INJECTION, SOLUTION INTRAMUSCULAR; INTRAVENOUS AS NEEDED
Status: ACTIVE | OUTPATIENT
Start: 2022-01-01 | End: 2022-01-01

## 2022-01-01 RX ORDER — POTASSIUM CHLORIDE 29.8 MG/ML
40 INJECTION INTRAVENOUS ONCE
Status: COMPLETED | OUTPATIENT
Start: 2022-01-01 | End: 2022-01-01

## 2022-01-01 RX ORDER — SODIUM CHLORIDE 0.9 % (FLUSH) 0.9 %
5-10 SYRINGE (ML) INJECTION EVERY 8 HOURS
Status: DISCONTINUED | OUTPATIENT
Start: 2022-01-01 | End: 2022-01-01

## 2022-01-01 RX ORDER — ONDANSETRON 2 MG/ML
8 INJECTION INTRAMUSCULAR; INTRAVENOUS AS NEEDED
Status: ACTIVE | OUTPATIENT
Start: 2022-01-01 | End: 2022-01-01

## 2022-01-01 RX ORDER — LIDOCAINE HYDROCHLORIDE AND EPINEPHRINE 10; 10 MG/ML; UG/ML
2-20 INJECTION, SOLUTION INFILTRATION; PERINEURAL ONCE
Status: COMPLETED | OUTPATIENT
Start: 2022-01-01 | End: 2022-01-01

## 2022-01-01 RX ORDER — ROCURONIUM BROMIDE 10 MG/ML
INJECTION, SOLUTION INTRAVENOUS AS NEEDED
Status: DISCONTINUED | OUTPATIENT
Start: 2022-01-01 | End: 2022-01-01 | Stop reason: HOSPADM

## 2022-01-01 RX ORDER — HYDROMORPHONE HYDROCHLORIDE 1 MG/ML
0.5 INJECTION, SOLUTION INTRAMUSCULAR; INTRAVENOUS; SUBCUTANEOUS
Status: DISCONTINUED | OUTPATIENT
Start: 2022-01-01 | End: 2022-01-01 | Stop reason: HOSPADM

## 2022-01-01 RX ORDER — GLYCOPYRROLATE 0.2 MG/ML
0.1 INJECTION INTRAMUSCULAR; INTRAVENOUS 3 TIMES DAILY
Status: DISCONTINUED | OUTPATIENT
Start: 2022-01-01 | End: 2022-01-01 | Stop reason: HOSPADM

## 2022-01-01 RX ORDER — POLYETHYLENE GLYCOL 3350 17 G/17G
17 POWDER, FOR SOLUTION ORAL
Status: DISCONTINUED | OUTPATIENT
Start: 2022-01-01 | End: 2022-01-01 | Stop reason: HOSPADM

## 2022-01-01 RX ORDER — SENNOSIDES 8.6 MG/1
2 TABLET ORAL 2 TIMES DAILY
Status: DISCONTINUED | OUTPATIENT
Start: 2022-01-01 | End: 2022-01-01

## 2022-01-01 RX ORDER — NEOSTIGMINE METHYLSULFATE 1 MG/ML
INJECTION, SOLUTION INTRAVENOUS AS NEEDED
Status: DISCONTINUED | OUTPATIENT
Start: 2022-01-01 | End: 2022-01-01 | Stop reason: HOSPADM

## 2022-01-01 RX ORDER — ONDANSETRON 4 MG/1
4 TABLET, ORALLY DISINTEGRATING ORAL
Status: DISCONTINUED | OUTPATIENT
Start: 2022-01-01 | End: 2022-01-01 | Stop reason: HOSPADM

## 2022-01-01 RX ORDER — FENTANYL CITRATE 50 UG/ML
INJECTION, SOLUTION INTRAMUSCULAR; INTRAVENOUS AS NEEDED
Status: DISCONTINUED | OUTPATIENT
Start: 2022-01-01 | End: 2022-01-01 | Stop reason: HOSPADM

## 2022-01-01 RX ORDER — CHLOROPROCAINE HYDROCHLORIDE 30 MG/ML
2-20 INJECTION, SOLUTION EPIDURAL; INFILTRATION; INTRACAUDAL; PERINEURAL ONCE
Status: DISCONTINUED | OUTPATIENT
Start: 2022-01-01 | End: 2022-01-01

## 2022-01-01 RX ORDER — GUAIFENESIN 100 MG/5ML
81 LIQUID (ML) ORAL DAILY
Status: DISCONTINUED | OUTPATIENT
Start: 2022-01-01 | End: 2022-01-01 | Stop reason: HOSPADM

## 2022-01-01 RX ORDER — ONDANSETRON 4 MG/1
4 TABLET, ORALLY DISINTEGRATING ORAL
Status: DISCONTINUED | OUTPATIENT
Start: 2022-01-01 | End: 2022-01-01

## 2022-01-01 RX ORDER — SODIUM CHLORIDE 9 MG/ML
1000 INJECTION, SOLUTION INTRAVENOUS ONCE
Status: COMPLETED | OUTPATIENT
Start: 2022-01-01 | End: 2022-01-01

## 2022-01-01 RX ORDER — PROCHLORPERAZINE EDISYLATE 5 MG/ML
5 INJECTION INTRAMUSCULAR; INTRAVENOUS ONCE
Status: DISCONTINUED | OUTPATIENT
Start: 2022-01-01 | End: 2022-01-01 | Stop reason: HOSPADM

## 2022-01-01 RX ORDER — SILVER SULFADIAZINE 10 G/1000G
CREAM TOPICAL DAILY
Status: DISCONTINUED | OUTPATIENT
Start: 2022-01-01 | End: 2022-01-01

## 2022-01-01 RX ORDER — ONDANSETRON 2 MG/ML
4 INJECTION INTRAMUSCULAR; INTRAVENOUS
Status: DISCONTINUED | OUTPATIENT
Start: 2022-01-01 | End: 2022-01-01

## 2022-01-01 RX ORDER — SODIUM CHLORIDE, SODIUM LACTATE, POTASSIUM CHLORIDE, CALCIUM CHLORIDE 600; 310; 30; 20 MG/100ML; MG/100ML; MG/100ML; MG/100ML
75 INJECTION, SOLUTION INTRAVENOUS CONTINUOUS
Status: DISPENSED | OUTPATIENT
Start: 2022-01-01 | End: 2022-01-01

## 2022-01-01 RX ORDER — HYDROMORPHONE HYDROCHLORIDE 1 MG/ML
0.5 INJECTION, SOLUTION INTRAMUSCULAR; INTRAVENOUS; SUBCUTANEOUS AS NEEDED
Status: DISCONTINUED | OUTPATIENT
Start: 2022-01-01 | End: 2022-01-01

## 2022-01-01 RX ORDER — CEFAZOLIN SODIUM/WATER 2 G/20 ML
2 SYRINGE (ML) INTRAVENOUS ONCE
Status: COMPLETED | OUTPATIENT
Start: 2022-01-01 | End: 2022-01-01

## 2022-01-01 RX ORDER — HYDROMORPHONE HYDROCHLORIDE 2 MG/ML
INJECTION, SOLUTION INTRAMUSCULAR; INTRAVENOUS; SUBCUTANEOUS AS NEEDED
Status: DISCONTINUED | OUTPATIENT
Start: 2022-01-01 | End: 2022-01-01 | Stop reason: HOSPADM

## 2022-01-01 RX ORDER — SODIUM CHLORIDE 0.9 % (FLUSH) 0.9 %
10 SYRINGE (ML) INJECTION AS NEEDED
Status: ACTIVE | OUTPATIENT
Start: 2022-01-01 | End: 2022-01-01

## 2022-01-01 RX ORDER — ASPIRIN 81 MG/1
81 TABLET ORAL
COMMUNITY
End: 2022-01-01

## 2022-01-01 RX ORDER — NALOXONE HYDROCHLORIDE 0.4 MG/ML
0.4 INJECTION, SOLUTION INTRAMUSCULAR; INTRAVENOUS; SUBCUTANEOUS AS NEEDED
Status: DISCONTINUED | OUTPATIENT
Start: 2022-01-01 | End: 2022-01-01 | Stop reason: SDUPTHER

## 2022-01-01 RX ORDER — LIDOCAINE HYDROCHLORIDE ANHYDROUS AND DEXTROSE MONOHYDRATE .8; 5 G/100ML; G/100ML
1 INJECTION, SOLUTION INTRAVENOUS CONTINUOUS
Status: ACTIVE | OUTPATIENT
Start: 2022-01-01 | End: 2022-01-01

## 2022-01-01 RX ORDER — DIPHENHYDRAMINE HCL 25 MG
25 CAPSULE ORAL
Status: DISCONTINUED | OUTPATIENT
Start: 2022-01-01 | End: 2022-01-01 | Stop reason: HOSPADM

## 2022-01-01 RX ORDER — LIDOCAINE HYDROCHLORIDE 10 MG/ML
0.1 INJECTION INFILTRATION; PERINEURAL AS NEEDED
Status: DISCONTINUED | OUTPATIENT
Start: 2022-01-01 | End: 2022-01-01 | Stop reason: HOSPADM

## 2022-01-01 RX ORDER — GABAPENTIN 300 MG/1
300 CAPSULE ORAL 3 TIMES DAILY
Status: DISCONTINUED | OUTPATIENT
Start: 2022-01-01 | End: 2022-01-01

## 2022-01-01 RX ORDER — DIPHENHYDRAMINE HYDROCHLORIDE 50 MG/ML
25 INJECTION, SOLUTION INTRAMUSCULAR; INTRAVENOUS
Status: COMPLETED | OUTPATIENT
Start: 2022-01-01 | End: 2022-01-01

## 2022-01-01 RX ORDER — SODIUM CHLORIDE, SODIUM LACTATE, POTASSIUM CHLORIDE, CALCIUM CHLORIDE 600; 310; 30; 20 MG/100ML; MG/100ML; MG/100ML; MG/100ML
25 INJECTION, SOLUTION INTRAVENOUS CONTINUOUS
Status: DISPENSED | OUTPATIENT
Start: 2022-01-01 | End: 2022-01-01

## 2022-01-01 RX ORDER — SODIUM CHLORIDE 9 MG/ML
25 INJECTION, SOLUTION INTRAVENOUS CONTINUOUS
Status: ACTIVE | OUTPATIENT
Start: 2022-01-01 | End: 2022-01-01

## 2022-01-01 RX ORDER — KETAMINE HYDROCHLORIDE 50 MG/ML
INJECTION, SOLUTION INTRAMUSCULAR; INTRAVENOUS AS NEEDED
Status: DISCONTINUED | OUTPATIENT
Start: 2022-01-01 | End: 2022-01-01 | Stop reason: HOSPADM

## 2022-01-01 RX ORDER — ACETAMINOPHEN 500 MG
1000 TABLET ORAL ONCE
Status: DISCONTINUED | OUTPATIENT
Start: 2022-01-01 | End: 2022-01-01 | Stop reason: HOSPADM

## 2022-01-01 RX ORDER — ONDANSETRON 2 MG/ML
INJECTION INTRAMUSCULAR; INTRAVENOUS AS NEEDED
Status: DISCONTINUED | OUTPATIENT
Start: 2022-01-01 | End: 2022-01-01 | Stop reason: HOSPADM

## 2022-01-01 RX ORDER — LORAZEPAM 2 MG/ML
0.5 INJECTION INTRAMUSCULAR
Status: DISCONTINUED | OUTPATIENT
Start: 2022-01-01 | End: 2022-01-01 | Stop reason: HOSPADM

## 2022-01-01 RX ORDER — POLYETHYLENE GLYCOL 3350 17 G/17G
17 POWDER, FOR SOLUTION ORAL 2 TIMES DAILY
Status: DISCONTINUED | OUTPATIENT
Start: 2022-01-01 | End: 2022-01-01

## 2022-01-01 RX ORDER — LANOLIN ALCOHOL/MO/W.PET/CERES
400 CREAM (GRAM) TOPICAL 2 TIMES DAILY
Qty: 10 TABLET | Refills: 0 | Status: SHIPPED | OUTPATIENT
Start: 2022-01-01 | End: 2022-01-01 | Stop reason: ALTCHOICE

## 2022-01-01 RX ORDER — POTASSIUM CHLORIDE 14.9 MG/ML
20 INJECTION INTRAVENOUS ONCE
Status: COMPLETED | OUTPATIENT
Start: 2022-01-01 | End: 2022-01-01

## 2022-01-01 RX ORDER — HEPARIN 100 UNIT/ML
300-500 SYRINGE INTRAVENOUS AS NEEDED
Status: DISCONTINUED | OUTPATIENT
Start: 2022-01-01 | End: 2022-01-01 | Stop reason: HOSPADM

## 2022-01-01 RX ADMIN — ONDANSETRON 4 MG: 4 TABLET, ORALLY DISINTEGRATING ORAL at 11:38

## 2022-01-01 RX ADMIN — SODIUM CHLORIDE, PRESERVATIVE FREE 10 ML: 5 INJECTION INTRAVENOUS at 16:51

## 2022-01-01 RX ADMIN — GABAPENTIN 300 MG: 300 CAPSULE ORAL at 16:17

## 2022-01-01 RX ADMIN — SODIUM CHLORIDE, SODIUM LACTATE, POTASSIUM CHLORIDE, AND CALCIUM CHLORIDE 75 ML/HR: 600; 310; 30; 20 INJECTION, SOLUTION INTRAVENOUS at 17:43

## 2022-01-01 RX ADMIN — PHENYLEPHRINE HYDROCHLORIDE 100 MCG: 10 INJECTION INTRAVENOUS at 13:08

## 2022-01-01 RX ADMIN — GLYCOPYRROLATE 0.1 MG: 0.2 INJECTION, SOLUTION INTRAMUSCULAR; INTRAVENOUS at 17:51

## 2022-01-01 RX ADMIN — HYDROCODONE BITARTRATE AND ACETAMINOPHEN 1 TABLET: 5; 325 TABLET ORAL at 18:53

## 2022-01-01 RX ADMIN — IOPAMIDOL 6 ML: 612 INJECTION, SOLUTION INTRAVENOUS at 12:25

## 2022-01-01 RX ADMIN — ACETAMINOPHEN 1000 MG: 500 TABLET, FILM COATED ORAL at 05:25

## 2022-01-01 RX ADMIN — NALOXEGOL OXALATE 12.5 MG: 12.5 TABLET, FILM COATED ORAL at 05:33

## 2022-01-01 RX ADMIN — HYDROCODONE BITARTRATE AND ACETAMINOPHEN 1 TABLET: 5; 325 TABLET ORAL at 22:05

## 2022-01-01 RX ADMIN — SODIUM CHLORIDE, PRESERVATIVE FREE 10 ML: 5 INJECTION INTRAVENOUS at 21:18

## 2022-01-01 RX ADMIN — ASPIRIN 81 MG: 81 TABLET, CHEWABLE ORAL at 08:00

## 2022-01-01 RX ADMIN — SODIUM CHLORIDE, PRESERVATIVE FREE 10 ML: 5 INJECTION INTRAVENOUS at 21:09

## 2022-01-01 RX ADMIN — DEXTROSE MONOHYDRATE 100 ML/HR: 5 INJECTION, SOLUTION INTRAVENOUS at 13:41

## 2022-01-01 RX ADMIN — SODIUM CHLORIDE 500 MG: 9 INJECTION, SOLUTION INTRAVENOUS at 11:07

## 2022-01-01 RX ADMIN — SODIUM CHLORIDE, PRESERVATIVE FREE 10 ML: 5 INJECTION INTRAVENOUS at 22:00

## 2022-01-01 RX ADMIN — FILGRASTIM-AAFI 300 MCG: 300 INJECTION, SOLUTION SUBCUTANEOUS at 09:22

## 2022-01-01 RX ADMIN — GEMCITABINE HYDROCHLORIDE 1170 MG: 1 INJECTION, SOLUTION INTRAVENOUS at 16:30

## 2022-01-01 RX ADMIN — SODIUM CHLORIDE 1000 ML: 900 INJECTION, SOLUTION INTRAVENOUS at 15:56

## 2022-01-01 RX ADMIN — PROPOFOL 160 MCG/KG/MIN: 10 INJECTION, EMULSION INTRAVENOUS at 12:46

## 2022-01-01 RX ADMIN — DIPHENHYDRAMINE HYDROCHLORIDE 25 MG: 50 INJECTION, SOLUTION INTRAMUSCULAR; INTRAVENOUS at 10:16

## 2022-01-01 RX ADMIN — ACETAMINOPHEN 1000 MG: 500 TABLET, FILM COATED ORAL at 11:57

## 2022-01-01 RX ADMIN — SODIUM CHLORIDE, PRESERVATIVE FREE 10 ML: 5 INJECTION INTRAVENOUS at 05:16

## 2022-01-01 RX ADMIN — SODIUM CHLORIDE, PRESERVATIVE FREE 10 ML: 5 INJECTION INTRAVENOUS at 05:30

## 2022-01-01 RX ADMIN — KETAMINE HYDROCHLORIDE 30 MG: 50 INJECTION INTRAMUSCULAR; INTRAVENOUS at 07:18

## 2022-01-01 RX ADMIN — ACETAMINOPHEN 650 MG: 325 TABLET ORAL at 06:44

## 2022-01-01 RX ADMIN — PHENYLEPHRINE HYDROCHLORIDE 120 MCG: 10 INJECTION INTRAVENOUS at 07:25

## 2022-01-01 RX ADMIN — CEFEPIME HYDROCHLORIDE 1 G: 1 INJECTION, POWDER, FOR SOLUTION INTRAMUSCULAR; INTRAVENOUS at 10:06

## 2022-01-01 RX ADMIN — SODIUM CHLORIDE 500 MG: 9 INJECTION, SOLUTION INTRAVENOUS at 10:17

## 2022-01-01 RX ADMIN — LIDOCAINE HYDROCHLORIDE ANHYDROUS AND DEXTROSE MONOHYDRATE 1 MG/KG/HR: .8; 5 INJECTION, SOLUTION INTRAVENOUS at 10:09

## 2022-01-01 RX ADMIN — POLYETHYLENE GLYCOL 3350 17 G: 17 POWDER, FOR SOLUTION ORAL at 17:25

## 2022-01-01 RX ADMIN — SODIUM CHLORIDE, PRESERVATIVE FREE 10 ML: 5 INJECTION INTRAVENOUS at 22:31

## 2022-01-01 RX ADMIN — SODIUM CHLORIDE, PRESERVATIVE FREE 10 ML: 5 INJECTION INTRAVENOUS at 16:10

## 2022-01-01 RX ADMIN — HEPARIN SODIUM 5000 UNITS: 5000 INJECTION INTRAVENOUS; SUBCUTANEOUS at 18:01

## 2022-01-01 RX ADMIN — HYDROMORPHONE HYDROCHLORIDE 0.5 MG: 2 INJECTION INTRAMUSCULAR; INTRAVENOUS; SUBCUTANEOUS at 10:34

## 2022-01-01 RX ADMIN — LIDOCAINE HYDROCHLORIDE,EPINEPHRINE BITARTRATE 150 MG: 10; .01 INJECTION, SOLUTION INFILTRATION; PERINEURAL at 12:59

## 2022-01-01 RX ADMIN — KETAMINE HYDROCHLORIDE 20 MG: 50 INJECTION INTRAMUSCULAR; INTRAVENOUS at 08:20

## 2022-01-01 RX ADMIN — SODIUM CHLORIDE, PRESERVATIVE FREE 10 ML: 5 INJECTION INTRAVENOUS at 21:01

## 2022-01-01 RX ADMIN — SODIUM CHLORIDE, PRESERVATIVE FREE 10 ML: 5 INJECTION INTRAVENOUS at 13:31

## 2022-01-01 RX ADMIN — DEXTROSE MONOHYDRATE 100 ML/HR: 5 INJECTION, SOLUTION INTRAVENOUS at 17:50

## 2022-01-01 RX ADMIN — CEFEPIME HYDROCHLORIDE 1 G: 1 INJECTION, POWDER, FOR SOLUTION INTRAMUSCULAR; INTRAVENOUS at 09:22

## 2022-01-01 RX ADMIN — Medication 4 MG: at 08:55

## 2022-01-01 RX ADMIN — SODIUM CHLORIDE, PRESERVATIVE FREE 10 ML: 5 INJECTION INTRAVENOUS at 05:15

## 2022-01-01 RX ADMIN — SODIUM CHLORIDE, SODIUM LACTATE, POTASSIUM CHLORIDE, AND CALCIUM CHLORIDE 75 ML/HR: 600; 310; 30; 20 INJECTION, SOLUTION INTRAVENOUS at 06:36

## 2022-01-01 RX ADMIN — ACETAMINOPHEN 1000 MG: 500 TABLET, FILM COATED ORAL at 00:04

## 2022-01-01 RX ADMIN — HYDROMORPHONE HYDROCHLORIDE 0.5 MG: 2 INJECTION INTRAMUSCULAR; INTRAVENOUS; SUBCUTANEOUS at 09:57

## 2022-01-01 RX ADMIN — ONDANSETRON 4 MG: 2 INJECTION INTRAMUSCULAR; INTRAVENOUS at 08:50

## 2022-01-01 RX ADMIN — SODIUM CHLORIDE, PRESERVATIVE FREE 10 ML: 5 INJECTION INTRAVENOUS at 05:02

## 2022-01-01 RX ADMIN — ACETAMINOPHEN 1000 MG: 500 TABLET, FILM COATED ORAL at 17:09

## 2022-01-01 RX ADMIN — HYDROCODONE BITARTRATE AND ACETAMINOPHEN 1 TABLET: 5; 325 TABLET ORAL at 08:59

## 2022-01-01 RX ADMIN — SODIUM CHLORIDE, PRESERVATIVE FREE 10 ML: 5 INJECTION INTRAVENOUS at 05:26

## 2022-01-01 RX ADMIN — SODIUM CHLORIDE, SODIUM LACTATE, POTASSIUM CHLORIDE, AND CALCIUM CHLORIDE: 600; 310; 30; 20 INJECTION, SOLUTION INTRAVENOUS at 08:15

## 2022-01-01 RX ADMIN — FOSAPREPITANT 150 MG: 150 INJECTION, POWDER, LYOPHILIZED, FOR SOLUTION INTRAVENOUS at 09:42

## 2022-01-01 RX ADMIN — DEXTROSE MONOHYDRATE 100 ML/HR: 5 INJECTION, SOLUTION INTRAVENOUS at 20:46

## 2022-01-01 RX ADMIN — ACETAMINOPHEN 1000 MG: 500 TABLET, FILM COATED ORAL at 05:27

## 2022-01-01 RX ADMIN — HYDROCODONE BITARTRATE AND ACETAMINOPHEN 1 TABLET: 5; 325 TABLET ORAL at 14:53

## 2022-01-01 RX ADMIN — SODIUM CHLORIDE, PRESERVATIVE FREE 10 ML: 5 INJECTION INTRAVENOUS at 14:00

## 2022-01-01 RX ADMIN — SILVER SULFADIAZINE: 10 CREAM TOPICAL at 09:00

## 2022-01-01 RX ADMIN — SENNOSIDES 17.2 MG: 8.6 TABLET, FILM COATED ORAL at 09:08

## 2022-01-01 RX ADMIN — ROCURONIUM BROMIDE 50 MG: 10 INJECTION, SOLUTION INTRAVENOUS at 07:19

## 2022-01-01 RX ADMIN — PHENYLEPHRINE HYDROCHLORIDE 50 MCG: 10 INJECTION INTRAVENOUS at 12:58

## 2022-01-01 RX ADMIN — SODIUM CHLORIDE 10 ML: 9 INJECTION, SOLUTION INTRAVENOUS at 14:06

## 2022-01-01 RX ADMIN — SODIUM CHLORIDE, PRESERVATIVE FREE 10 ML: 5 INJECTION INTRAVENOUS at 16:52

## 2022-01-01 RX ADMIN — HYDROCODONE BITARTRATE AND ACETAMINOPHEN 1 TABLET: 5; 325 TABLET ORAL at 12:20

## 2022-01-01 RX ADMIN — ASPIRIN 81 MG: 81 TABLET, CHEWABLE ORAL at 08:50

## 2022-01-01 RX ADMIN — DEXAMETHASONE SODIUM PHOSPHATE 12 MG: 4 INJECTION, SOLUTION INTRAMUSCULAR; INTRAVENOUS at 09:26

## 2022-01-01 RX ADMIN — PANTOPRAZOLE SODIUM 40 MG: 40 INJECTION, POWDER, FOR SOLUTION INTRAVENOUS at 10:05

## 2022-01-01 RX ADMIN — HYDROCODONE BITARTRATE AND ACETAMINOPHEN 1 TABLET: 5; 325 TABLET ORAL at 05:03

## 2022-01-01 RX ADMIN — HYDROCODONE BITARTRATE AND ACETAMINOPHEN 1 TABLET: 5; 325 TABLET ORAL at 09:20

## 2022-01-01 RX ADMIN — SODIUM CHLORIDE, SODIUM LACTATE, POTASSIUM CHLORIDE, AND CALCIUM CHLORIDE 125 ML/HR: 600; 310; 30; 20 INJECTION, SOLUTION INTRAVENOUS at 20:05

## 2022-01-01 RX ADMIN — SODIUM CHLORIDE, PRESERVATIVE FREE 10 ML: 5 INJECTION INTRAVENOUS at 23:12

## 2022-01-01 RX ADMIN — SODIUM CHLORIDE, PRESERVATIVE FREE 10 ML: 5 INJECTION INTRAVENOUS at 21:27

## 2022-01-01 RX ADMIN — SODIUM CHLORIDE, SODIUM LACTATE, POTASSIUM CHLORIDE, AND CALCIUM CHLORIDE 150 ML/HR: 600; 310; 30; 20 INJECTION, SOLUTION INTRAVENOUS at 14:26

## 2022-01-01 RX ADMIN — HYDROMORPHONE HYDROCHLORIDE 0.5 MG: 1 INJECTION, SOLUTION INTRAMUSCULAR; INTRAVENOUS; SUBCUTANEOUS at 04:22

## 2022-01-01 RX ADMIN — HYDROMORPHONE HYDROCHLORIDE 0.5 MG: 1 INJECTION, SOLUTION INTRAMUSCULAR; INTRAVENOUS; SUBCUTANEOUS at 22:18

## 2022-01-01 RX ADMIN — ONDANSETRON 4 MG: 2 INJECTION INTRAMUSCULAR; INTRAVENOUS at 16:08

## 2022-01-01 RX ADMIN — MAGNESIUM SULFATE HEPTAHYDRATE 2 G: 40 INJECTION, SOLUTION INTRAVENOUS at 10:23

## 2022-01-01 RX ADMIN — SODIUM CHLORIDE, PRESERVATIVE FREE 10 ML: 5 INJECTION INTRAVENOUS at 05:03

## 2022-01-01 RX ADMIN — SODIUM CHLORIDE 100 ML/HR: 900 INJECTION, SOLUTION INTRAVENOUS at 18:04

## 2022-01-01 RX ADMIN — HYDROCODONE BITARTRATE AND ACETAMINOPHEN 1 TABLET: 5; 325 TABLET ORAL at 17:56

## 2022-01-01 RX ADMIN — SODIUM CHLORIDE, SODIUM LACTATE, POTASSIUM CHLORIDE, AND CALCIUM CHLORIDE 25 ML/HR: 600; 310; 30; 20 INJECTION, SOLUTION INTRAVENOUS at 16:49

## 2022-01-01 RX ADMIN — ACETAMINOPHEN 650 MG: 325 TABLET ORAL at 22:17

## 2022-01-01 RX ADMIN — HYDROCODONE BITARTRATE AND ACETAMINOPHEN 1 TABLET: 5; 325 TABLET ORAL at 20:19

## 2022-01-01 RX ADMIN — LORAZEPAM 0.5 MG: 2 INJECTION INTRAMUSCULAR; INTRAVENOUS at 20:21

## 2022-01-01 RX ADMIN — HYDROMORPHONE HYDROCHLORIDE 1 MG: 1 INJECTION, SOLUTION INTRAMUSCULAR; INTRAVENOUS; SUBCUTANEOUS at 20:22

## 2022-01-01 RX ADMIN — SODIUM CHLORIDE, PRESERVATIVE FREE 10 ML: 5 INJECTION INTRAVENOUS at 15:25

## 2022-01-01 RX ADMIN — SODIUM CHLORIDE, SODIUM LACTATE, POTASSIUM CHLORIDE, AND CALCIUM CHLORIDE 1000 ML: 600; 310; 30; 20 INJECTION, SOLUTION INTRAVENOUS at 19:23

## 2022-01-01 RX ADMIN — HYDROCODONE BITARTRATE AND ACETAMINOPHEN 1 TABLET: 5; 325 TABLET ORAL at 13:45

## 2022-01-01 RX ADMIN — SODIUM CHLORIDE, PRESERVATIVE FREE 10 ML: 5 INJECTION INTRAVENOUS at 21:13

## 2022-01-01 RX ADMIN — SENNOSIDES 17.2 MG: 8.6 TABLET, FILM COATED ORAL at 07:44

## 2022-01-01 RX ADMIN — MAGNESIUM SULFATE HEPTAHYDRATE 1 G: 1 INJECTION, SOLUTION INTRAVENOUS at 10:18

## 2022-01-01 RX ADMIN — HYDROCODONE BITARTRATE AND ACETAMINOPHEN 1 TABLET: 5; 325 TABLET ORAL at 07:02

## 2022-01-01 RX ADMIN — POLYETHYLENE GLYCOL 3350 17 G: 17 POWDER, FOR SOLUTION ORAL at 17:17

## 2022-01-01 RX ADMIN — SODIUM CHLORIDE 500 MG: 9 INJECTION, SOLUTION INTRAVENOUS at 17:06

## 2022-01-01 RX ADMIN — POTASSIUM CHLORIDE 20 MEQ: 20 TABLET, EXTENDED RELEASE ORAL at 17:38

## 2022-01-01 RX ADMIN — CEFTRIAXONE 1 G: 1 INJECTION, POWDER, FOR SOLUTION INTRAMUSCULAR; INTRAVENOUS at 16:15

## 2022-01-01 RX ADMIN — SODIUM CHLORIDE, PRESERVATIVE FREE 10 ML: 5 INJECTION INTRAVENOUS at 05:04

## 2022-01-01 RX ADMIN — SODIUM CHLORIDE, PRESERVATIVE FREE 10 ML: 5 INJECTION INTRAVENOUS at 16:09

## 2022-01-01 RX ADMIN — CEFEPIME HYDROCHLORIDE 1 G: 1 INJECTION, POWDER, FOR SOLUTION INTRAMUSCULAR; INTRAVENOUS at 10:57

## 2022-01-01 RX ADMIN — SODIUM CHLORIDE 500 MG: 9 INJECTION, SOLUTION INTRAVENOUS at 10:08

## 2022-01-01 RX ADMIN — POLYETHYLENE GLYCOL 3350 17 G: 17 POWDER, FOR SOLUTION ORAL at 09:08

## 2022-01-01 RX ADMIN — ACETAMINOPHEN 1000 MG: 500 TABLET, FILM COATED ORAL at 18:01

## 2022-01-01 RX ADMIN — SODIUM CHLORIDE 100 ML/HR: 900 INJECTION, SOLUTION INTRAVENOUS at 03:16

## 2022-01-01 RX ADMIN — HYDROMORPHONE HYDROCHLORIDE 1 MG: 1 INJECTION, SOLUTION INTRAMUSCULAR; INTRAVENOUS; SUBCUTANEOUS at 05:06

## 2022-01-01 RX ADMIN — HYDROCODONE BITARTRATE AND ACETAMINOPHEN 1 TABLET: 5; 325 TABLET ORAL at 09:08

## 2022-01-01 RX ADMIN — OXYCODONE 5 MG: 5 TABLET ORAL at 07:57

## 2022-01-01 RX ADMIN — SODIUM CHLORIDE, PRESERVATIVE FREE 10 ML: 5 INJECTION INTRAVENOUS at 13:08

## 2022-01-01 RX ADMIN — HEPARIN SODIUM 5000 UNITS: 5000 INJECTION INTRAVENOUS; SUBCUTANEOUS at 18:07

## 2022-01-01 RX ADMIN — CEFTRIAXONE 1 G: 1 INJECTION, POWDER, FOR SOLUTION INTRAMUSCULAR; INTRAVENOUS at 17:17

## 2022-01-01 RX ADMIN — GABAPENTIN 300 MG: 300 CAPSULE ORAL at 08:50

## 2022-01-01 RX ADMIN — HYDROMORPHONE HYDROCHLORIDE 0.4 MG: 1 INJECTION, SOLUTION INTRAMUSCULAR; INTRAVENOUS; SUBCUTANEOUS at 14:20

## 2022-01-01 RX ADMIN — SODIUM CHLORIDE 500 MG: 9 INJECTION, SOLUTION INTRAVENOUS at 17:51

## 2022-01-01 RX ADMIN — GABAPENTIN 300 MG: 300 CAPSULE ORAL at 21:08

## 2022-01-01 RX ADMIN — PROPOFOL 100 MG: 10 INJECTION, EMULSION INTRAVENOUS at 07:18

## 2022-01-01 RX ADMIN — POTASSIUM CHLORIDE 40 MEQ: 20 TABLET, EXTENDED RELEASE ORAL at 20:08

## 2022-01-01 RX ADMIN — ACETAMINOPHEN 650 MG: 650 SUPPOSITORY RECTAL at 10:16

## 2022-01-01 RX ADMIN — SODIUM CHLORIDE, SODIUM LACTATE, POTASSIUM CHLORIDE, AND CALCIUM CHLORIDE 75 ML/HR: 600; 310; 30; 20 INJECTION, SOLUTION INTRAVENOUS at 01:13

## 2022-01-01 RX ADMIN — BUPIVACAINE HYDROCHLORIDE 30 ML: 2.5 INJECTION, SOLUTION EPIDURAL; INFILTRATION; INTRACAUDAL at 07:36

## 2022-01-01 RX ADMIN — HYDROMORPHONE HYDROCHLORIDE 0.2 MG: 2 INJECTION INTRAMUSCULAR; INTRAVENOUS; SUBCUTANEOUS at 08:30

## 2022-01-01 RX ADMIN — SODIUM CHLORIDE, PRESERVATIVE FREE 10 ML: 5 INJECTION INTRAVENOUS at 13:20

## 2022-01-01 RX ADMIN — SODIUM CHLORIDE, PRESERVATIVE FREE 10 ML: 5 INJECTION INTRAVENOUS at 13:10

## 2022-01-01 RX ADMIN — ACETAMINOPHEN 1000 MG: 500 TABLET, FILM COATED ORAL at 12:11

## 2022-01-01 RX ADMIN — SODIUM CHLORIDE 40 MG: 9 INJECTION, SOLUTION INTRAMUSCULAR; INTRAVENOUS; SUBCUTANEOUS at 10:45

## 2022-01-01 RX ADMIN — SODIUM CHLORIDE, PRESERVATIVE FREE 10 ML: 5 INJECTION INTRAVENOUS at 05:27

## 2022-01-01 RX ADMIN — POTASSIUM CHLORIDE 40 MEQ: 10 TABLET, EXTENDED RELEASE ORAL at 14:08

## 2022-01-01 RX ADMIN — SODIUM CHLORIDE, PRESERVATIVE FREE 10 ML: 5 INJECTION INTRAVENOUS at 05:05

## 2022-01-01 RX ADMIN — PHENYLEPHRINE HYDROCHLORIDE 120 MCG: 10 INJECTION INTRAVENOUS at 07:49

## 2022-01-01 RX ADMIN — HYDROCODONE BITARTRATE AND ACETAMINOPHEN 1 TABLET: 5; 325 TABLET ORAL at 05:15

## 2022-01-01 RX ADMIN — HEPARIN SODIUM 5000 UNITS: 5000 INJECTION INTRAVENOUS; SUBCUTANEOUS at 02:40

## 2022-01-01 RX ADMIN — FILGRASTIM-AAFI 300 MCG: 300 INJECTION, SOLUTION SUBCUTANEOUS at 12:45

## 2022-01-01 RX ADMIN — SODIUM CHLORIDE 1000 ML: 9 INJECTION, SOLUTION INTRAVENOUS at 14:06

## 2022-01-01 RX ADMIN — GLYCOPYRROLATE 0.1 MG: 0.2 INJECTION, SOLUTION INTRAMUSCULAR; INTRAVENOUS at 15:54

## 2022-01-01 RX ADMIN — POLYETHYLENE GLYCOL 3350 17 G: 17 POWDER, FOR SOLUTION ORAL at 07:44

## 2022-01-01 RX ADMIN — SODIUM CHLORIDE 40 MG: 9 INJECTION, SOLUTION INTRAMUSCULAR; INTRAVENOUS; SUBCUTANEOUS at 21:01

## 2022-01-01 RX ADMIN — NALOXEGOL OXALATE 12.5 MG: 12.5 TABLET, FILM COATED ORAL at 08:50

## 2022-01-01 RX ADMIN — SODIUM CHLORIDE, PRESERVATIVE FREE 10 ML: 5 INJECTION INTRAVENOUS at 21:17

## 2022-01-01 RX ADMIN — ACETAMINOPHEN 1000 MG: 500 TABLET, FILM COATED ORAL at 23:22

## 2022-01-01 RX ADMIN — LIDOCAINE HYDROCHLORIDE 200 MG: 20 INJECTION, SOLUTION INFILTRATION; PERINEURAL at 12:17

## 2022-01-01 RX ADMIN — LIDOCAINE HYDROCHLORIDE 1 MG/KG/HR: 8 INJECTION, SOLUTION INTRAVENOUS at 08:15

## 2022-01-01 RX ADMIN — PHENYLEPHRINE HYDROCHLORIDE 120 MCG: 10 INJECTION INTRAVENOUS at 08:33

## 2022-01-01 RX ADMIN — SODIUM BICARBONATE: 84 INJECTION, SOLUTION INTRAVENOUS at 10:39

## 2022-01-01 RX ADMIN — DEXAMETHASONE SODIUM PHOSPHATE 4 MG: 4 INJECTION, SOLUTION INTRA-ARTICULAR; INTRALESIONAL; INTRAMUSCULAR; INTRAVENOUS; SOFT TISSUE at 07:36

## 2022-01-01 RX ADMIN — GABAPENTIN 300 MG: 300 CAPSULE ORAL at 16:51

## 2022-01-01 RX ADMIN — SODIUM CHLORIDE 40 MG: 9 INJECTION, SOLUTION INTRAMUSCULAR; INTRAVENOUS; SUBCUTANEOUS at 20:56

## 2022-01-01 RX ADMIN — SODIUM CHLORIDE 250 ML: 900 INJECTION, SOLUTION INTRAVENOUS at 16:00

## 2022-01-01 RX ADMIN — ACETAMINOPHEN 650 MG: 325 TABLET ORAL at 03:58

## 2022-01-01 RX ADMIN — CEFTRIAXONE 1 G: 1 INJECTION, POWDER, FOR SOLUTION INTRAMUSCULAR; INTRAVENOUS at 15:33

## 2022-01-01 RX ADMIN — GADOTERIDOL 11 ML: 279.3 INJECTION, SOLUTION INTRAVENOUS at 13:35

## 2022-01-01 RX ADMIN — Medication 2 G: at 12:52

## 2022-01-01 RX ADMIN — SODIUM CHLORIDE, PRESERVATIVE FREE 10 ML: 5 INJECTION INTRAVENOUS at 21:12

## 2022-01-01 RX ADMIN — SODIUM CHLORIDE, PRESERVATIVE FREE 10 ML: 5 INJECTION INTRAVENOUS at 21:02

## 2022-01-01 RX ADMIN — SODIUM CHLORIDE, PRESERVATIVE FREE 10 ML: 5 INJECTION INTRAVENOUS at 22:19

## 2022-01-01 RX ADMIN — PHENYLEPHRINE HYDROCHLORIDE 120 MCG: 10 INJECTION INTRAVENOUS at 07:54

## 2022-01-01 RX ADMIN — HYDROCODONE BITARTRATE AND ACETAMINOPHEN 1 TABLET: 5; 325 TABLET ORAL at 17:17

## 2022-01-01 RX ADMIN — SODIUM CHLORIDE 40 MG: 9 INJECTION, SOLUTION INTRAMUSCULAR; INTRAVENOUS; SUBCUTANEOUS at 08:52

## 2022-01-01 RX ADMIN — ASPIRIN 81 MG: 81 TABLET, CHEWABLE ORAL at 09:17

## 2022-01-01 RX ADMIN — GEMCITABINE HYDROCHLORIDE 1560 MG: 1 INJECTION, SOLUTION INTRAVENOUS at 11:14

## 2022-01-01 RX ADMIN — SODIUM CHLORIDE, SODIUM LACTATE, POTASSIUM CHLORIDE, AND CALCIUM CHLORIDE: 600; 310; 30; 20 INJECTION, SOLUTION INTRAVENOUS at 12:39

## 2022-01-01 RX ADMIN — SODIUM CHLORIDE, PRESERVATIVE FREE 10 ML: 5 INJECTION INTRAVENOUS at 15:08

## 2022-01-01 RX ADMIN — Medication 400 MG: at 09:26

## 2022-01-01 RX ADMIN — SILVER SULFADIAZINE: 10 CREAM TOPICAL at 12:00

## 2022-01-01 RX ADMIN — PHENYLEPHRINE HYDROCHLORIDE 120 MCG: 10 INJECTION INTRAVENOUS at 07:45

## 2022-01-01 RX ADMIN — SODIUM CHLORIDE, PRESERVATIVE FREE 10 ML: 5 INJECTION INTRAVENOUS at 15:07

## 2022-01-01 RX ADMIN — POTASSIUM CHLORIDE 40 MEQ: 400 INJECTION, SOLUTION INTRAVENOUS at 07:15

## 2022-01-01 RX ADMIN — GLYCOPYRROLATE 0.6 MG: 0.2 INJECTION, SOLUTION INTRAMUSCULAR; INTRAVENOUS at 08:55

## 2022-01-01 RX ADMIN — SODIUM CHLORIDE, PRESERVATIVE FREE 10 ML: 5 INJECTION INTRAVENOUS at 12:11

## 2022-01-01 RX ADMIN — PANTOPRAZOLE SODIUM 40 MG: 40 INJECTION, POWDER, FOR SOLUTION INTRAVENOUS at 09:22

## 2022-01-01 RX ADMIN — CEFTRIAXONE 1 G: 1 INJECTION, POWDER, FOR SOLUTION INTRAMUSCULAR; INTRAVENOUS at 22:42

## 2022-01-01 RX ADMIN — ONDANSETRON 8 MG: 2 INJECTION INTRAMUSCULAR; INTRAVENOUS at 09:24

## 2022-01-01 RX ADMIN — SODIUM CHLORIDE, PRESERVATIVE FREE 10 ML: 5 INJECTION INTRAVENOUS at 05:13

## 2022-01-01 RX ADMIN — POTASSIUM CHLORIDE 20 MEQ: 14.9 INJECTION, SOLUTION INTRAVENOUS at 22:59

## 2022-01-01 RX ADMIN — HYDROMORPHONE HYDROCHLORIDE 0.5 MG: 1 INJECTION, SOLUTION INTRAMUSCULAR; INTRAVENOUS; SUBCUTANEOUS at 09:32

## 2022-01-01 RX ADMIN — POLYETHYLENE GLYCOL 3350 17 G: 17 POWDER, FOR SOLUTION ORAL at 08:52

## 2022-01-01 RX ADMIN — GLYCOPYRROLATE 0.1 MG: 0.2 INJECTION, SOLUTION INTRAMUSCULAR; INTRAVENOUS at 20:21

## 2022-01-01 RX ADMIN — PHENYLEPHRINE HYDROCHLORIDE 120 MCG: 10 INJECTION INTRAVENOUS at 07:35

## 2022-01-01 RX ADMIN — ACETAMINOPHEN 650 MG: 325 TABLET ORAL at 09:25

## 2022-01-01 RX ADMIN — SODIUM CHLORIDE, PRESERVATIVE FREE 10 ML: 5 INJECTION INTRAVENOUS at 16:11

## 2022-01-01 RX ADMIN — SODIUM BICARBONATE: 84 INJECTION, SOLUTION INTRAVENOUS at 22:19

## 2022-01-01 RX ADMIN — HEPARIN SODIUM 5000 UNITS: 5000 INJECTION INTRAVENOUS; SUBCUTANEOUS at 19:11

## 2022-01-01 RX ADMIN — DEXTROSE MONOHYDRATE 100 ML/HR: 5 INJECTION, SOLUTION INTRAVENOUS at 03:26

## 2022-01-01 RX ADMIN — HYDROCODONE BITARTRATE AND ACETAMINOPHEN 1 TABLET: 5; 325 TABLET ORAL at 21:23

## 2022-01-01 RX ADMIN — LORAZEPAM 0.5 MG: 2 INJECTION INTRAMUSCULAR; INTRAVENOUS at 02:38

## 2022-01-01 RX ADMIN — GLYCOPYRROLATE 0.1 MG: 0.2 INJECTION, SOLUTION INTRAMUSCULAR; INTRAVENOUS at 08:24

## 2022-01-01 RX ADMIN — HYDROCODONE BITARTRATE AND ACETAMINOPHEN 1 TABLET: 5; 325 TABLET ORAL at 01:50

## 2022-01-01 RX ADMIN — GLYCOPYRROLATE 0.1 MG: 0.2 INJECTION, SOLUTION INTRAMUSCULAR; INTRAVENOUS at 10:16

## 2022-01-01 RX ADMIN — CEFTRIAXONE 1 G: 1 INJECTION, POWDER, FOR SOLUTION INTRAMUSCULAR; INTRAVENOUS at 16:08

## 2022-01-01 RX ADMIN — ACETAMINOPHEN 1000 MG: 500 TABLET, FILM COATED ORAL at 05:30

## 2022-01-01 RX ADMIN — ACETAMINOPHEN 1000 MG: 500 TABLET, FILM COATED ORAL at 12:16

## 2022-01-01 RX ADMIN — PHENYLEPHRINE HYDROCHLORIDE 120 MCG: 10 INJECTION INTRAVENOUS at 08:09

## 2022-01-01 RX ADMIN — HEPARIN SODIUM 5000 UNITS: 5000 INJECTION INTRAVENOUS; SUBCUTANEOUS at 11:57

## 2022-01-01 RX ADMIN — HYDROMORPHONE HYDROCHLORIDE 0.5 MG: 1 INJECTION, SOLUTION INTRAMUSCULAR; INTRAVENOUS; SUBCUTANEOUS at 16:08

## 2022-01-01 RX ADMIN — ACETAMINOPHEN 1000 MG: 500 TABLET, FILM COATED ORAL at 00:03

## 2022-01-01 RX ADMIN — PHENYLEPHRINE HYDROCHLORIDE 100 MCG: 10 INJECTION INTRAVENOUS at 07:19

## 2022-01-01 RX ADMIN — SODIUM BICARBONATE: 84 INJECTION, SOLUTION INTRAVENOUS at 04:45

## 2022-01-01 RX ADMIN — SODIUM CHLORIDE 1000 ML: 9 INJECTION, SOLUTION INTRAVENOUS at 10:24

## 2022-01-01 RX ADMIN — HEPARIN SODIUM 5000 UNITS: 5000 INJECTION INTRAVENOUS; SUBCUTANEOUS at 11:39

## 2022-01-01 RX ADMIN — NALOXEGOL OXALATE 12.5 MG: 12.5 TABLET, FILM COATED ORAL at 17:51

## 2022-01-01 RX ADMIN — HEPARIN SODIUM (PORCINE) LOCK FLUSH IV SOLN 100 UNIT/ML 500 UNITS: 100 SOLUTION at 13:10

## 2022-01-01 RX ADMIN — HYDROCODONE BITARTRATE AND ACETAMINOPHEN 1 TABLET: 5; 325 TABLET ORAL at 10:47

## 2022-01-01 RX ADMIN — HYDROMORPHONE HYDROCHLORIDE 1 MG: 1 INJECTION, SOLUTION INTRAMUSCULAR; INTRAVENOUS; SUBCUTANEOUS at 02:38

## 2022-01-01 RX ADMIN — HYDROMORPHONE HYDROCHLORIDE 0.2 MG: 2 INJECTION INTRAMUSCULAR; INTRAVENOUS; SUBCUTANEOUS at 09:06

## 2022-01-01 RX ADMIN — BUPIVACAINE HYDROCHLORIDE 30 ML: 2.5 INJECTION, SOLUTION EPIDURAL; INFILTRATION; INTRACAUDAL; PERINEURAL at 07:33

## 2022-01-01 RX ADMIN — Medication 1 EACH: at 21:26

## 2022-01-01 RX ADMIN — HEPARIN SODIUM 5000 UNITS: 5000 INJECTION INTRAVENOUS; SUBCUTANEOUS at 02:00

## 2022-01-01 RX ADMIN — PANTOPRAZOLE SODIUM 40 MG: 40 INJECTION, POWDER, FOR SOLUTION INTRAVENOUS at 11:00

## 2022-01-01 RX ADMIN — FENTANYL CITRATE 50 MCG: 50 INJECTION INTRAMUSCULAR; INTRAVENOUS at 07:36

## 2022-01-01 RX ADMIN — SODIUM CHLORIDE, SODIUM LACTATE, POTASSIUM CHLORIDE, AND CALCIUM CHLORIDE 75 ML/HR: 600; 310; 30; 20 INJECTION, SOLUTION INTRAVENOUS at 21:24

## 2022-01-01 RX ADMIN — ACETAMINOPHEN 650 MG: 325 TABLET ORAL at 20:08

## 2022-01-01 RX ADMIN — PHENYLEPHRINE HYDROCHLORIDE 100 MCG: 10 INJECTION INTRAVENOUS at 13:03

## 2022-01-01 RX ADMIN — HYDROMORPHONE HYDROCHLORIDE 0.4 MG: 1 INJECTION, SOLUTION INTRAMUSCULAR; INTRAVENOUS; SUBCUTANEOUS at 18:15

## 2022-01-01 RX ADMIN — SENNOSIDES 17.2 MG: 8.6 TABLET, FILM COATED ORAL at 17:25

## 2022-01-01 RX ADMIN — HEPARIN SODIUM 5000 UNITS: 5000 INJECTION INTRAVENOUS; SUBCUTANEOUS at 10:23

## 2022-01-01 RX ADMIN — CARBOPLATIN 273 MG: 10 INJECTION, SOLUTION INTRAVENOUS at 10:22

## 2022-01-01 RX ADMIN — OXYCODONE 5 MG: 5 TABLET ORAL at 01:54

## 2022-01-01 RX ADMIN — ASPIRIN 81 MG: 81 TABLET, CHEWABLE ORAL at 12:20

## 2022-01-01 RX ADMIN — ACETAMINOPHEN 1000 MG: 500 TABLET, FILM COATED ORAL at 12:20

## 2022-01-01 RX ADMIN — GLYCOPYRROLATE 0.1 MG: 0.2 INJECTION, SOLUTION INTRAMUSCULAR; INTRAVENOUS at 22:18

## 2022-01-01 RX ADMIN — LIDOCAINE HYDROCHLORIDE 50 MG: 20 INJECTION, SOLUTION EPIDURAL; INFILTRATION; INTRACAUDAL; PERINEURAL at 07:18

## 2022-01-01 RX ADMIN — MIDAZOLAM 0.5 MG: 1 INJECTION INTRAMUSCULAR; INTRAVENOUS at 12:15

## 2022-01-01 RX ADMIN — CEFEPIME HYDROCHLORIDE 1 G: 1 INJECTION, POWDER, FOR SOLUTION INTRAMUSCULAR; INTRAVENOUS at 10:18

## 2022-01-01 RX ADMIN — ACETAMINOPHEN 650 MG: 325 TABLET ORAL at 16:15

## 2022-01-01 RX ADMIN — HYDROCODONE BITARTRATE AND ACETAMINOPHEN 1 TABLET: 5; 325 TABLET ORAL at 01:27

## 2022-01-01 RX ADMIN — NALOXEGOL OXALATE 12.5 MG: 12.5 TABLET, FILM COATED ORAL at 08:00

## 2022-01-01 RX ADMIN — CEFTRIAXONE 1 G: 1 INJECTION, POWDER, FOR SOLUTION INTRAMUSCULAR; INTRAVENOUS at 07:45

## 2022-01-01 RX ADMIN — HYDROCODONE BITARTRATE AND ACETAMINOPHEN 1 TABLET: 5; 325 TABLET ORAL at 05:33

## 2022-01-01 RX ADMIN — HEPARIN SODIUM 5000 UNITS: 5000 INJECTION INTRAVENOUS; SUBCUTANEOUS at 02:29

## 2022-01-01 RX ADMIN — HYDROCODONE BITARTRATE AND ACETAMINOPHEN 1 TABLET: 5; 325 TABLET ORAL at 15:33

## 2022-01-01 RX ADMIN — FENTANYL CITRATE 50 MCG: 50 INJECTION INTRAMUSCULAR; INTRAVENOUS at 07:18

## 2022-01-01 RX ADMIN — HYDROCODONE BITARTRATE AND ACETAMINOPHEN 1 TABLET: 5; 325 TABLET ORAL at 22:58

## 2022-01-01 RX ADMIN — ACETAMINOPHEN 650 MG: 325 TABLET ORAL at 03:16

## 2022-01-01 RX ADMIN — SODIUM CHLORIDE, PRESERVATIVE FREE 10 ML: 5 INJECTION INTRAVENOUS at 16:08

## 2022-01-01 RX ADMIN — ONDANSETRON 8 MG: 2 INJECTION INTRAMUSCULAR; INTRAVENOUS at 15:55

## 2022-01-01 RX ADMIN — Medication 20 MCG/MIN: at 08:15

## 2022-01-01 RX ADMIN — PHENYLEPHRINE HYDROCHLORIDE 120 MCG: 10 INJECTION INTRAVENOUS at 07:42

## 2022-01-01 RX ADMIN — OXYCODONE 5 MG: 5 TABLET ORAL at 19:11

## 2022-01-01 RX ADMIN — LIDOCAINE HYDROCHLORIDE 40 MG: 20 INJECTION, SOLUTION EPIDURAL; INFILTRATION; INTRACAUDAL; PERINEURAL at 12:45

## 2022-01-01 RX ADMIN — SODIUM CHLORIDE 25 ML/HR: 900 INJECTION, SOLUTION INTRAVENOUS at 09:23

## 2022-01-01 RX ADMIN — SODIUM CHLORIDE, PRESERVATIVE FREE 10 ML: 5 INJECTION INTRAVENOUS at 20:46

## 2022-01-01 RX ADMIN — PHENYLEPHRINE HYDROCHLORIDE 120 MCG: 10 INJECTION INTRAVENOUS at 08:41

## 2022-01-01 RX ADMIN — SODIUM BICARBONATE: 84 INJECTION, SOLUTION INTRAVENOUS at 04:19

## 2022-01-01 RX ADMIN — NALOXEGOL OXALATE 12.5 MG: 12.5 TABLET, FILM COATED ORAL at 09:17

## 2022-01-01 RX ADMIN — SODIUM CHLORIDE, PRESERVATIVE FREE 10 ML: 5 INJECTION INTRAVENOUS at 13:21

## 2022-01-01 RX ADMIN — DEXAMETHASONE SODIUM PHOSPHATE 4 MG: 4 INJECTION, SOLUTION INTRAMUSCULAR; INTRAVENOUS at 07:33

## 2022-01-01 RX ADMIN — PROPOFOL 30 MG: 10 INJECTION, EMULSION INTRAVENOUS at 12:45

## 2022-01-01 RX ADMIN — GABAPENTIN 300 MG: 300 CAPSULE ORAL at 21:26

## 2022-01-01 RX ADMIN — POTASSIUM CHLORIDE 40 MEQ: 20 TABLET, EXTENDED RELEASE ORAL at 07:38

## 2022-01-01 RX ADMIN — CEFTRIAXONE 1 G: 1 INJECTION, POWDER, FOR SOLUTION INTRAMUSCULAR; INTRAVENOUS at 06:35

## 2022-01-01 RX ADMIN — SODIUM CHLORIDE 500 MG: 9 INJECTION, SOLUTION INTRAVENOUS at 18:00

## 2022-02-02 PROBLEM — G47.33 OSA (OBSTRUCTIVE SLEEP APNEA): Status: ACTIVE | Noted: 2022-01-01

## 2022-02-02 PROBLEM — Z72.0 TOBACCO USE: Status: ACTIVE | Noted: 2022-01-01

## 2022-02-02 PROBLEM — Z01.810 PREOP CARDIOVASCULAR EXAM: Status: ACTIVE | Noted: 2022-01-01

## 2022-02-02 PROBLEM — I50.22 SYSTOLIC CHF, CHRONIC (HCC): Status: ACTIVE | Noted: 2022-01-01

## 2022-02-22 NOTE — PERIOP NOTES
Enhanced Recovery After Surgery: non-diabetic patients    Drink Ensure Surgery Immunonutrition  - one bottle twice daily for 5 days prior to surgery Begin on 02/22/2022 . Do not drink any Ensure Surgery Immunonutrition the day before surgery 02/28/2022 . Ensure Surgery Immunonutrition is the preferred formula over other Ensure formulas as it is the only one that is designed to support immune health and recovery from surgery  The night before surgery 02/28/2022, drink 2 bottles of the Ensure Pre-Surgery drink. The morning of surgery 03/01/2022, drink one bottle of the Ensure Pre-Surgery drink while on your way to the hospital. Drink this over 5-10 minutes. Drink nothing else after drinking the pre-surgical drink the morning of surgery. Bring your patient handbook with you to the hospital.      Things to remember:    1. You will be up on a chair the evening of surgery and drinking clear liquids. Your diet will be advanced by your surgeon as appropriate. 2. Beginning the day after surgery, you will be up in a chair for all meals. 3. Beginning the day after surgery, you will be out of the bed for a minimum of 6 hours (not all at one time)    4. Beginning the day after surgery, you will walk in the ovalle in the ovalle at least 50' at least three times a day. 5. You will be given scheduled non-narcotic pain medication to help keep your pain under control. You will have stronger pain medication ordered for break through pain. 6. All of these measures are geared toward returning your bowel to normal function as soon as possible and to prevent complications associated with bowel blockage, blood clots, and/or pneumonia.

## 2022-02-22 NOTE — PERIOP NOTES
Preoperative Nutrition Screen (GISELL)   Patient's Age: 73 y.o. Patient's BMI: Estimated body mass index is 22.68 kg/m² as calculated from the following:    Height as of an earlier encounter on 2/22/22: 5' 2\" (1.575 m). Weight as of an earlier encounter on 2/22/22: 56.2 kg (124 lb). If the answer to any of the following is Yes, then recommend prescribe Oral Nutrition Supplements (ONS) for at least 7 days prior to surgery and/or order referral to dietitian for further assessment and nutrition therapy. 1. Does the patient have a documented serum albumin less than 3.0 within the last 90 days? Yes = 1      1     2. Is patient's BMI less than 18.5 (or less than 20 if age over 72)? No = 0   0     3. Has the patient had an unplanned weight loss of 10% of body weight or more in the last 6 months? yes   4. Has the patient been eating less than 50% of their normal diet in the preceding week?  yes   GISELL Score (number of Yes responses), 0-4  3     Plan: Dietary consult      Electronically signed by Poppy East RN on 2/22/22 at 2:42 PM

## 2022-02-22 NOTE — PERIOP NOTES
Patient verified name and     Order for consent not found in EHR and; patient verified. Type 3 surgery, walk in assessment complete. Labs per surgeon: none at presesnt time  Labs per anesthesia protocol: cbc,bmp collected and results pending; Type and screen to be done on  day of surgery  EKG: in [facility] from 2022 Cardiac clearance noted in [facility] from [facility], Dr Niles Martin with recommendation. Chart sent down for anesthesia to review    Patient COVID test scheduled 2022. The testing center is located at the [address], [place]. If appointment is needed patient provided telephone number of [phone]. Hospital approved surgical skin cleanser and instructions given per hospital policy. Patient provided with and instructed on educational handouts including Guide to Surgery, Pain Management, Hand Hygiene, Blood Transfusion Education, and [facility] Brochure. Patient answered medical/surgical history questions at their best of ability. All prior to admission medications documented in [facility]. Original medication prescription bottle not visualized during patient appointment. Patient instructed to hold all vitamins 7 days prior to surgery and NSAIDS 5 days prior to surgery, patient verbalized understanding. Patient teach back successful and patient demonstrates knowledge of instructions. Called and spoke with Hope at surgeon's office regarding recommendation from Cariologist and Aspirin. Order received that patient may take Aspirin 81mg daily until further notice.  Patient verbalized understanding of instructions and states will begin Aspirin 81mg tonight

## 2022-02-23 PROBLEM — C67.9 HYDRONEPHROSIS DUE TO OBSTRUCTIVE MALIGNANT BLADDER CANCER (HCC): Status: ACTIVE | Noted: 2022-01-01

## 2022-02-23 PROBLEM — N39.0 ACUTE UTI: Status: ACTIVE | Noted: 2022-01-01

## 2022-02-23 PROBLEM — Q60.0 KIDNEY CONGENITALLY ABSENT, LEFT: Status: ACTIVE | Noted: 2022-01-01

## 2022-02-23 PROBLEM — E87.6 HYPOKALEMIA: Status: ACTIVE | Noted: 2022-01-01

## 2022-02-23 PROBLEM — N17.9 ACUTE RENAL FAILURE (ARF) (HCC): Status: ACTIVE | Noted: 2022-01-01

## 2022-02-23 PROBLEM — N13.30 HYDRONEPHROSIS DUE TO OBSTRUCTIVE MALIGNANT BLADDER CANCER (HCC): Status: ACTIVE | Noted: 2022-01-01

## 2022-02-23 NOTE — ED NOTES
IV access unable to flush. Called Lynnette Cooper RN to see if she was able to get an ultrasound IV on the patient, as blood cultures and antibiotics are needed, and the IV the patient had was no longer able to flush.

## 2022-02-23 NOTE — ED PROVIDER NOTES
82-year-old female with history of bladder cancer who has not yet undergone any chemo radiation presents after being instructed to come here due to abnormal labs. Patient states that it has something to do with her kidneys. She has upcoming surgery with Dr. Joel Zamora of [facility] urology. She denies taking medications or having other medical problems. Past Medical History:   Diagnosis Date    Cancer [facility]) 2021    bladder     Chronic pain     Back    Heart failure ([address]     Echo on 2022 showed Reduced left ventricular systolic function with a visually estimated EF of 45 - 50%. Normal diastolic function.     Sleep apnea     can not destinee c pap at hs       Past Surgical History:   Procedure Laterality Date    HX APPENDECTOMY      HX HERNIA REPAIR      HX HYSTERECTOMY      HX LAP CHOLECYSTECTOMY      HX ORTHOPAEDIC  1984    toe transplant--- toe removed from left foot and placed on left hand    NEUROLOGICAL PROCEDURE UNLISTED      back surgery         Family History:   Problem Relation Age of Onset    Cancer Mother         colon    Cancer Father         lung    Cancer Brother         colon with mets to bladder    Cancer Paternal Uncle        Social History     Socioeconomic History    Marital status:      Spouse name: Not on file    Number of children: Not on file    Years of education: Not on file    Highest education level: Not on file   Occupational History    Not on file   Tobacco Use    Smoking status: Former Smoker     Packs/day: 1.00     Years: 50.00     Pack years: 50.00     Quit date:      Years since quittin.1    Smokeless tobacco: Never Used    Tobacco comment: began smoking age 17   Vaping Use    Vaping Use: Never used   Substance and Sexual Activity    Alcohol use: Never    Drug use: Never    Sexual activity: Not on file   Other Topics Concern    Not on file   Social History Narrative    Not on file     Social Determinants of Health Financial Resource Strain:     Difficulty of Paying Living Expenses: Not on file   Food Insecurity:     Worried About Running Out of Food in the Last Year: Not on file    Aman of Food in the Last Year: Not on file   Transportation Needs:     Lack of Transportation (Medical): Not on file    Lack of Transportation (Non-Medical): Not on file   Physical Activity:     Days of Exercise per Week: Not on file    Minutes of Exercise per Session: Not on file   Stress:     Feeling of Stress : Not on file   Social Connections:     Frequency of Communication with Friends and Family: Not on file    Frequency of Social Gatherings with Friends and Family: Not on file    Attends Anabaptism Services: Not on file    Active Member of [address] [place] [address] or Organizations: Not on file    Attends Club or Organization Meetings: Not on file    Marital Status: Not on file   Intimate Partner Violence:     Fear of Current or Ex-Partner: Not on file    Emotionally Abused: Not on file    Physically Abused: Not on file    Sexually Abused: Not on file   Housing Stability:     Unable to Pay for Housing in the Last Year: Not on file    Number of [place] in the Last Year: Not on file    Unstable Housing in the Last Year: Not on file         ALLERGIES: Doxycycline, Fentanyl, and Morphine    Review of Systems   Constitutional: Positive for fatigue. Negative for chills and fever. HENT: Negative for congestion, rhinorrhea and sore throat. Eyes: Negative for photophobia and redness. Respiratory: Negative for cough and shortness of breath. Cardiovascular: Negative for chest pain and leg swelling. Gastrointestinal: Positive for abdominal pain. Negative for blood in stool, diarrhea, nausea and vomiting. Endocrine: Negative for polydipsia and polyuria. Genitourinary: Positive for decreased urine volume, difficulty urinating and hematuria. Negative for dysuria. Musculoskeletal: Negative for back pain and myalgias. Neurological: Negative for weakness, numbness and headaches. Vitals:    02/23/22 1244   BP: 130/85   Pulse: 89   Resp: 18   Temp: 97.7 °F (36.5 °C)   SpO2: 95%   Weight: 58.2 kg (128 lb 6.4 oz)   Height: 5' 2\" (1.575 m)            Physical Exam  Vitals and nursing note reviewed. Constitutional:       General: She is not in acute distress. Appearance: She is well-developed. HENT:      Head: Normocephalic. Eyes:      Pupils: Pupils are equal, round, and reactive to light. Cardiovascular:      Rate and Rhythm: Normal rate and regular rhythm. Heart sounds: Normal heart sounds. Pulmonary:      Effort: Pulmonary effort is normal.      Breath sounds: Normal breath sounds. Abdominal:      General: Abdomen is protuberant. There is distension. Palpations: Abdomen is soft. There is no mass. Tenderness: There is abdominal tenderness in the right lower quadrant, suprapubic area and left lower quadrant. There is no guarding or rebound. Musculoskeletal:         General: Normal range of motion. Lymphadenopathy:      Cervical: No cervical adenopathy. Skin:     General: Skin is warm and dry. Neurological:      Mental Status: She is alert. MDM  Number of Diagnoses or Management Options  Acute renal failure, unspecified acute renal failure type ([address]  Hydronephrosis due to obstruction of bladder  Malignant neoplasm of urinary bladder, unspecified site [facility])  Urinary tract infection with hematuria, site unspecified  Diagnosis management comments: Review of records reveals patient has acute renal failure. We will check urine for infection admitting and IV hydration. CT scan imaging to evaluate for hydronephrosis from bladder cancer and ureteral blockage versus kidney stone. Anticipate admission. 5:12 PM  Acute renal failure is from hydronephrosis of her so isolated kidney due to extensive bladder cancer. No stone identified.   Urology consulted for probable nephrostomy tube versus less likely stent. Urine was infected in patient's urine and blood have been cultured. IV Rocephin has been provided. No sign of sepsis. 5:57 PM  Hospitalist to admit per urology and they asked that interventional radiology be consulted for nephrostomy tube placement in the morning. Amount and/or Complexity of Data Reviewed  Clinical lab tests: ordered and reviewed (Results for orders placed or performed during the hospital encounter of 02/23/22  -CBC WITH AUTOMATED DIFF:        Result                      Value             Ref Range           WBC                         10.1              4.3 - 11.1 K*       RBC                         3.35 (L)          4.05 - 5.2 M*       HGB                         7.9 (L)           11.7 - 15.4 *       HCT                         27.3 (L)          35.8 - 46.3 %       MCV                         81.5              79.6 - 97.8 *       MCH                         23.6 (L)          26.1 - 32.9 *       MCHC                        28.9 (L)          31.4 - 35.0 *       RDW                         15.5 (H)          11.9 - 14.6 %       PLATELET                    245               150 - 450 K/*       MPV                         9.4               9.4 - 12.3 FL       ABSOLUTE NRBC               0.00              0.0 - 0.2 K/*       DF                          AUTOMATED                             NEUTROPHILS                 84 (H)            43 - 78 %           LYMPHOCYTES                 10 (L)            13 - 44 %           MONOCYTES                   5                 4.0 - 12.0 %        EOSINOPHILS                 1                 0.5 - 7.8 %         BASOPHILS                   0                 0.0 - 2.0 %         IMMATURE GRANULOCYTES       1                 0.0 - 5.0 %         ABS. NEUTROPHILS            8.5 (H)           1.7 - 8.2 K/*       ABS. LYMPHOCYTES            1.0               0.5 - 4.6 K/*       ABS.  MONOCYTES              0.5               0.1 - 1.3 K/*       ABS. EOSINOPHILS            0.1               0.0 - 0.8 K/*       ABS. BASOPHILS              0.0               0.0 - 0.2 K/*       ABS. IMM. GRANS.            0.1               0.0 - 0.5 K/*  -METABOLIC PANEL, COMPREHENSIVE:        Result                      Value             Ref Range           Sodium                      137               136 - 145 mm*       Potassium                   3.1 (L)           3.5 - 5.1 mm*       Chloride                    100               98 - 107 mmo*       CO2                         23                21 - 32 mmol*       Anion gap                   14                7 - 16 mmol/L       Glucose                     104 (H)           65 - 100 mg/*       BUN                         66 (H)            8 - 23 MG/DL        Creatinine                  7.30 (H)          0.6 - 1.0 MG*       GFR est AA                  7 (L)             >60 ml/min/1*       GFR est non-AA              6 (L)             >60 ml/min/1*       Calcium                     8.9               8.3 - 10.4 M*       Bilirubin, total            0.3               0.2 - 1.1 MG*       ALT (SGPT)                  7 (L)             12 - 65 U/L         AST (SGOT)                  12 (L)            15 - 37 U/L         Alk.  phosphatase            65                50 - 136 U/L        Protein, total              6.7               6.3 - 8.2 g/*       Albumin                     2.3 (L)           3.2 - 4.6 g/*       Globulin                    4.4 (H)           2.3 - 3.5 g/*       A-G Ratio                   0.5 (L)           1.2 - 3.5      -LIPASE:        Result                      Value             Ref Range           Lipase                      120               73 - 393 U/L   -URINALYSIS W/ RFLX MICROSCOPIC:        Result                      Value             Ref Range           Color                       BROWN                                 Appearance                  CLOUDY                                Specific gravity            1.020             1.001 - 1.02*       pH (UA)                     7.0               5.0 - 9.0           Protein                                       NEG mg/dL       GREATER THAN/EQUAL  (A)       Glucose                     100 (A)           NEG mg/dL           Ketone                      TRACE (A)         NEG mg/dL           Bilirubin                   SMALL (A)         NEG                 Blood                       LARGE (A)         NEG                 Urobilinogen                0.2               0.2 - 1.0 EU*       Nitrites                    Positive (A)      NEG                 Leukocyte Esterase          MODERATE (A)      NEG                 WBC                         >100              0 /hpf              RBC                         >100              0 /hpf              Epithelial cells            3-5               0 /hpf              Bacteria                    4+ (H)            0 /hpf              Amorphous Crystals          4+ (H)            0                   Other observations                                            RESULTS VERIFIED MANUALLY  )  Tests in the radiology section of CPT®: ordered and reviewed (CT ABD/PEL FOR RENAL STONE    Result Date: 2/23/2022  Exam: CT ABD/PEL FOR RENAL STONE on 2/23/2022 4:41 PM Clinical History: The Female patient is 73years old  presenting for Acute renal failure, urinary tract infection, history of bladder cancer and 1 kidney-ureteral stone with hydronephrosis? Bladder outlet obstruction? . Technique: Thin slice axial images were obtained through the abdomen and pelvis without intravenous and without oral contrast.  Coronal reformatted images were also provided for review. All CT scans at this facility are performed using dose reduction/dose modulation techniques, as appropriate the performed exam, including the following: Automated Exposure Control;  Adjustment of the mA and/or kV according to patient size (this includes techniques or standardized protocols for targeted exams where dose is matched to indication/reason for exam); and Use of Iterative Reconstruction Technique. Radiation Exposure Indices: Reference Air Kerma (Ka,r) = 405 mGy-cm Comparison:  Abdomen pelvis CT 11/2/2021. Findings:  Abdomen: Visualized Lung bases: Clear without evidence of focal infiltrate, consolidation, or effusion. Visualized Liver: Normal size, contour, and density without focal mass. Stable 15 mm cystic lesion in right liver lobe. Biliary: Cholecystectomy. No evidence of intra or extrahepatic biliary dilatation. Pancreas: Normal in size and contour without focal lesion. Spleen: Splenomegaly at 12.5 cm. Adrenal glands: Normal in size without focal lesion. Kidneys: Absent left kidney. Moderate hydronephrosis of the right kidney as well as hydroureter to the level of the bladder. Small cyst adjacent the lateral margin of kidney. Bowel: No evidence of obstruction or focal lesion. Normal appendix Retroperitoneum/vasculature: No evidence of significant adenopathy. Unremarkable aorta and IVC. Abdominal soft tissues: Unremarkable. Osseous structures: No acute osseous abnormality. Surgical changes lower lumbar spine. Pelvis: Large diffusely infiltrative bladder mass circumferentially involving the entire bladder. 1. Diffusely infiltrative the bladder mass resulting in moderate right hydronephrosis and hydroureter. 2. Previous left nephrectomy. . CPT code(s): G0937379, [id number]     )  Obtain history from someone other than the patient: yes  Review and summarize past medical records: yes  Discuss the patient with other providers: yes    Risk of Complications, Morbidity, and/or Mortality  Presenting problems: high  Diagnostic procedures: low  Management options: moderate  General comments: CRITICAL CARE Documentation:    This patient is critically ill and there is a high probability of of imminent or life threatening deterioration in the patient's condition without immediate management. The nature of the patient's clinical problem is: Acute renal failure from hydronephrosis and obstruction due to worsening bladder cancer. I have spent 30 minutes in direct patient care, documentation, review of labs/xrays/old records, discussion with Staff, patient, her significant other . The time involved in the performance of separately reportable procedures was not counted toward critical care time.      Barby Martin MD; 2/23/2022 @5:14 PM        Patient Progress  Patient progress: (Serious condition)         Procedures

## 2022-02-23 NOTE — ACP (ADVANCE CARE PLANNING)
[facility] Hospitalist Service  At the heart of better care     Advance Care Planning   Admit Date:  2022  1:09 PM   Name:  Sean White   Age:  73 y.o. Sex:  female  :  1950   MRN:  [id number]   Room:  [place] [address]    Sean White is able to make her own decisions: Yes    If pt unable to make decisions, POA/surrogate decision maker:  Boyfriend Michael Can      Patient / surrogate decision-maker directed:  Code Status: FULL CODE -full aggressive medical and surgical interventions, including intubations, resuscitations, pressors, artificial tube feeding      Patient or surrogate consented to discussion of the current conditions, workup, management plans, prognosis, and understand the risk for further deterioration. Time spent: 17 minutes in direct discussion (face to face and/or over phone). Signed:   Primitivo Kang DO

## 2022-02-23 NOTE — PERIOP NOTES
Dr. Jessica Archuleta reviewed chart - Cardiology Office Note/Clearance 2/22/22 and Hgb 8.2, Potassium 2.6. No order received. Ok to proceed.

## 2022-02-23 NOTE — H&P
Hospitalist History and Physical   Admit Date:  2022  1:09 PM   Name:  Zo Nelson   Age:  73 y.o. Sex:  female  :  1950   MRN:  [id number]   Room:  [place] [address]    Presenting Complaint: Abdominal Pain    Reason(s) for Admission: Acute renal failure (ARF) ([facility] [address] [N17.9]     History of Present Illness:   Zo Nelson is a 73 y.o. female with medical history of HFrEF (TTE in  with EF 45-50%), bladder cancer who presented to the ED d/t abnormal lab. Pt stated he was diagnosed with bladder cancer in 2021. Per chart review, pt had been having recurrent UTI with hematuria and associated unintentional weight loss, had CT scan in 2021 with mass in bladder strongly suggestive of malignancy. 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 She has history of heavy tobacco use but quit ~20 years ago. She underwent repeat TURBT for tumor debulking on 21. She went to see Urology again on  and decided to move forward with radical cystectomy with ileal conduit. She went to Cardiology for preop clearance and then subsequently preop labs on 22 that showed K 2.6 and Cr 6.84 which was new for her. She was then instructed to come to the ED. She admitted to chills, R flank pain and intermittent hematuria with urination but no fever, SOB, chest pain, N/V, diarrhea or constipation. She denies taking any meds at home. In ED, VSS. K 3.12, Cr 7.3. UA c/w UTI. CTAP shows diffusely infiltrative the bladder mass resulting in moderate right hydronephrosis and hydroureter; Previous left nephrectomy. She was given Rocephin and Dilaudid in ED. Urology was consulted in ED and recommended IR consult for nephrostomy tube. Hospitalist was asked to admit. Review of Systems:  10 systems reviewed and negative except as noted in HPI.   Assessment & Plan:     Acute renal failure with R hydronephrosis  Bladder cancer  Congenitally absent L kidney  Cr on admission 7.3 (baseline <1), BUN 66. RA d/t obstructive uropathy. Follows Dr. Papa Paris of [facility]. Avoid nephrotoxic meds  Accurate I&O's  Gentle MIVF d/t hx of HFrEF  Urology on board, appreciate recs  Consult IR for nephrostomy tube placement, appreciate recs  NPO after midnight    Acute UTI  Abx: Rocephin (2/23-. ..) empirically pending UCx  BCx pending  Obtain Ucx    Hematuria  Normocytic anemia  Hgb on admission was 7.9 (baseline 8-9). 2/2 bladder cancer and UTI. She is not on any AC at home. Avoid AC  Check iron studies  CTM, transfuse if <7    Hypokalemia  Replace and recheck  Check Mag    Chronic HFrEF  Pt does not take any meds at home. Sees [facility] cardiology  TTE in 2/11/22 shows EF 45-50%  Cardiology recommended ASA 81mg every day but pt has not started yet d/t hematuria    THAO  Pt denies using CPAP at home  Will need outpatient sleep study, defer to PCP      Dispo/Discharge Planning:     >2 midnights. PT/OT    Diet: DIET NPO  VTE ppx: SCD's d/t hematuria  Code status: Prior    Hospital Problems as of 2/23/2022 Date Reviewed: 2/22/2022          Codes Class Noted - Resolved POA    Acute renal failure (ARF) ([facility] [address] ICD-10-CM: N17.9  ICD-9-CM: 584.9  2/23/2022 - Present Unknown              Past History:  Past Medical History:   Diagnosis Date    Cancer ([address] 12/2021    bladder     Chronic pain     Back    Heart failure ([address]     Echo on 02/11/2022 showed Reduced left ventricular systolic function with a visually estimated EF of 45 - 50%. Normal diastolic function.     Sleep apnea     can not destinee c pap at hs     Past Surgical History:   Procedure Laterality Date    HX APPENDECTOMY      HX HERNIA REPAIR      HX HYSTERECTOMY      HX LAP CHOLECYSTECTOMY      HX ORTHOPAEDIC  1984    toe transplant--- toe removed from left foot and placed on left hand    NEUROLOGICAL PROCEDURE UNLISTED      back surgery      Allergies   Allergen Reactions    Doxycycline Rash     Other reaction(s): Rash-Allergy    Fentanyl Rash     Other reaction(s): Rash-Allergy    Morphine Rash     Other reaction(s): Rash-Allergy      Social History     Tobacco Use    Smoking status: Former Smoker     Packs/day: 1.00     Years: 50.00     Pack years: 50.00     Quit date:      Years since quittin.1    Smokeless tobacco: Never Used    Tobacco comment: began smoking age 17   Substance Use Topics    Alcohol use: Never      Family History   Problem Relation Age of Onset    Cancer Mother         colon    Cancer Father         lung    Cancer Brother         colon with mets to bladder    Cancer Paternal Uncle       Family history reviewed and negative except as noted above. There is no immunization history for the selected administration types on file for this patient. Prior to Admit Medications:  Current Outpatient Medications   Medication Instructions    aspirin delayed-release 81 mg, Oral, EVERY BEDTIME       Objective:     Patient Vitals for the past 24 hrs:   Temp Pulse Resp BP SpO2   22 1244 97.7 °F (36.5 °C) 89 18 130/85 95 %     Oxygen Therapy  O2 Sat (%): 95 % (22 1244)  O2 Device: None (Room air) (22 1244)    Estimated body mass index is 23.48 kg/m² as calculated from the following:    Height as of this encounter: 5' 2\" (1.575 m). Weight as of this encounter: 58.2 kg (128 lb 6.4 oz). No intake or output data in the 24 hours ending 22 182      Physical Exam:    Blood pressure 130/85, pulse 89, temperature 97.7 °F (36.5 °C), resp. rate 18, height 5' 2\" (1.575 m), weight 58.2 kg (128 lb 6.4 oz), SpO2 95 %. General:    Well nourished. No overt distress. Poor dentition  Head:  Normocephalic, atraumatic  Eyes:  Sclerae appear normal.  Pupils equally round. ENT:  Nares appear normal, no drainage. Moist oral mucosa  Neck:  No restricted ROM. Trachea midline   CV:   RRR. No m/r/g. No jugular venous distension. Lungs:   CTAB.   No wheezing, rhonchi, or rales. Respirations even, unlabored  Abdomen: Bowel sounds present. Soft, mild TTP on R flank and suprapubic region w/o rebound or guarding, nondistended. Extremities: No cyanosis or clubbing. No edema  Skin:     No rashes and normal coloration. Warm and dry. Neuro:  CN II-XII grossly intact. Sensation intact. A&Ox3  Psych:  Normal mood and affect. I have reviewed ordered lab tests and independently visualized imaging below:    Last 24hr Labs:  Recent Results (from the past 24 hour(s))   CBC WITH AUTOMATED DIFF    Collection Time: 02/23/22 12:50 PM   Result Value Ref Range    WBC 10.1 4.3 - 11.1 K/uL    RBC 3.35 (L) 4.05 - 5.2 M/uL    HGB 7.9 (L) 11.7 - 15.4 g/dL    HCT 27.3 (L) 35.8 - 46.3 %    MCV 81.5 79.6 - 97.8 FL    MCH 23.6 (L) 26.1 - 32.9 PG    MCHC 28.9 (L) 31.4 - 35.0 g/dL    RDW 15.5 (H) 11.9 - 14.6 %    PLATELET 306 487 - 059 K/uL    MPV 9.4 9.4 - 12.3 FL    ABSOLUTE NRBC 0.00 0.0 - 0.2 K/uL    DF AUTOMATED      NEUTROPHILS 84 (H) 43 - 78 %    LYMPHOCYTES 10 (L) 13 - 44 %    MONOCYTES 5 4.0 - 12.0 %    EOSINOPHILS 1 0.5 - 7.8 %    BASOPHILS 0 0.0 - 2.0 %    IMMATURE GRANULOCYTES 1 0.0 - 5.0 %    ABS. NEUTROPHILS 8.5 (H) 1.7 - 8.2 K/UL    ABS. LYMPHOCYTES 1.0 0.5 - 4.6 K/UL    ABS. MONOCYTES 0.5 0.1 - 1.3 K/UL    ABS. EOSINOPHILS 0.1 0.0 - 0.8 K/UL    ABS. BASOPHILS 0.0 0.0 - 0.2 K/UL    ABS. IMM.  GRANS. 0.1 0.0 - 0.5 K/UL   METABOLIC PANEL, COMPREHENSIVE    Collection Time: 02/23/22 12:50 PM   Result Value Ref Range    Sodium 137 136 - 145 mmol/L    Potassium 3.1 (L) 3.5 - 5.1 mmol/L    Chloride 100 98 - 107 mmol/L    CO2 23 21 - 32 mmol/L    Anion gap 14 7 - 16 mmol/L    Glucose 104 (H) 65 - 100 mg/dL    BUN 66 (H) 8 - 23 MG/DL    Creatinine 7.30 (H) 0.6 - 1.0 MG/DL    GFR est AA 7 (L) >60 ml/min/1.73m2    GFR est non-AA 6 (L) >60 ml/min/1.73m2    Calcium 8.9 8.3 - 10.4 MG/DL    Bilirubin, total 0.3 0.2 - 1.1 MG/DL    ALT (SGPT) 7 (L) 12 - 65 U/L    AST (SGOT) 12 (L) 15 - 37 U/L    Alk. phosphatase 65 50 - 136 U/L    Protein, total 6.7 6.3 - 8.2 g/dL    Albumin 2.3 (L) 3.2 - 4.6 g/dL    Globulin 4.4 (H) 2.3 - 3.5 g/dL    A-G Ratio 0.5 (L) 1.2 - 3.5     LIPASE    Collection Time: 02/23/22 12:50 PM   Result Value Ref Range    Lipase 120 73 - 393 U/L   URINALYSIS W/ RFLX MICROSCOPIC    Collection Time: 02/23/22  2:10 PM   Result Value Ref Range    Color BROWN      Appearance CLOUDY      Specific gravity 1.020 1.001 - 1.023      pH (UA) 7.0 5.0 - 9.0      Protein GREATER THAN/EQUAL  (A) NEG mg/dL    Glucose 100 (A) NEG mg/dL    Ketone TRACE (A) NEG mg/dL    Bilirubin SMALL (A) NEG      Blood LARGE (A) NEG      Urobilinogen 0.2 0.2 - 1.0 EU/dL    Nitrites Positive (A) NEG      Leukocyte Esterase MODERATE (A) NEG      WBC >100 0 /hpf    RBC >100 0 /hpf    Epithelial cells 3-5 0 /hpf    Bacteria 4+ (H) 0 /hpf    Amorphous Crystals 4+ (H) 0    Other observations RESULTS VERIFIED MANUALLY         All Micro Results     Procedure Component Value Units Date/Time    COVID-19 RAPID TEST [[id number]]     Order Status: Sent     CULTURE, BLOOD [[id number]] Collected: 02/23/22 1558    Order Status: Completed Specimen: Blood Updated: 02/23/22 1630    CULTURE, BLOOD [[id number]] Collected: 02/23/22 1558    Order Status: Completed Specimen: Blood Updated: 02/23/22 1630    CULTURE, URINE [[id number]]     Order Status: Sent Specimen: Cath Urine           Other Studies:  CT ABD/PEL FOR RENAL STONE    Result Date: 2/23/2022  Exam: CT ABD/PEL FOR RENAL STONE on 2/23/2022 4:41 PM Clinical History: The Female patient is 73years old  presenting for Acute renal failure, urinary tract infection, history of bladder cancer and 1 kidney-ureteral stone with hydronephrosis? Bladder outlet obstruction? . Technique: Thin slice axial images were obtained through the abdomen and pelvis without intravenous and without oral contrast.  Coronal reformatted images were also provided for review.  All CT scans at this facility are performed using dose reduction/dose modulation techniques, as appropriate the performed exam, including the following: Automated Exposure Control; Adjustment of the mA and/or kV according to patient size (this includes techniques or standardized protocols for targeted exams where dose is matched to indication/reason for exam); and Use of Iterative Reconstruction Technique. Radiation Exposure Indices: Reference Air Kerma (Ka,r) = 405 mGy-cm Comparison:  Abdomen pelvis CT 11/2/2021. Findings:  Abdomen: Visualized Lung bases: Clear without evidence of focal infiltrate, consolidation, or effusion. Visualized Liver: Normal size, contour, and density without focal mass. Stable 15 mm cystic lesion in right liver lobe. Biliary: Cholecystectomy. No evidence of intra or extrahepatic biliary dilatation. Pancreas: Normal in size and contour without focal lesion. Spleen: Splenomegaly at 12.5 cm. Adrenal glands: Normal in size without focal lesion. Kidneys: Absent left kidney. Moderate hydronephrosis of the right kidney as well as hydroureter to the level of the bladder. Small cyst adjacent the lateral margin of kidney. Bowel: No evidence of obstruction or focal lesion. Normal appendix Retroperitoneum/vasculature: No evidence of significant adenopathy. Unremarkable aorta and IVC. Abdominal soft tissues: Unremarkable. Osseous structures: No acute osseous abnormality. Surgical changes lower lumbar spine. Pelvis: Large diffusely infiltrative bladder mass circumferentially involving the entire bladder. 1. Diffusely infiltrative the bladder mass resulting in moderate right hydronephrosis and hydroureter. 2. Previous left nephrectomy. . CPT code(s): E131850, [id number]       Medications Administered     cefTRIAXone (ROCEPHIN) 1 g in 0.9% sodium chloride (MBP/ADV) 50 mL MBP     Admin Date  02/23/2022 Action  New Bag Dose  1 g Rate  100 mL/hr Route  IntraVENous Administered By  The Og Vines RN          HYDROmorphone (DILAUDID) injection 0.5 mg     Admin Date  02/23/2022 Action  Given Dose  0.5 mg Route  IntraVENous Administered By  Dilip Bella RN          lactated Ringers infusion     Admin Date  02/23/2022 Action  New Bag Dose  150 mL/hr Rate  150 mL/hr Route  IntraVENous Administered By  The Tomi Vines RN          ondansetron [facility]) injection 4 mg     Admin Date  02/23/2022 Action  Given Dose  4 mg Route  IntraVENous Administered By  Dilip Bella RN          sodium chloride (NS) flush 5-10 mL     Admin Date  02/23/2022 Action  Given Dose  10 mL Route  IntraVENous Administered By  Dilip Bella RN                Signed: Keenan Pardo DO    Part of this note may have been written by using a voice dictation software. The note has been proof read but may still contain some grammatical/other typographical errors.

## 2022-02-23 NOTE — ED TRIAGE NOTES
Patient arrives in wheelchair to triage with mask in place. Patient reports she is scheduled to have procedure with urology on Tuesday. Hx bladder cancer. Reports she recently had pre op assessment and was told to come to ER for admission.

## 2022-02-23 NOTE — CONSULTS
[facility]  [place], [address]  [phone]    Bettie Bettencourt  : 1950     HPI   73 y.o., female referred by Dr. Alvin James for bladder cancer, new onset RA and R hydronephrosis in a solitary kidney. Followed by Dr. Gerda Canales for at least T2 poorly differentiated CaB. Radical cystectomy with diversion planned for 3/1. Recent labs showed elevated Cr. Cr in ED show a Cr of 7.30. Unable to visualize orifice at last resection per op note. Reports stable mild pelvic pressure and R flank pain. Denies fevers. Past Medical History:   Diagnosis Date    Cancer [facility]) 2021    bladder     Chronic pain     Back    Heart failure ([facility] [address]     Echo on 2022 showed Reduced left ventricular systolic function with a visually estimated EF of 45 - 50%. Normal diastolic function.  Sleep apnea     can not destinee c pap at hs     Past Surgical History:   Procedure Laterality Date    HX APPENDECTOMY      HX HERNIA REPAIR      HX HYSTERECTOMY      HX LAP CHOLECYSTECTOMY      HX ORTHOPAEDIC      toe transplant--- toe removed from left foot and placed on left hand    NEUROLOGICAL PROCEDURE UNLISTED      back surgery     Current Facility-Administered Medications   Medication Dose Route Frequency Provider Last Rate Last Admin    sodium chloride (NS) flush 5-10 mL  5-10 mL IntraVENous Q8H [place] MD Di   10 mL at 22 1608    sodium chloride (NS) flush 5-10 mL  5-10 mL IntraVENous PRN Antione Etienne MD        lactated Ringers infusion  150 mL/hr IntraVENous CONTINUOUS Antione Etienne  mL/hr at 22 1426 150 mL/hr at 22 1426     Current Outpatient Medications   Medication Sig Dispense Refill    aspirin delayed-release 81 mg tablet Take 81 mg by mouth nightly.        Allergies   Allergen Reactions    Doxycycline Rash     Other reaction(s): Rash-Allergy    Fentanyl Rash     Other reaction(s): Rash-Allergy    Morphine Rash     Other reaction(s): Rash-Allergy     Social History     Socioeconomic History    Marital status:      Spouse name: Not on file    Number of children: Not on file    Years of education: Not on file    Highest education level: Not on file   Occupational History    Not on file   Tobacco Use    Smoking status: Former Smoker     Packs/day: 1.00     Years: 50.00     Pack years: 50.00     Quit date:      Years since quittin.1    Smokeless tobacco: Never Used    Tobacco comment: began smoking age 17   Vaping Use    Vaping Use: Never used   Substance and Sexual Activity    Alcohol use: Never    Drug use: Never    Sexual activity: Not on file   Other Topics Concern    Not on file   Social History Narrative    Not on file     Social Determinants of Health     Financial Resource Strain:     Difficulty of Paying Living Expenses: Not on file   Food Insecurity:     Worried About [email or web address] in the Last Year: Not on file    [address] in the Last Year: Not on file   Transportation Needs:     Lack of Transportation (Medical): Not on file    Lack of Transportation (Non-Medical):  Not on file   Physical Activity:     Days of Exercise per Week: Not on file    Minutes of Exercise per Session: Not on file   Stress:     Feeling of Stress : Not on file   Social Connections:     Frequency of Communication with Friends and Family: Not on file    Frequency of Social Gatherings with Friends and Family: Not on file    Attends Congregational Services: Not on file    Active Member of Clubs or Organizations: Not on file    Attends Club or Organization Meetings: Not on file    Marital Status: Not on file   Intimate Partner Violence:     Fear of Current or Ex-Partner: Not on file    Emotionally Abused: Not on file    Physically Abused: Not on file    Sexually Abused: Not on file   Housing Stability:     Unable to Pay for Housing in the Last Year: Not on file    Number of [place] in the Last Year: Not on file    Unstable Housing in the Last Year: Not on file     Family History   Problem Relation Age of Onset    Cancer Mother         colon    Cancer Father         lung    Cancer Brother         colon with mets to bladder    Cancer Paternal Uncle        Review of Systems  All systems reviewed and are negative at this time. Physical Exam  Visit Vitals  /85 (BP 1 Location: Right arm, BP Patient Position: At rest)   Pulse 89   Temp 97.7 °F (36.5 °C)   Resp 18   Ht 5' 2\" (1.575 m)   Wt 128 lb 6.4 oz (58.2 kg)   SpO2 95%   BMI 23.48 kg/m²     General appearance - alert, well appearing, and in no distress  Mental status - alert and oriented  Eyes - extraocular eye movements intact, sclera anicteric  Nose - normal and patent, no erythema or discharge  Mouth - mucous membranes moist  Chest - clear to auscultation bilaterally  Heart - normal rate, regular rhythm  Abdomen - soft, mild lower abdominal discomfort with deep palpation  Neurological - normal speech, no focal findings or movement disorder noted  Skin - normal coloration and turgor      Assessment/Plan  T2 poorly diff CaB with new onset R hydro/RA. Hospitalist to admit patient. Consult IR.  NPO after MN for R percutaneous nephrostomy placement.     Louie Ram, DO

## 2022-02-23 NOTE — PERIOP NOTES
Recent Results (from the past 24 hour(s))   CBC W/O DIFF    Collection Time: 02/22/22  3:34 PM   Result Value Ref Range    WBC 12.6 (H) 4.3 - 11.1 K/uL    RBC 3.43 (L) 4.05 - 5.2 M/uL    HGB 8.2 (L) 11.7 - 15.4 g/dL    HCT 26.9 (L) 35.8 - 46.3 %    MCV 78.4 (L) 79.6 - 97.8 FL    MCH 23.9 (L) 26.1 - 32.9 PG    MCHC 30.5 (L) 31.4 - 35.0 g/dL    RDW 14.7 (H) 11.9 - 14.6 %    PLATELET 403 915 - 644 K/uL    MPV 9.3 (L) 9.4 - 12.3 FL    ABSOLUTE NRBC 0.00 0.0 - 0.2 K/uL   METABOLIC PANEL, BASIC    Collection Time: 02/22/22  4:26 PM   Result Value Ref Range    Sodium 133 (L) 136 - 145 mmol/L    Potassium 2.6 (L) 3.5 - 5.1 mmol/L    Chloride 100 98 - 107 mmol/L    CO2 22 21 - 32 mmol/L    Anion gap 11 7 - 16 mmol/L    Glucose 88 65 - 100 mg/dL    BUN 60 (H) 8 - 23 MG/DL    Creatinine 6.84 (H) 0.6 - 1.0 MG/DL    GFR est AA 8 (L) >60 ml/min/1.73m2    GFR est non-AA 6 (L) >60 ml/min/1.73m2    Calcium 9.2 8.3 - 10.4 MG/DL     Labs routed and also called to David at Dr. Kaplan [place] office

## 2022-02-24 NOTE — PROGRESS NOTES
Problem: Falls - Risk of  Goal: *Absence of Falls  Description: Document Obiphyllis Kaiserer Fall Risk and appropriate interventions in the flowsheet.   Outcome: Progressing Towards Goal  Note: Fall Risk Interventions:                                Problem: Patient Education: Go to Patient Education Activity  Goal: Patient/Family Education  Outcome: Progressing Towards Goal

## 2022-02-24 NOTE — PROGRESS NOTES
TRANSFER - IN REPORT:    Verbal report received from [facility]  on Raymona Lek  being received from [facility] ED for routine progression of care      Report consisted of patients Situation, Background, Assessment and   Recommendations(SBAR). Information from the following report(s) SBAR and Procedure Summary was reviewed with the receiving nurse. Opportunity for questions and clarification was provided. Assessment will be completed upon patients arrival to unit and care will be assumed.

## 2022-02-24 NOTE — PROGRESS NOTES
completed initial visit with patient, as requested. Patient expressed positive affect and hope in her recovery, using ousmane to cope appropriately. Boyfriend was at bedside and supportive. Boyfriend expressed normal grief which was discussed with .  provided pastoral presence, prayer and empathetic listening. Signed by  Kayla Stinson M.Div.

## 2022-02-24 NOTE — PROGRESS NOTES
Admit Date: 2/23/2022    Subjective:     Nicole Maier is in IR. Objective:     Patient Vitals for the past 8 hrs:   BP Temp Pulse Resp SpO2   02/24/22 1230 (!) 145/73  81  100 %   02/24/22 1225 (!) 155/64  76 16 100 %   02/24/22 1220 (!) 149/66  87 18 100 %   02/24/22 1215 (!) 147/76  83 18 100 %   02/24/22 1210 (!) 141/58  78 22 100 %   02/24/22 1132 136/64 97.8 °F (36.6 °C) 70 16 100 %   02/24/22 1102 (!) 119/58 97.7 °F (36.5 °C) 71 16 100 %   02/24/22 1031 (!) 116/58 97.5 °F (36.4 °C) 73 16 99 %   02/24/22 0736 (!) 113/57 97.5 °F (36.4 °C) 65 16 100 %     No intake/output data recorded. 02/22 1901 - 02/24 0700  In: 48 [P.O.:50]  Out: -         Data Review   Recent Results (from the past 24 hour(s))   URINALYSIS W/ RFLX MICROSCOPIC    Collection Time: 02/23/22  2:10 PM   Result Value Ref Range    Color BROWN      Appearance CLOUDY      Specific gravity 1.020 1.001 - 1.023      pH (UA) 7.0 5.0 - 9.0      Protein GREATER THAN/EQUAL  (A) NEG mg/dL    Glucose 100 (A) NEG mg/dL    Ketone TRACE (A) NEG mg/dL    Bilirubin SMALL (A) NEG      Blood LARGE (A) NEG      Urobilinogen 0.2 0.2 - 1.0 EU/dL    Nitrites Positive (A) NEG      Leukocyte Esterase MODERATE (A) NEG      WBC >100 0 /hpf    RBC >100 0 /hpf    Epithelial cells 3-5 0 /hpf    Bacteria 4+ (H) 0 /hpf    Amorphous Crystals 4+ (H) 0    Other observations RESULTS VERIFIED MANUALLY     CULTURE, URINE    Collection Time: 02/23/22  2:10 PM    Specimen: Cath Urine   Result Value Ref Range    Special Requests: NO SPECIAL REQUESTS      Culture result:        NO GROWTH AFTER SHORT PERIOD OF INCUBATION. FURTHER RESULTS TO FOLLOW AFTER OVERNIGHT INCUBATION.    CULTURE, BLOOD    Collection Time: 02/23/22  3:58 PM    Specimen: Blood   Result Value Ref Range    Special Requests: RIGHT  ARM        Culture result: NO GROWTH AFTER 14 HOURS     CULTURE, BLOOD    Collection Time: 02/23/22  3:58 PM    Specimen: Blood   Result Value Ref Range    Special Requests: LEFT  FOREARM        Culture result: NO GROWTH AFTER 14 HOURS     COVID-19 RAPID TEST    Collection Time: 02/23/22  6:19 PM   Result Value Ref Range    Specimen source NASAL      COVID-19 rapid test Not detected NOTD     FERRITIN    Collection Time: 02/23/22  7:55 PM   Result Value Ref Range    Ferritin 419 (H) 8 - 388 NG/ML   TRANSFERRIN SATURATION    Collection Time: 02/23/22  7:55 PM   Result Value Ref Range    Iron 19 (L) 35 - 150 ug/dL    TIBC 129 (L) 250 - 450 ug/dL    Transferrin Saturation 15 (L) >55 %   METABOLIC PANEL, BASIC    Collection Time: 02/24/22  5:17 AM   Result Value Ref Range    Sodium 136 136 - 145 mmol/L    Potassium 3.0 (L) 3.5 - 5.1 mmol/L    Chloride 103 98 - 107 mmol/L    CO2 23 21 - 32 mmol/L    Anion gap 10 7 - 16 mmol/L    Glucose 88 65 - 100 mg/dL    BUN 74 (H) 8 - 23 MG/DL    Creatinine 7.30 (H) 0.6 - 1.0 MG/DL    GFR est AA 7 (L) >60 ml/min/1.73m2    GFR est non-AA 6 (L) >60 ml/min/1.73m2    Calcium 8.5 8.3 - 10.4 MG/DL   MAGNESIUM    Collection Time: 02/24/22  5:17 AM   Result Value Ref Range    Magnesium 2.3 1.8 - 2.4 mg/dL   CBC W/O DIFF    Collection Time: 02/24/22  5:17 AM   Result Value Ref Range    WBC 7.5 4.3 - 11.1 K/uL    RBC 2.44 (L) 4.05 - 5.2 M/uL    HGB 5.8 (LL) 11.7 - 15.4 g/dL    HCT 18.9 (L) 35.8 - 46.3 %    MCV 77.5 (L) 79.6 - 97.8 FL    MCH 23.8 (L) 26.1 - 32.9 PG    MCHC 30.7 (L) 31.4 - 35.0 g/dL    RDW 14.6 11.9 - 14.6 %    PLATELET 408 989 - 791 K/uL    MPV 9.4 9.4 - 12.3 FL    ABSOLUTE NRBC 0.00 0.0 - 0.2 K/uL   RBC, ALLOCATE    Collection Time: 02/24/22  6:00 AM   Result Value Ref Range    HISTORY CHECKED?  Historical check performed    TYPE & SCREEN    Collection Time: 02/24/22  7:48 AM   Result Value Ref Range    Crossmatch Expiration 02/27/2022,0804     ABO/Rh(D) O NEGATIVE     Antibody screen NEG     Unit number [id number]     Blood component type RC LR,1     Unit division 00     Status of unit ISSUED     Crossmatch result Compatible Assessment:     Principal Problem:    Acute renal failure (ARF) ([facility] (2/23/2022)    Active Problems:    Hematuria (12/30/2021)      Bladder cancer ([address] (12/46/0870)      Systolic CHF, chronic (HCC) (2/2/2022)      Overview: Echo 2019 [place] 45% with no severe valvular pathology      THAO (obstructive sleep apnea) (2/2/2022)      Hypokalemia (2/23/2022)      Acute UTI (2/23/2022)      Kidney congenitally absent, 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 Followed by Dr. Gloria Park for at least T2 poorly differentiated CaB. Radical cystectomy with diversion planned for 3/1. Recent labs showed elevated Cr. Cr in ED show a Cr of 7.30. Unable to visualize orifice at last resection per op note. Reports stable mild pelvic pressure and R flank pain. Denies fevers. S/P R Nephrostomy tube placement today. Hgb 5.8. SP PRBC transfusion. Plan:     Continue NT to drainage. Monitor closely for post obstructive diuresis. Follow labs/output. Transfuse PRBC if hgb <7.0    Plan for scheduled cystectomy next week. She has been marked by stoma team today.           Amparo Levine NP  [facility]

## 2022-02-24 NOTE — PROGRESS NOTES
Rounds performed throughout shift. Pt denies needs at this time. Bed in low position, locked and call light/personal items within reach. Will report to day shift nurse.

## 2022-02-24 NOTE — PROGRESS NOTES
02/23/22 2118   Dual Skin Pressure Injury Assessment   Dual Skin Pressure Injury Assessment WDL   Second Care Provider (Based on Facility Policy) Chrissie GUTIERREZ   Skin Integumentary   Skin Integumentary (WDL) X    Pressure  Injury Documentation No Pressure Injury Noted-Pressure Ulcer Prevention Initiated   Skin Color Appropriate for ethnicity; Ecchymosis (comment)  (LAC area)   Nails X   Skin Condition/Temp Warm;Dry;Fragile   Exceptions to WDL Thick   Skin intact; no pressure injury noted

## 2022-02-24 NOTE — PROGRESS NOTES
ACUTE PHYSICAL THERAPY GOALS:  (Developed with and agreed upon by patient and/or caregiver.)  1. Pt will perform bed mobility (I) without cueing in 7 therapy sessions. 2. Pt will perform all transfers under (S) c use of LRAD in 7 therapy sessions. 3. Pt will ambulate 200 ft SB (A) with use of LRAD and breaks as needed in 7 therapy sessions. 4. Pt will perform standing balance activities with minimal postural sway in 7 therapy sessions. 5. Pt will tolerate multiple sets and reps of BLE exercises in 7 therapy sessions. PHYSICAL THERAPY ASSESSMENT: Initial Assessment, Daily Note and PM PT Treatment Day # 1      Esvin Clemente is a 73 y.o. female   PRIMARY DIAGNOSIS: Acute renal failure (ARF) ([address]  Acute renal failure (ARF) ([facility] [address] [N17.9]       Reason for Referral:    ICD-10: Treatment Diagnosis: Generalized Muscle Weakness (M62.81)  Difficulty in walking, Not elsewhere classified (R26.2)  Other abnormalities of gait and mobility (R26.89)  INPAT[facility]: [facility] / Plan: [facility] ADV / Product Type: Managed Care Medicare /     ASSESSMENT:     REHAB RECOMMENDATIONS:   Recommendation to date pending progress:  Setting:   Short-term Rehab   (High probablilty pt will require STR following surgery)  Equipment:    Rolling Walker     PRIOR LEVEL OF FUNCTION:  (Prior to Hospitalization) INITIAL/CURRENT LEVEL OF FUNCTION:  (Most Recently Demonstrated)   Bed Mobility:   Independent  Sit to Stand:   Independent  Transfers:   Independent  Gait/Mobility:   Independent Bed Mobility:   Standby Assistance  Sit to Stand:  [employer] Assistance  Transfers:   Not tested  Gait/Mobility:  [employer] Assistance     ASSESSMENT:  Ms. Gwendolyn Banuelos Is a 73 y.o F presenting to PT after coming to ED on 2/23 following abnormal lab results at pre-operative assessment. PTA pt lives c her significant other in a single-level hotel in [place]  Pt reports she was (I) for mobility needs at baseline without use of an AD although she stark report receiving (A) from significant other for ADLs/ IADLs. At time of initial evaluation, pt presents below baseline LOF with deficits in strength, standing dynamic balance, gait and activity tolerance limiting her overall functional mobility. Today, pt performed bed mobility with SB (A) while requiring CG (A) for sit-to-stand and ambulation of 8'x1. Of note, pt requiring inc time for all mobility d/t pain and generalized weakness. Pt ambulates c slow and shuffling gait showing narrow BAKARI, dec foot clearance YULY and inc postural sway. Pt only able to ambulate 8'x1 c CG (A) before requesting to turn back to bed d/t notable inc in abd discomfort. Based off of pt performance, it is my professional opinion that pt would benefit from use of RW for added stability. At this time, pt is an appropriate candidate for skilled PT and will benefit from POC designed to address the aforementioned deficits. Upon completion of treatment, pt was positioned to comfort in bed with needs in reach. RN was made aware of pt performance. DC Recommendation: Highly likely pt will require STR following surgery. She is currently below her baseline. SUBJECTIVE:   Ms. Estefania Wilkins states, \"We'd like mer[place] surf n' turf\"    SOCIAL HISTORY/LIVING ENVIRONMENT:  lives c her significant other in a single-level hotel in [place].  (I) for mobility needs at baseline without use of an AD; Reports receiving (A) from significant other for ADLs/ IADLs  Home Environment: Private residence  One/Two Story Residence: One story  Living Alone: No  Support Systems: Spouse/Significant Other  OBJECTIVE:     PAIN: VITAL SIGNS: LINES/DRAINS:   Pre Treatment: Pain Screen  Pain Scale 1: Numeric (0 - 10)  Pain Intensity 1: 5  Pain Location 1: Abdomen  Pain Orientation 1: Anterior  Pain Description 1: Aching  Pain Intervention(s) 1: Ambulation/Increased Activity  Post Treatment: 5 Vital Signs  O2 Sat (%): 99 %  O2 Device: None (Room air) IV  O2 Device: None (Room air)     GROSS EVALUATION:  BLE Within Functional Limits Abnormal/ Functional Abnormal/ Non-Functional (see comments) Not Tested Comments:   AROM [x] [] [] []    PROM [] [] [] []    Strength [] [x] [] [] Globally weaker than baseline    Balance [] [] [x] [] Inc postural sway and narrow BAKARI    Posture [x] [] [] []    Sensation [] [] [] [x]    Coordination [x] [] [] []    Tone [x] [] [] []    Edema [x] [] [] []    Activity Tolerance [] [] [x] [] Well-below baseline level    [] [] [] []      COGNITION/  PERCEPTION: Intact Impaired   (see comments) Comments:   Orientation [x] []    Vision [x] []    Hearing [x] []    Command Following [x] []    Safety Awareness [x] []     [] []      MOBILITY: I Mod I S SBA CGA Min Mod Max Total  NT x2 Comments:   Bed Mobility    Rolling [] [] [] [x] [] [] [] [] [] [] []    Supine to Sit [] [] [] [x] [] [] [] [] [] [] []    Scooting [] [] [] [x] [] [] [] [] [] [] []    Sit to Supine [] [] [] [x] [] [] [] [] [] [] []    Transfers    Sit to Stand [] [] [] [] [x] [] [] [] [] [] []    Bed to Chair [] [] [] [] [] [] [] [] [] [x] []    Stand to Sit [] [] [] [] [x] [] [] [] [] [] []    I=Independent, Mod I=Modified Independent, S=Supervision, SBA=Standby Assistance, CGA=Contact Guard Assistance,   Min=Minimal Assistance, Mod=Moderate Assistance, Max=Maximal Assistance, Total=Total Assistance, NT=Not Tested  GAIT: I Mod I S SBA CGA Min Mod Max Total  NT x2 Comments:   Level of Assistance [] [] [] [] [x] [] [] [] [] [] []    Distance 8'x1    DME minimal-intermittent HHA    Gait Quality slow and shuffling gait showing narrow BAKARI, dec foot clearance YULY and inc postural sway    Weightbearing Status N/A     I=Independent, Mod I=Modified Independent, S=Supervision, SBA=Standby Assistance, CGA=Contact Guard Assistance,   Min=Minimal Assistance, Mod=Moderate Assistance, Max=Maximal Assistance, Total=Total Assistance, NT=Not Tested    [facility] [address] [address] AM-PAC [address] [place] [address] Basic Mobility Inpatient Short Form       How much difficulty does the patient currently have. .. Unable A Lot A Little None   1. Turning over in bed (including adjusting bedclothes, sheets and blankets)? [] 1   [] 2   [] 3   [x] 4   2. Sitting down on and standing up from a chair with arms ( e.g., wheelchair, bedside commode, etc.)   [] 1   [] 2   [] 3   [x] 4   3. Moving from lying on back to sitting on the side of the bed? [] 1   [] 2   [x] 3   [] 4   How much help from another person does the patient currently need. .. Total A Lot A Little None   4. Moving to and from a bed to a chair (including a wheelchair)? [] 1   [] 2   [x] 3   [] 4   5. Need to walk in hospital room? [] 1   [] 2   [x] 3   [] 4   6. Climbing 3-5 steps with a railing? [] 1   [] 2   [x] 3   [] 4   © 2007, Trustees of [address] [place] [address], under license to [employer]. All rights reserved     Score:  Initial: 20 Most Recent: X (Date: -- )    Interpretation of Tool:  Represents activities that are increasingly more difficult (i.e. Bed mobility, Transfers, Gait). PLAN:   FREQUENCY/DURATION: PT Plan of Care: 3 times/week for duration of hospital stay or until stated goals are met, whichever comes first.    PROBLEM LIST:   (Skilled intervention is medically necessary to address:)  1. Decreased ADL/Functional Activities  2. Decreased Activity Tolerance  3. Decreased Balance  4. Decreased Gait Ability  5. Decreased Strength  6. Increased Pain   INTERVENTIONS PLANNED:   (Benefits and precautions of physical therapy have been discussed with the patient.)  1. Therapeutic Activity  2. Therapeutic Exercise/HEP  3. Neuromuscular Re-education  4. Gait Training  5. Manual Therapy  6. Education     TREATMENT:     EVALUATION: Low Complexity : (Untimed Charge)    TREATMENT:   ($$ Therapeutic Activity: 8-22 mins    )  Therapeutic Activity (10 Minutes):  Therapeutic activity included Supine to Sit, Sit to Supine, Scooting, Lateral Scooting, Ambulation on level ground, Sitting balance  and Standing balance to improve functional Mobility, Strength and Activity tolerance.     TREATMENT GRID:  N/A    AFTER TREATMENT POSITION/PRECAUTIONS:  Bed, Needs within reach, RN notified and Visitors at bedside    INTERDISCIPLINARY COLLABORATION:  RN/PCT and PT/PTA    TOTAL TREATMENT DURATION:  PT Patient Time In/Time Out  Time In: Ronnie 52  Time Out: Didier 69, PT

## 2022-02-24 NOTE — PROGRESS NOTES
CM met with patient to discuss discharge planning. Patient's significant other Nancy Fierro [phone] present at bedside. Patient presented alert and oriented. Demographics verified. Patient and her significant other are currently staying at an [facility] [email or web address] [phone] in [place]. Patient reports she requires assistance with completing ADL's. Nancy Fierro provides assistance with care needs. The patient does not have reliable transportation. Patient's friend provides assistance with transportation. Patient denies any DME Use. Patient obtains prescription medications from [facility] in [place]. Patient denies any difficulty with obtaining medications in the community. Discharge planning: PT/OT has been consulted. Patient denies any history of [facility] services and rehab. Therapy evaluation pending. CM will continue to follow plan of care, to assist further with discharge planning. Care Management Interventions  PCP Verified by CM: Yes  Mode of Transport at Discharge: Other (see comment) (TBD: based upon need. )  Transition of Care Consult (CM Consult): Discharge Planning  Physical Therapy Consult: Yes  Occupational Therapy Consult: Yes  Support Systems: Spouse/Significant Other  Confirm Follow Up Transport: Friends  The Plan for Transition of Care is Related to the Following Treatment Goals : Return to prior level of functioning. The Patient and/or Patient Representative was Provided with a Choice of Provider and Agrees with the Discharge Plan?: Yes  Name of the Patient Representative Who was Provided with a Choice of Provider and Agrees with the Discharge Plan: Patient.    Resource Information Provided?: No  Discharge Location  Patient Expects to be Discharged to[de-identified] Unable to determine at this time

## 2022-02-24 NOTE — PROGRESS NOTES
TRANSFER - OUT REPORT:    Verbal report given to Dyan Rossi RN on Sofy Sebastian  being transferred to IR prep room 5 for routine post - op       Report consisted of patients Situation, Background, Assessment and   Recommendations(SBAR). Information from the following report(s) SBAR, Kardex, Procedure Summary and MAR was reviewed with the receiving nurse. Lines:   Peripheral IV 59/25/19 Right Basilic (Active)   Site Assessment Clean, dry, & intact 02/24/22 0405   Phlebitis Assessment 0 02/24/22 0405   Infiltration Assessment 0 02/24/22 0405   Dressing Status Clean, dry, & intact 02/24/22 0405   Dressing Type Tape;Transparent 02/24/22 0405   Hub Color/Line Status Patent; Infusing 02/24/22 0405       Peripheral IV 02/24/22 Anterior;Distal;Right Forearm (Active)        Opportunity for questions and clarification was provided.

## 2022-02-24 NOTE — ED NOTES
TRANSFER - OUT REPORT:    Verbal report given to summer rn(name) on Priyank Reynolds  being transferred to 629(unit) for routine progression of care       Report consisted of patients Situation, Background, Assessment and   Recommendations(SBAR). Information from the following report(s) ED Summary was reviewed with the receiving nurse. Lines:   Peripheral IV 02/23/22 Anterior;Left;Proximal Forearm (Active)       Peripheral IV 49/51/36 Right Basilic (Active)        Opportunity for questions and clarification was provided.       Patient transported with:   O2 @ room air liters

## 2022-02-24 NOTE — PROCEDURES
Department of Interventional Radiology  [phone]        Interventional Radiology Brief Procedure Note    Patient: Esvin Clemente MRN: [id number]  SSN: [id number]    YOB: 1950  Age: 73 y.o. Sex: female      Date of Procedure: 2/24/2022    Pre-Procedure Diagnosis: hydronephrosis    Post-Procedure Diagnosis: SAME    Procedure(s): Percutaneous Nephrostomy Tube Placement    Brief Description of Procedure: as above    Performed By: Jamar Brandon MD     Assistants: None    Anesthesia:Moderate Sedation    Estimated Blood Loss: Less than 10ml    Specimens:  None    Implants:  Nephrostomy Drain    Findings: moderate hydro.   10 F drain placed    Complications: None    Recommendations: external drainage     Follow Up: 3 months unless otherwise directed by Urology    Signed By: Jamar Brandon MD     February 24, 2022

## 2022-02-24 NOTE — WOUND CARE
Received message from Michelle Barone with urology. Patient admitted and pre op teaching and stoma site marking for ileal conduit requested. Patient surgery scheduled for next Tuesday. Has a live in boyfriend that patient reports will be willing to learn as well. Marked RLQ, patient instructed to continue to darken taz if it lightens with showers at home. Home skills kit given. Shown anatomy and surgery pictures. Hands on with pouch in kit. She is in agreement to watch DVD at home and practice on stoma model. Answered all questions. Possible dc home tomorrow per NP, going to IR today for procedure. May have blood transfusion today.

## 2022-02-24 NOTE — PROGRESS NOTES
TRANSFER - OUT REPORT:    Verbal report given to Phil Rowe RN on Geovanni Ibrahim  being transferred to 6th floor for routine post - op       Report consisted of patients Situation, Background, Assessment and   Recommendations(SBAR). Information from the following report(s) SBAR, Kardex, Procedure Summary and MAR was reviewed with the receiving nurse. Lines:   Peripheral IV 77/04/79 Right Basilic (Active)   Site Assessment Clean, dry, & intact 02/24/22 0405   Phlebitis Assessment 0 02/24/22 0405   Infiltration Assessment 0 02/24/22 0405   Dressing Status Clean, dry, & intact 02/24/22 0405   Dressing Type Tape;Transparent 02/24/22 0405   Hub Color/Line Status Patent; Infusing 02/24/22 0405       Peripheral IV 02/24/22 Anterior;Distal;Right Forearm (Active)        Opportunity for questions and clarification was provided. 3 month follow up appointment to be made.

## 2022-02-24 NOTE — PROGRESS NOTES
OT orders received, chart reviewed, and evaluation attempted. Hold d/t critically low Hgb 5.8. Will follow up as schedule/ pt's status allows.     Ian Wheeler, OT

## 2022-02-24 NOTE — PROGRESS NOTES
Physical Therapy Note:    Orders received, chart review and eval attempted. Pt off-floor at time of attempt. Will try back at later time/ date pending pt status and availability.      Thank Terrie Jones PT, DPT, CSCS

## 2022-02-24 NOTE — PROGRESS NOTES
Hospitalist Progress Note   Admit Date:  2022  1:09 PM   Name:  Bulmaro Serrato   Age:  73 y.o. Sex:  female  :  1950   MRN:  [id number]   Room:  [facility]/    Presenting Complaint: Abdominal Pain    Reason(s) for Admission: Acute renal failure (ARF) [facility]) [N17.9]     Hospital Course & Interval History:   Mrs. 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 She was recently diagnosed with a bladder tumor and underwent cystoscopy with TURBT in December which showed squamous cell carcinoma with extensive necrosis. She had repeat TURBT 21 for debulking. She had recently opted to proceed with radical cystectomy and ileal conduit. She was referred to Cardiology for perioperative risk assessment and routine labs showed hypokalemia and RA so she was referred to the hospital where labs showed K 3.1 and sCr 7.3. She has largely been asymptomatic aside from abdominal pain that has been chronic. Does appear to have had some oliguria. CT a/p showed R sided hydronephrosis and hydroureter with extrinsic compression from her bladder tumor. Urology consulted who recommended admission and IR consult for percutaneous nephrostomy tube placement. She was started on abx for acute cystitis. Hb on the morning of  dropped from 7.9 to 5.8 though no clinical bleeding noted. Subjective/24hr Events (22): Hb dropped to 5.8 from 7.9 but patient denies any bleeding, had brown BM yesterday so otherwise no clinical bleeding noted. sCr stable at 7.3. Still with persistent generalized abdominal pain (not new). No nausea or vomiting, no chest pain or SOB. ROS:  10 systems reviewed and negative except as noted above. Assessment & Plan:   # RA, post-obstructive   - Due to extrinsic compression from bladder tumor causing R sided hydronephrosis/ureter. Appreciate Urology input. Cr stable today.  IR consult for PCN tube, though drop in Hb may delay it? # Iron deficiency anemia   - Baseline Hb ~8 since January, today dropped to 5.8 though no clinical bleeding noted. Transfuse to get >7. No hematoma or bleeding was noted on CT at admission. Empiric PPI added, monitor for bleeding. # HypoK   - Replace again cautiously    # Acute uncomplicated cystitis/pyuria   - Rocephin. F/u culture. # Squamous cell carcinoma of the bladder   - S/p TURBT x2, opting for outpatient radical cystectomy. # H/o tobacco abuse   - Quit ~20 years ago    Dispo/Discharge Planning: Likely home when able.   Diet:  DIET NPO  DVT PPx: SCDs  Code status: Full Code    Hospital Problems as of 2/24/2022 Date Reviewed: 2/22/2022          Codes Class Noted - Resolved POA    * (Principal) Acute renal failure (ARF) ([facility] ICD-10-CM: N17.9  ICD-9-CM: 584.9  2/23/2022 - Present Unknown        Hypokalemia ICD-10-CM: E87.6  ICD-9-CM: 276.8  2/23/2022 - Present Unknown        Acute UTI ICD-10-CM: N39.0  ICD-9-CM: 599.0  2/23/2022 - Present Unknown        Kidney congenitally absent, left ICD-10-CM: Q60.0  ICD-9-CM: 753.0  2/23/2022 - Present Unknown        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 ([facility] ICD-10-CM: N13.30, C67.9  ICD-9-CM: 591, 188.9  2/23/2022 - Present Unknown        Systolic CHF, chronic (HCC) ICD-10-CM: I50.22  ICD-9-CM: 428.22, 428.0  2/2/2022 - Present Yes    Overview Signed 2/2/2022  8:06 AM by Thong Greenberg MD     Echo 2019 [place] 45% with no severe valvular pathology             THAO (obstructive sleep apnea) ICD-10-CM: G47.33  ICD-9-CM: 327.23  2/2/2022 - Present Yes        Hematuria ICD-10-CM: R31.9  ICD-9-CM: 599.70  12/30/2021 - Present Yes        Bladder cancer ([facility] ICD-10-CM: C67.9  ICD-9-CM: 188.9  12/30/2021 - Present Yes              Objective:     Patient Vitals for the past 24 hrs:   Temp Pulse Resp BP SpO2   02/24/22 0736 97.5 °F (36.4 °C) 65 16 (!) 113/57 100 %   02/24/22 0413    (!) 108/57    02/24/22 0405 97.4 °F (36.3 °C) 62 16 (!) 98/53 95 %   02/24/22 0033 97.5 °F (36.4 °C) 76 18 (!) 104/56 98 %   02/23/22 2118 97.4 °F (36.3 °C) 76 16 125/67 98 %   02/23/22 2030 98.3 °F (36.8 °C) 88  117/69 98 %   02/23/22 2014 98.7 °F (37.1 °C) 88  (!) 118/102 98 %   02/23/22 2010     98 %   02/23/22 1852     100 %   02/23/22 1842    120/79    02/23/22 1802    126/79 96 %   02/23/22 1244 97.7 °F (36.5 °C) 89 18 130/85 95 %     Oxygen Therapy  O2 Sat (%): 100 % (02/24/22 0736)  Pulse via Oximetry: 79 beats per minute (02/23/22 1852)  O2 Device: None (Room air) (02/23/22 2030)    Estimated body mass index is 23.28 kg/m² as calculated from the following:    Height as of this encounter: 5' 2\" (1.575 m). Weight as of this encounter: 57.7 kg (127 lb 4.8 oz). Intake/Output Summary (Last 24 hours) at 2/24/2022 1010  Last data filed at 2/23/2022 2013  Gross per 24 hour   Intake 50 ml   Output    Net 50 ml         Physical Exam:   Blood pressure (!) 113/57, pulse 65, temperature 97.5 °F (36.4 °C), resp. rate 16, height 5' 2\" (1.575 m), weight 57.7 kg (127 lb 4.8 oz), SpO2 100 %. General:    Well nourished. No overt distress. Appears older than stated age. Head:  Normocephalic, atraumatic  Eyes:  Sclerae appear normal.  Pupils equally round. ENT:  Nares appear normal, no drainage. Moist oral mucosa  Neck:  No restricted ROM. Trachea midline   CV:   RRR. No m/r/g. No jugular venous distension. Lungs:   CTAB. No wheezing, rhonchi, or rales. Respirations even, unlabored  Abdomen: Bowel sounds present. Soft, nondistended. Mild generalized abdominal tenderness to palpation, no obvious rashes or bruising. Extremities: No cyanosis or clubbing. No edema  Skin:     No rashes and normal coloration. Warm and dry. Neuro:  CN II-XII grossly intact. Sensation intact. A&Ox3  Psych:  Normal mood and affect.       I have reviewed ordered lab tests and independently visualized imaging below:    Recent Labs:  Recent Results (from the past 48 hour(s))   CBC W/O DIFF    Collection Time: 02/22/22  3:34 PM   Result Value Ref Range    WBC 12.6 (H) 4.3 - 11.1 K/uL    RBC 3.43 (L) 4.05 - 5.2 M/uL    HGB 8.2 (L) 11.7 - 15.4 g/dL    HCT 26.9 (L) 35.8 - 46.3 %    MCV 78.4 (L) 79.6 - 97.8 FL    MCH 23.9 (L) 26.1 - 32.9 PG    MCHC 30.5 (L) 31.4 - 35.0 g/dL    RDW 14.7 (H) 11.9 - 14.6 %    PLATELET 027 926 - 738 K/uL    MPV 9.3 (L) 9.4 - 12.3 FL    ABSOLUTE NRBC 0.00 0.0 - 0.2 K/uL   METABOLIC PANEL, BASIC    Collection Time: 02/22/22  4:26 PM   Result Value Ref Range    Sodium 133 (L) 136 - 145 mmol/L    Potassium 2.6 (L) 3.5 - 5.1 mmol/L    Chloride 100 98 - 107 mmol/L    CO2 22 21 - 32 mmol/L    Anion gap 11 7 - 16 mmol/L    Glucose 88 65 - 100 mg/dL    BUN 60 (H) 8 - 23 MG/DL    Creatinine 6.84 (H) 0.6 - 1.0 MG/DL    GFR est AA 8 (L) >60 ml/min/1.73m2    GFR est non-AA 6 (L) >60 ml/min/1.73m2    Calcium 9.2 8.3 - 10.4 MG/DL   CBC WITH AUTOMATED DIFF    Collection Time: 02/23/22 12:50 PM   Result Value Ref Range    WBC 10.1 4.3 - 11.1 K/uL    RBC 3.35 (L) 4.05 - 5.2 M/uL    HGB 7.9 (L) 11.7 - 15.4 g/dL    HCT 27.3 (L) 35.8 - 46.3 %    MCV 81.5 79.6 - 97.8 FL    MCH 23.6 (L) 26.1 - 32.9 PG    MCHC 28.9 (L) 31.4 - 35.0 g/dL    RDW 15.5 (H) 11.9 - 14.6 %    PLATELET 264 527 - 405 K/uL    MPV 9.4 9.4 - 12.3 FL    ABSOLUTE NRBC 0.00 0.0 - 0.2 K/uL    DF AUTOMATED      NEUTROPHILS 84 (H) 43 - 78 %    LYMPHOCYTES 10 (L) 13 - 44 %    MONOCYTES 5 4.0 - 12.0 %    EOSINOPHILS 1 0.5 - 7.8 %    BASOPHILS 0 0.0 - 2.0 %    IMMATURE GRANULOCYTES 1 0.0 - 5.0 %    ABS. NEUTROPHILS 8.5 (H) 1.7 - 8.2 K/UL    ABS. LYMPHOCYTES 1.0 0.5 - 4.6 K/UL    ABS. MONOCYTES 0.5 0.1 - 1.3 K/UL    ABS. EOSINOPHILS 0.1 0.0 - 0.8 K/UL    ABS. BASOPHILS 0.0 0.0 - 0.2 K/UL    ABS. IMM.  GRANS. 0.1 0.0 - 0.5 K/UL   METABOLIC PANEL, COMPREHENSIVE    Collection Time: 02/23/22 12:50 PM   Result Value Ref Range    Sodium 137 136 - 145 mmol/L    Potassium 3.1 (L) 3.5 - 5.1 mmol/L Chloride 100 98 - 107 mmol/L    CO2 23 21 - 32 mmol/L    Anion gap 14 7 - 16 mmol/L    Glucose 104 (H) 65 - 100 mg/dL    BUN 66 (H) 8 - 23 MG/DL    Creatinine 7.30 (H) 0.6 - 1.0 MG/DL    GFR est AA 7 (L) >60 ml/min/1.73m2    GFR est non-AA 6 (L) >60 ml/min/1.73m2    Calcium 8.9 8.3 - 10.4 MG/DL    Bilirubin, total 0.3 0.2 - 1.1 MG/DL    ALT (SGPT) 7 (L) 12 - 65 U/L    AST (SGOT) 12 (L) 15 - 37 U/L    Alk. phosphatase 65 50 - 136 U/L    Protein, total 6.7 6.3 - 8.2 g/dL    Albumin 2.3 (L) 3.2 - 4.6 g/dL    Globulin 4.4 (H) 2.3 - 3.5 g/dL    A-G Ratio 0.5 (L) 1.2 - 3.5     LIPASE    Collection Time: 02/23/22 12:50 PM   Result Value Ref Range    Lipase 120 73 - 393 U/L   URINALYSIS W/ RFLX MICROSCOPIC    Collection Time: 02/23/22  2:10 PM   Result Value Ref Range    Color BROWN      Appearance CLOUDY      Specific gravity 1.020 1.001 - 1.023      pH (UA) 7.0 5.0 - 9.0      Protein GREATER THAN/EQUAL  (A) NEG mg/dL    Glucose 100 (A) NEG mg/dL    Ketone TRACE (A) NEG mg/dL    Bilirubin SMALL (A) NEG      Blood LARGE (A) NEG      Urobilinogen 0.2 0.2 - 1.0 EU/dL    Nitrites Positive (A) NEG      Leukocyte Esterase MODERATE (A) NEG      WBC >100 0 /hpf    RBC >100 0 /hpf    Epithelial cells 3-5 0 /hpf    Bacteria 4+ (H) 0 /hpf    Amorphous Crystals 4+ (H) 0    Other observations RESULTS VERIFIED MANUALLY     CULTURE, URINE    Collection Time: 02/23/22  2:10 PM    Specimen: Cath Urine   Result Value Ref Range    Special Requests: NO SPECIAL REQUESTS      Culture result:        NO GROWTH AFTER SHORT PERIOD OF INCUBATION. FURTHER RESULTS TO FOLLOW AFTER OVERNIGHT INCUBATION.    CULTURE, BLOOD    Collection Time: 02/23/22  3:58 PM    Specimen: Blood   Result Value Ref Range    Special Requests: RIGHT  ARM        Culture result: NO GROWTH AFTER 14 HOURS     CULTURE, BLOOD    Collection Time: 02/23/22  3:58 PM    Specimen: Blood   Result Value Ref Range    Special Requests: LEFT  FOREARM        Culture result: NO GROWTH AFTER 14 HOURS     COVID-19 RAPID TEST    Collection Time: 02/23/22  6:19 PM   Result Value Ref Range    Specimen source NASAL      COVID-19 rapid test Not detected NOTD     FERRITIN    Collection Time: 02/23/22  7:55 PM   Result Value Ref Range    Ferritin 419 (H) 8 - 388 NG/ML   TRANSFERRIN SATURATION    Collection Time: 02/23/22  7:55 PM   Result Value Ref Range    Iron 19 (L) 35 - 150 ug/dL    TIBC 129 (L) 250 - 450 ug/dL    Transferrin Saturation 15 (L) >16 %   METABOLIC PANEL, BASIC    Collection Time: 02/24/22  5:17 AM   Result Value Ref Range    Sodium 136 136 - 145 mmol/L    Potassium 3.0 (L) 3.5 - 5.1 mmol/L    Chloride 103 98 - 107 mmol/L    CO2 23 21 - 32 mmol/L    Anion gap 10 7 - 16 mmol/L    Glucose 88 65 - 100 mg/dL    BUN 74 (H) 8 - 23 MG/DL    Creatinine 7.30 (H) 0.6 - 1.0 MG/DL    GFR est AA 7 (L) >60 ml/min/1.73m2    GFR est non-AA 6 (L) >60 ml/min/1.73m2    Calcium 8.5 8.3 - 10.4 MG/DL   MAGNESIUM    Collection Time: 02/24/22  5:17 AM   Result Value Ref Range    Magnesium 2.3 1.8 - 2.4 mg/dL   CBC W/O DIFF    Collection Time: 02/24/22  5:17 AM   Result Value Ref Range    WBC 7.5 4.3 - 11.1 K/uL    RBC 2.44 (L) 4.05 - 5.2 M/uL    HGB 5.8 (LL) 11.7 - 15.4 g/dL    HCT 18.9 (L) 35.8 - 46.3 %    MCV 77.5 (L) 79.6 - 97.8 FL    MCH 23.8 (L) 26.1 - 32.9 PG    MCHC 30.7 (L) 31.4 - 35.0 g/dL    RDW 14.6 11.9 - 14.6 %    PLATELET 537 461 - 744 K/uL    MPV 9.4 9.4 - 12.3 FL    ABSOLUTE NRBC 0.00 0.0 - 0.2 K/uL   RBC, ALLOCATE    Collection Time: 02/24/22  6:00 AM   Result Value Ref Range    HISTORY CHECKED?  Historical check performed    TYPE & SCREEN    Collection Time: 02/24/22  7:48 AM   Result Value Ref Range    Crossmatch Expiration 02/27/2022,235     ABO/Rh(D) O NEGATIVE     Antibody screen NEG     Unit number [id number]     Blood component type RC LR,1     Unit division 00     Status of unit ALLOCATED     Crossmatch result Compatible        All Micro Results     Procedure Component Value Units Date/Time    CULTURE, BLOOD [[id number]] Collected: 02/23/22 1558    Order Status: Completed Specimen: Blood Updated: 02/24/22 0730     Special Requests: --        RIGHT  ARM       Culture result: NO GROWTH AFTER 14 HOURS       CULTURE, BLOOD [[id number]] Collected: 02/23/22 1558    Order Status: Completed Specimen: Blood Updated: 02/24/22 0730     Special Requests: --        LEFT  FOREARM       Culture result: NO GROWTH AFTER 14 HOURS       CULTURE, URINE [[id number]] Collected: 02/23/22 1410    Order Status: Completed Specimen: Cath Urine Updated: 02/24/22 7202     Special Requests: NO SPECIAL REQUESTS        Culture result:       NO GROWTH AFTER SHORT PERIOD OF INCUBATION. FURTHER RESULTS TO FOLLOW AFTER OVERNIGHT INCUBATION. COVID-19 RAPID TEST [[id number]] Collected: 02/23/22 1819    Order Status: Completed Specimen: Nasopharyngeal Updated: 02/23/22 1931     Specimen source NASAL        COVID-19 rapid test Not detected        Comment:      The specimen is NEGATIVE for SARS-CoV-2, the novel coronavirus associated with COVID-19. A negative result does not rule out COVID-19. This test has been authorized by the FDA under an Emergency Use Authorization (EUA) for use by authorized laboratories. Fact sheet for Healthcare Providers: ConventionUpdate.co.nz  Fact sheet for Patients: ConventionUpdate.co.nz       Methodology: Isothermal Nucleic Acid Amplification               Other Studies:  CT ABD/PEL FOR RENAL STONE    Result Date: 2/23/2022  Exam: CT ABD/PEL FOR RENAL STONE on 2/23/2022 4:41 PM Clinical History: The Female patient is 73years old  presenting for Acute renal failure, urinary tract infection, history of bladder cancer and 1 kidney-ureteral stone with hydronephrosis? Bladder outlet obstruction? . Technique:   Thin slice axial images were obtained through the abdomen and pelvis without intravenous and without oral contrast.  Coronal reformatted images were also provided for review. All CT scans at this facility are performed using dose reduction/dose modulation techniques, as appropriate the performed exam, including the following: Automated Exposure Control; Adjustment of the mA and/or kV according to patient size (this includes techniques or standardized protocols for targeted exams where dose is matched to indication/reason for exam); and Use of Iterative Reconstruction Technique. Radiation Exposure Indices: Reference Air Kerma (Ka,r) = 405 mGy-cm Comparison:  Abdomen pelvis CT 11/2/2021. Findings:  Abdomen: Visualized Lung bases: Clear without evidence of focal infiltrate, consolidation, or effusion. Visualized Liver: Normal size, contour, and density without focal mass. Stable 15 mm cystic lesion in right liver lobe. Biliary: Cholecystectomy. No evidence of intra or extrahepatic biliary dilatation. Pancreas: Normal in size and contour without focal lesion. Spleen: Splenomegaly at 12.5 cm. Adrenal glands: Normal in size without focal lesion. Kidneys: Absent left kidney. Moderate hydronephrosis of the right kidney as well as hydroureter to the level of the bladder. Small cyst adjacent the lateral margin of kidney. Bowel: No evidence of obstruction or focal lesion. Normal appendix Retroperitoneum/vasculature: No evidence of significant adenopathy. Unremarkable aorta and IVC. Abdominal soft tissues: Unremarkable. Osseous structures: No acute osseous abnormality. Surgical changes lower lumbar spine. Pelvis: Large diffusely infiltrative bladder mass circumferentially involving the entire bladder. 1. Diffusely infiltrative the bladder mass resulting in moderate right hydronephrosis and hydroureter. 2. Previous left nephrectomy. . CPT code(s): M814175, A7550510       Current Meds:  Current Facility-Administered Medications   Medication Dose Route Frequency    0.9% sodium chloride infusion 250 mL  250 mL IntraVENous PRN    potassium chloride (K-DUR, KLOR-CON M20) SR tablet 20 mEq  20 mEq Oral BID    pantoprazole (PROTONIX) 40 mg in 0.9% sodium chloride 10 mL injection  40 mg IntraVENous Q12H    sodium chloride (NS) flush 5-10 mL  5-10 mL IntraVENous Q8H    sodium chloride (NS) flush 5-10 mL  5-10 mL IntraVENous PRN    lactated Ringers infusion  75 mL/hr IntraVENous CONTINUOUS    sodium chloride (NS) flush 5-40 mL  5-40 mL IntraVENous Q8H    sodium chloride (NS) flush 5-40 mL  5-40 mL IntraVENous PRN    acetaminophen (TYLENOL) tablet 650 mg  650 mg Oral Q6H PRN    Or    acetaminophen (TYLENOL) suppository 650 mg  650 mg Rectal Q6H PRN    polyethylene glycol (MIRALAX) packet 17 g  17 g Oral DAILY PRN    ondansetron (ZOFRAN ODT) tablet 4 mg  4 mg Oral Q8H PRN    Or    ondansetron (ZOFRAN) injection 4 mg  4 mg IntraVENous Q6H PRN    cefTRIAXone (ROCEPHIN) 1 g in 0.9% sodium chloride (MBP/ADV) 50 mL MBP  1 g IntraVENous Q24H       Signed:  Dm Wayne MD    Part of this note may have been written by using a voice dictation software. The note has been proof read but may still contain some grammatical/other typographical errors.

## 2022-02-25 NOTE — PROGRESS NOTES
Chart reviewed by EVANS MARSH for discharge planning. Patient to remain inpatient, with planned procedure radical cystectomy on Tuesday 3/1. Patient has been recommended for STR. Patient is unsure if she would like to obtain REHAB at discharge. Patient has requested EVANS MARSH follow up after procedure, to confirm disposition. PPD to be ordered for rehab potential.    CM following plan of care. Disposition: STR vs HH.

## 2022-02-25 NOTE — PROGRESS NOTES
ACUTE OT GOALS:  (Developed with and agreed upon by patient and/or caregiver.)  1. Pt will toilet with CGA   2. Pt will complete functional mobility for ADLs with CGA using AD as needed  3. Pt will complete lower body dressing with CGA using AE as needed  4. Pt will complete grooming and hygiene at sink with CGA  5. Pt will demonstrate independence with HEP to promote increased BUE strength and functional use for ADLs      Timeframe: 7 days      OCCUPATIONAL THERAPY ASSESSMENT: Initial Assessment and Daily Note OT Treatment Day # 1    Faustino Lovelace is a 73 y.o. female   PRIMARY DIAGNOSIS: Acute renal failure (ARF) ([facility]  Acute renal failure (ARF) ([facility] [N17.9]       Reason for Referral:  Generalized weakness, ARF  ICD-10: Treatment Diagnosis: Generalized Muscle Weakness (M62.81)  INPATIENT: Payor: [place] / [facility] [place] [facility] / Product Type: Managed Care Medicare /   ASSESSMENT:     REHAB RECOMMENDATIONS:   Recommendation to date pending progress:  Setting:   Short-term Rehab  Equipment:    None     PRIOR LEVEL OF FUNCTION:  (Prior to Hospitalization)  INITIAL/CURRENT LEVEL OF FUNCTION:  (Based on today's evaluation)   Bathing:   Minimal Assistance  Dressing:   Minimal Assistance  Feeding/Grooming:   Independent  Toileting:   Standby Assistance  Functional Mobility:   Contact Guard Assistance Bathing:   Minimal Assistance  Dressing:   Minimal Assistance  Feeding/Grooming:   Independent  Toileting:   Minimal Assistance  Functional Mobility:   Minimal Assistance     ASSESSMENT:  Ms. Lizzeth Chavez was admitted with ARF, hydronephrosis in one functioning kidney, has a hx of recently diagnosed bladder cancer. Pt presented generally weak with deficits in endurance, strength, mobility, and balance impacting ADLs. Pt required CGA-min A for ADLs and mobility for ADLs using RW. Pt endorsed pain and fatigue with light activity.  Pt is below her functional baseline and would benefit from skilled OT services to address deficits. SUBJECTIVE:   Ms. Grady Stoll states, \"I want to get stronger. \"    SOCIAL HISTORY/LIVING ENVIRONMENT: Stays in a hotel w/ sig other. Normally independent, has been requiring assistance for bathing, dressing, toileting, and for hair as well as for mobility d/t onset of illness. Tub shower.    Home Environment: Private residence  One/Two Story Residence: One story  Living Alone: No  Support Systems: Spouse/Significant Other    OBJECTIVE:     PAIN: VITAL SIGNS: LINES/DRAINS:   Pre Treatment: Pain Screen  Pain Scale 1: Numeric (0 - 10)  Pain Intensity 1: 5  Pain Location 1: Flank  Pain Intervention(s) 1: Repositioned  Post Treatment: 5   IV and nephrostomy  O2 Device: None (Room air)     GROSS EVALUATION:  BUE Within Functional Limits Abnormal/ Functional Abnormal/ Non-Functional (see comments) Not Tested Comments:   AROM [] [x] [] [] Decreased in L hand d/t abnormality    PROM [] [] [] []    Strength [] [x] [] []    Balance [] [x] [] []    Posture [] [] [] []    Sensation [] [] [] []    Coordination [] [x] [] []    Tone [] [] [] []    Edema [] [] [] []    Activity Tolerance [] [x] [] []     [] [] [] []      COGNITION/  PERCEPTION: Intact Impaired   (see comments) Comments:   Orientation [x] []    Vision [x] []    Hearing [x] []    Judgment/ Insight [x] []    Attention [x] []    Memory [x] []    Command Following [x] []    Emotional Regulation [x] []     [] []      ACTIVITIES OF DAILY LIVING: I Mod I S SBA CGA Min Mod Max Total NT Comments   BASIC ADLs:              Bathing/ Showering [] [] [] [] [] [] [] [] [] []    Toileting [] [] [] [] [] [] [] [] [] []    Dressing [] [] [] [] [] [x] [] [] [] []    Feeding [] [] [] [] [] [] [] [] [] []    Grooming [] [] [] [] [] [] [] [] [] []    Personal Device Care [] [] [] [] [] [] [] [] [] []    Functional Mobility [] [] [] [] [] [x] [] [] [] [] RW   I=Independent, Mod I=Modified Independent, S=Supervision, SBA=Standby Assistance, CGA=Contact Guard Assistance,   Min=Minimal Assistance, Mod=Moderate Assistance, Max=Maximal Assistance, Total=Total Assistance, NT=Not Tested    MOBILITY: I Mod I S SBA CGA Min Mod Max Total  NT x2 Comments:   Supine to sit [] [] [] [] [x] [] [] [] [] [] []    Sit to supine [] [] [] [] [] [] [] [] [] [] []    Sit to stand [] [] [] [] [] [x] [] [] [] [] []    Bed to chair [] [] [] [] [] [x] [] [] [] [] []    I=Independent, Mod I=Modified Independent, S=Supervision, SBA=Standby Assistance, CGA=Contact Guard Assistance,   Min=Minimal Assistance, Mod=Moderate Assistance, Max=Maximal Assistance, Total=Total Assistance, NT=Not [facility]-PAC 6 Clicks   Daily Activity Inpatient Short Form        How much help from another person does the patient currently need. .. Total A Lot A Little None   1. Putting on and taking off regular lower body clothing? [] 1   [] 2   [x] 3   [] 4   2. Bathing (including washing, rinsing, drying)? [] 1   [] 2   [x] 3   [] 4   3. Toileting, which includes using toilet, bedpan or urinal?   [] 1   [] 2   [x] 3   [] 4   4. Putting on and taking off regular upper body clothing? [] 1   [] 2   [x] 3   [] 4   5. Taking care of personal grooming such as brushing teeth? [] 1   [] 2   [x] 3   [] 4   6. Eating meals? [] 1   [] 2   [] 3   [x] 4   © 2007, Trustees of [address] [place] [address] [address] [id number], under license to [employer]. All rights reserved     Score:  Initial: 19 Most Recent: X (Date: -- )   Interpretation of Tool:  Represents activities that are increasingly more difficult (i.e. Bed mobility, Transfers, Gait). PLAN:   FREQUENCY/DURATION: OT Plan of Care: 3 times/week for duration of hospital stay or until stated goals are met, whichever comes first.    PROBLEM LIST:   (Skilled intervention is medically necessary to address:)  1. Decreased ADL/Functional Activities  2. Decreased Activity Tolerance  3. Decreased Balance  4. Decreased Strength  5.  Decreased Transfer Abilities  6. Increased Pain   INTERVENTIONS PLANNED:   (Benefits and precautions of occupational therapy have been discussed with the patient.)  1. Self Care Training  2. Therapeutic Activity  3. Therapeutic Exercise/HEP  4. Neuromuscular Re-education  5. Education     TREATMENT:     EVALUATION: Moderate Complexity : (Untimed Charge)    TREATMENT:   ($$ Self Care/Home Management: 8-22 mins    )  Self Care (8 Minutes): Self care including Lower Body Dressing and Energy Conservation Training to increase independence and decrease level of assistance required.     TREATMENT GRID:  N/A    AFTER TREATMENT POSITION/PRECAUTIONS:  Chair, Needs within reach, RN notified and Visitors at bedside    INTERDISCIPLINARY COLLABORATION:  RN/PCT    TOTAL TREATMENT DURATION:  OT Patient Time In/Time Out  Time In: 0912  Time Out: Javon Edmond OT

## 2022-02-25 NOTE — PROGRESS NOTES
Problem: Falls - Risk of  Goal: *Absence of Falls  Description: Document Aisha Bennett Fall Risk and appropriate interventions in the flowsheet.   Outcome: Progressing Towards Goal  Note: Fall Risk Interventions:  Mobility Interventions: Patient to call before getting OOB              Elimination Interventions: Call light in reach              Problem: Patient Education: Go to Patient Education Activity  Goal: Patient/Family Education  Outcome: Progressing Towards Goal     Problem: Patient Education: Go to Patient Education Activity  Goal: Patient/Family Education  Outcome: Progressing Towards Goal     Problem: Acute Renal Failure: Day 1  Goal: Off Pathway (Use only if patient is Off Pathway)  Outcome: Progressing Towards Goal  Goal: Activity/Safety  Outcome: Progressing Towards Goal  Goal: Consults, if ordered  Outcome: Progressing Towards Goal  Goal: Diagnostic Test/Procedures  Outcome: Progressing Towards Goal  Goal: Nutrition/Diet  Outcome: Progressing Towards Goal  Goal: Discharge Planning  Outcome: Progressing Towards Goal  Goal: Medications  Outcome: Progressing Towards Goal  Goal: Respiratory  Outcome: Progressing Towards Goal  Goal: Treatments/Interventions/Procedures  Outcome: Progressing Towards Goal  Goal: Psychosocial  Outcome: Progressing Towards Goal  Goal: *Optimal pain control at patient's stated goal  Outcome: Progressing Towards Goal  Goal: *Urinary output within identified parameters  Outcome: Progressing Towards Goal  Goal: *Hemodynamically stable  Outcome: Progressing Towards Goal  Goal: *Tolerating diet  Outcome: Progressing Towards Goal     Problem: Acute Renal Failure: Day 2  Goal: Off Pathway (Use only if patient is Off Pathway)  Outcome: Progressing Towards Goal  Goal: Activity/Safety  Outcome: Progressing Towards Goal  Goal: Consults, if ordered  Outcome: Progressing Towards Goal  Goal: Diagnostic Test/Procedures  Outcome: Progressing Towards Goal  Goal: Nutrition/Diet  Outcome: Progressing Towards Goal  Goal: Discharge Planning  Outcome: Progressing Towards Goal  Goal: Medications  Outcome: Progressing Towards Goal  Goal: Respiratory  Outcome: Progressing Towards Goal  Goal: Treatments/Interventions/Procedures  Outcome: Progressing Towards Goal  Goal: Psychosocial  Outcome: Progressing Towards Goal  Goal: *Optimal pain control at patient's stated goal  Outcome: Progressing Towards Goal  Goal: *Urinary output within identified parameters  Outcome: Progressing Towards Goal  Goal: *Hemodynamically stable  Outcome: Progressing Towards Goal  Goal: *Tolerating diet  Outcome: Progressing Towards Goal  Goal: *Lab values improving  Outcome: Progressing Towards Goal     Problem: Acute Renal Failure: Day 3  Goal: Off Pathway (Use only if patient is Off Pathway)  Outcome: Progressing Towards Goal  Goal: Activity/Safety  Outcome: Progressing Towards Goal  Goal: Consults, if ordered  Outcome: Progressing Towards Goal  Goal: Diagnostic Test/Procedures  Outcome: Progressing Towards Goal  Goal: Nutrition/Diet  Outcome: Progressing Towards Goal  Goal: Discharge Planning  Outcome: Progressing Towards Goal  Goal: Medications  Outcome: Progressing Towards Goal  Goal: Respiratory  Outcome: Progressing Towards Goal  Goal: Treatments/Interventions/Procedures  Outcome: Progressing Towards Goal  Goal: Psychosocial  Outcome: Progressing Towards Goal  Goal: *Optimal pain control at patient's stated goal  Outcome: Progressing Towards Goal  Goal: *Urinary output within identified parameters  Outcome: Progressing Towards Goal  Goal: *Hemodynamically stable  Outcome: Progressing Towards Goal  Goal: *Tolerating diet  Outcome: Progressing Towards Goal  Goal: *Lab values improving  Outcome: Progressing Towards Goal     Problem: Acute Renal Failure: Day 4  Goal: Off Pathway (Use only if patient is Off Pathway)  Outcome: Progressing Towards Goal  Goal: Activity/Safety  Outcome: Progressing Towards Goal  Goal: Consults, if ordered  Outcome: Progressing Towards Goal  Goal: Diagnostic Test/Procedures  Outcome: Progressing Towards Goal  Goal: Nutrition/Diet  Outcome: Progressing Towards Goal  Goal: Discharge Planning  Outcome: Progressing Towards Goal  Goal: Medications  Outcome: Progressing Towards Goal  Goal: Respiratory  Outcome: Progressing Towards Goal  Goal: Treatments/Interventions/Procedures  Outcome: Progressing Towards Goal  Goal: Psychosocial  Outcome: Progressing Towards Goal  Goal: *Optimal pain control at patient's stated goal  Outcome: Progressing Towards Goal  Goal: *Urinary output within identified parameters  Outcome: Progressing Towards Goal  Goal: *Hemodynamically stable  Outcome: Progressing Towards Goal  Goal: *Tolerating diet  Outcome: Progressing Towards Goal  Goal: *Lab values improving  Outcome: Progressing Towards Goal     Problem: Acute Renal Failure: Day 5  Goal: Off Pathway (Use only if patient is Off Pathway)  Outcome: Progressing Towards Goal  Goal: Activity/Safety  Outcome: Progressing Towards Goal  Goal: Diagnostic Test/Procedures  Outcome: Progressing Towards Goal  Goal: Nutrition/Diet  Outcome: Progressing Towards Goal  Goal: Discharge Planning  Outcome: Progressing Towards Goal  Goal: Medications  Outcome: Progressing Towards Goal  Goal: Respiratory  Outcome: Progressing Towards Goal  Goal: Treatments/Interventions/Procedures  Outcome: Progressing Towards Goal  Goal: Psychosocial  Outcome: Progressing Towards Goal  Goal: *Optimal pain control at patient's stated goal  Outcome: Progressing Towards Goal  Goal: *Urinary output within identified parameters  Outcome: Progressing Towards Goal  Goal: *Hemodynamically stable  Outcome: Progressing Towards Goal  Goal: *Tolerating diet  Outcome: Progressing Towards Goal  Goal: *Lab values improving  Outcome: Progressing Towards Goal     Problem: Acute Renal Failure: Day 6  Goal: Off Pathway (Use only if patient is Off Pathway)  Outcome: Progressing Towards Goal  Goal: Activity/Safety  Outcome: Progressing Towards Goal  Goal: Diagnostic Test/Procedures  Outcome: Progressing Towards Goal  Goal: Nutrition/Diet  Outcome: Progressing Towards Goal  Goal: Discharge Planning  Outcome: Progressing Towards Goal  Goal: Medications  Outcome: Progressing Towards Goal  Goal: Respiratory  Outcome: Progressing Towards Goal  Goal: Treatments/Interventions/Procedures  Outcome: Progressing Towards Goal  Goal: Psychosocial  Outcome: Progressing Towards Goal     Problem: Acute Renal Failure: Discharge Outcomes  Goal: *Optimal pain control at patient's stated goal  Outcome: Progressing Towards Goal  Goal: *Urinary output within identified parameters  Outcome: Progressing Towards Goal  Goal: *Hemodynamically stable  Outcome: Progressing Towards Goal  Goal: *Tolerating diet  Outcome: Progressing Towards Goal  Goal: *Lab values stabilized  Outcome: Progressing Towards Goal  Goal: *Verbalizes understanding and describes medication purposes and frequencies  Outcome: Progressing Towards Goal  Goal: *Medication reconciliation  Outcome: Progressing Towards Goal

## 2022-02-25 NOTE — PROGRESS NOTES
Patient sitting up in chair with boyfriend at side. States feels much improved today. Will most likely remain inpatient until planned [facility] Urology surgery on 3-1-22. Provided IS. Patient demonstrated proper use (1000 ml total volume). Instructed to use IS 10 times every hour. Patient states started drinking her [employer] shakes Tuesday after PAT (consumed 3 prior to hospital admission). Instructed to continue drinking 2 bottles per day today through Sunday, Feb. 27. Patient verbalized understanding.

## 2022-02-25 NOTE — PROGRESS NOTES
Hospitalist Progress Note   Admit Date:  2022  1:09 PM   Name:  Lauren Chapman   Age:  73 y.o. Sex:  female  :  1950   MRN:  [id number]   Room:  [facility]/    Presenting Complaint: Abdominal Pain    Reason(s) for Admission: Acute renal failure (ARF) [facility]) [N17.9]     Hospital Course & Interval History:   Mrs. 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 She was recently diagnosed with a bladder tumor and underwent cystoscopy with TURBT in December which showed squamous cell carcinoma with extensive necrosis. She had repeat TURBT 21 for debulking. She had recently opted to proceed with radical cystectomy and ileal conduit. She was referred to Cardiology for perioperative risk assessment and routine labs showed hypokalemia and RA so she was referred to the hospital where labs showed K 3.1 and sCr 7.3. She has largely been asymptomatic aside from abdominal pain that has been chronic. Did have oliguria. CT a/p showed R sided hydronephrosis and hydroureter with extrinsic compression from her bladder tumor. Urology consulted who recommended admission and IR consult for percutaneous nephrostomy tube placement. She was started on abx for acute cystitis. Hb on the morning of  dropped from 7.9 to 5.8 though no clinical bleeding noted. Perc nephrostomy placed  with good urine output. Subjective/24hr Events (22):  C/o some R sided pain where tube is, however otherwise says she feels much better. Her appetite has improved, some slight improvement in abdominal discomfort. Energy level is better. Cr improved, good urine output. No chest pain or SOB. ROS:  10 systems reviewed and negative except as noted above. Assessment & Plan:   # RA, post-obstructive              - Due to extrinsic compression from bladder tumor causing R sided hydronephrosis/ureter. Urology following with h/o bladder cancer. Percutaneous nephrostomy placed 2/24 with good uop. Cr improved to 6.2 with >3L urine output documented past 24 hours.     # Iron deficiency anemia              - Baseline Hb ~8 since January, dropped to 5.8 yesterday but no clinical bleeding noted, transfused 1U RBCs. No hematoma or bleeding was noted on CT at admission. # Constipation   - Schedule miralax     # HypoK              - Resolved. Monitor.     # Acute uncomplicated cystitis/pyuria              - Con't Rocephin. Culture neg to date.     # Squamous cell carcinoma of the bladder              - S/p TURBT x2, opting for outpatient radical cystectomy.     # H/o tobacco abuse              - Quit ~20 years ago     Dispo/Discharge Planning: Hopefully home when able. Will stay here for planned radical cystectomy on 3/1.   Diet:  ADULT DIET Regular; Low Potassium (Less than 3000 mg/day)  DVT PPx: SCDs  Code status: Full Code    Hospital Problems as of 2/25/2022 Date Reviewed: 2/22/2022          Codes Class Noted - Resolved POA    * (Principal) Acute renal failure (ARF) ([facility] ICD-10-CM: N17.9  ICD-9-CM: 584.9  2/23/2022 - Present Unknown        Hypokalemia ICD-10-CM: E87.6  ICD-9-CM: 276.8  2/23/2022 - Present Unknown        Acute UTI ICD-10-CM: N39.0  ICD-9-CM: 599.0  2/23/2022 - Present Unknown        Kidney congenitally absent, left ICD-10-CM: Q60.0  ICD-9-CM: 753.0  2/23/2022 - Present Unknown        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 ([facility] ICD-10-CM: N13.30, C67.9  ICD-9-CM: 591, 188.9  2/23/2022 - Present Unknown        Systolic CHF, chronic (HCC) ICD-10-CM: I50.22  ICD-9-CM: 428.22, 428.0  2/2/2022 - Present Yes    Overview Signed 2/2/2022  8:06 AM by Maico Carmen MD     Echo 2019 [place] 45% with no severe valvular pathology             THAO (obstructive sleep apnea) ICD-10-CM: G47.33  ICD-9-CM: 327.23  2/2/2022 - Present Yes        Hematuria ICD-10-CM: R31.9  ICD-9-CM: 599.70  12/30/2021 - Present Yes        Bladder cancer [facility]) ICD-10-CM: C67.9  ICD-9-CM: 188.9  12/30/2021 - Present Yes              Objective:     Patient Vitals for the past 24 hrs:   Temp Pulse Resp BP SpO2   02/25/22 0746 97.3 °F (36.3 °C) 73 18 124/60 98 %   02/25/22 0425 97.5 °F (36.4 °C) 71 22 125/66 96 %   02/24/22 2341 98 °F (36.7 °C) 81 16 116/65 97 %   02/24/22 1945 97.8 °F (36.6 °C) 73 20 (!) 106/57 95 %   02/24/22 1457     99 %   02/24/22 1321  73 16 127/66 100 %   02/24/22 1312  76 16 135/69 100 %   02/24/22 1302  63 16 128/63 100 %   02/24/22 1252  79 16 (!) 138/55 99 %   02/24/22 1242  80 16 136/61    02/24/22 1230  81 16 (!) 145/73 100 %   02/24/22 1225  76 16 (!) 155/64 100 %   02/24/22 1220  87 18 (!) 149/66 100 %   02/24/22 1215  83 18 (!) 147/76 100 %   02/24/22 1210  78 22 (!) 141/58 100 %   02/24/22 1132 97.8 °F (36.6 °C) 70 16 136/64 100 %   02/24/22 1102 97.7 °F (36.5 °C) 71 16 (!) 119/58 100 %   02/24/22 1031 97.5 °F (36.4 °C) 73 16 (!) 116/58 99 %     Oxygen Therapy  O2 Sat (%): 98 % (02/25/22 0746)  Pulse via Oximetry: 73 beats per minute (02/24/22 1321)  O2 Device: None (Room air) (02/24/22 1457)  O2 Flow Rate (L/min): 3 l/min (02/24/22 1302)  ETCO2 (mmHg): 22 mmHg (02/24/22 1230)    Estimated body mass index is 20.58 kg/m² as calculated from the following:    Height as of this encounter: 5' 2\" (1.575 m). Weight as of this encounter: 51 kg (112 lb 8 oz). Intake/Output Summary (Last 24 hours) at 2/25/2022 0839  Last data filed at 2/25/2022 1314  Gross per 24 hour   Intake 325 ml   Output 3100 ml   Net -2775 ml         Physical Exam:   Blood pressure 124/60, pulse 73, temperature 97.3 °F (36.3 °C), resp. rate 18, height 5' 2\" (1.575 m), weight 51 kg (112 lb 8 oz), SpO2 98 %. General:    Well nourished. No overt distress. Pleasant, in good spirits. Head:  Normocephalic, atraumatic  Eyes:  Sclerae appear normal.  Pupils equally round. ENT:  Nares appear normal, no drainage. Moist oral mucosa. Poor dentition.   Neck:  No restricted ROM. Trachea midline   CV:   RRR. No m/r/g. No jugular venous distension. Lungs:   CTAB. No wheezing, rhonchi, or rales. Respirations even, unlabored  Abdomen: Bowel sounds present. Soft, nontender, nondistended. :  R sided percutaneous nephrostomy tube with about 450cc urine in drain bag. Extremities: No cyanosis or clubbing. No edema. Skin:     No rashes and normal coloration. Warm and dry. Neuro:  CN II-XII grossly intact. Sensation intact. A&Ox3  Psych:  Normal mood and affect. I have reviewed ordered lab tests and independently visualized imaging below:    Recent Labs:  Recent Results (from the past 48 hour(s))   CBC WITH AUTOMATED DIFF    Collection Time: 02/23/22 12:50 PM   Result Value Ref Range    WBC 10.1 4.3 - 11.1 K/uL    RBC 3.35 (L) 4.05 - 5.2 M/uL    HGB 7.9 (L) 11.7 - 15.4 g/dL    HCT 27.3 (L) 35.8 - 46.3 %    MCV 81.5 79.6 - 97.8 FL    MCH 23.6 (L) 26.1 - 32.9 PG    MCHC 28.9 (L) 31.4 - 35.0 g/dL    RDW 15.5 (H) 11.9 - 14.6 %    PLATELET 541 784 - 926 K/uL    MPV 9.4 9.4 - 12.3 FL    ABSOLUTE NRBC 0.00 0.0 - 0.2 K/uL    DF AUTOMATED      NEUTROPHILS 84 (H) 43 - 78 %    LYMPHOCYTES 10 (L) 13 - 44 %    MONOCYTES 5 4.0 - 12.0 %    EOSINOPHILS 1 0.5 - 7.8 %    BASOPHILS 0 0.0 - 2.0 %    IMMATURE GRANULOCYTES 1 0.0 - 5.0 %    ABS. NEUTROPHILS 8.5 (H) 1.7 - 8.2 K/UL    ABS. LYMPHOCYTES 1.0 0.5 - 4.6 K/UL    ABS. MONOCYTES 0.5 0.1 - 1.3 K/UL    ABS. EOSINOPHILS 0.1 0.0 - 0.8 K/UL    ABS. BASOPHILS 0.0 0.0 - 0.2 K/UL    ABS. IMM.  GRANS. 0.1 0.0 - 0.5 K/UL   METABOLIC PANEL, COMPREHENSIVE    Collection Time: 02/23/22 12:50 PM   Result Value Ref Range    Sodium 137 136 - 145 mmol/L    Potassium 3.1 (L) 3.5 - 5.1 mmol/L    Chloride 100 98 - 107 mmol/L    CO2 23 21 - 32 mmol/L    Anion gap 14 7 - 16 mmol/L    Glucose 104 (H) 65 - 100 mg/dL    BUN 66 (H) 8 - 23 MG/DL    Creatinine 7.30 (H) 0.6 - 1.0 MG/DL    GFR est AA 7 (L) >60 ml/min/1.73m2    GFR est non-AA 6 (L) >60 ml/min/1.73m2    Calcium 8.9 8.3 - 10.4 MG/DL    Bilirubin, total 0.3 0.2 - 1.1 MG/DL    ALT (SGPT) 7 (L) 12 - 65 U/L    AST (SGOT) 12 (L) 15 - 37 U/L    Alk.  phosphatase 65 50 - 136 U/L    Protein, total 6.7 6.3 - 8.2 g/dL    Albumin 2.3 (L) 3.2 - 4.6 g/dL    Globulin 4.4 (H) 2.3 - 3.5 g/dL    A-G Ratio 0.5 (L) 1.2 - 3.5     LIPASE    Collection Time: 02/23/22 12:50 PM   Result Value Ref Range    Lipase 120 73 - 393 U/L   URINALYSIS W/ RFLX MICROSCOPIC    Collection Time: 02/23/22  2:10 PM   Result Value Ref Range    Color BROWN      Appearance CLOUDY      Specific gravity 1.020 1.001 - 1.023      pH (UA) 7.0 5.0 - 9.0      Protein GREATER THAN/EQUAL  (A) NEG mg/dL    Glucose 100 (A) NEG mg/dL    Ketone TRACE (A) NEG mg/dL    Bilirubin SMALL (A) NEG      Blood LARGE (A) NEG      Urobilinogen 0.2 0.2 - 1.0 EU/dL    Nitrites Positive (A) NEG      Leukocyte Esterase MODERATE (A) NEG      WBC >100 0 /hpf    RBC >100 0 /hpf    Epithelial cells 3-5 0 /hpf    Bacteria 4+ (H) 0 /hpf    Amorphous Crystals 4+ (H) 0    Other observations RESULTS VERIFIED MANUALLY     CULTURE, URINE    Collection Time: 02/23/22  2:10 PM    Specimen: Cath Urine   Result Value Ref Range    Special Requests: NO SPECIAL REQUESTS      Culture result: ORGANISM IN STUDY     CULTURE, BLOOD    Collection Time: 02/23/22  3:58 PM    Specimen: Blood   Result Value Ref Range    Special Requests: RIGHT  ARM        Culture result: NO GROWTH 2 DAYS     CULTURE, BLOOD    Collection Time: 02/23/22  3:58 PM    Specimen: Blood   Result Value Ref Range    Special Requests: LEFT  FOREARM        Culture result: NO GROWTH 2 DAYS     COVID-19 RAPID TEST    Collection Time: 02/23/22  6:19 PM   Result Value Ref Range    Specimen source NASAL      COVID-19 rapid test Not detected NOTD     FERRITIN    Collection Time: 02/23/22  7:55 PM   Result Value Ref Range    Ferritin 419 (H) 8 - 388 NG/ML   TRANSFERRIN SATURATION    Collection Time: 02/23/22  7:55 PM   Result Value Ref Range    Iron 19 (L) 35 - 150 ug/dL    TIBC 129 (L) 250 - 450 ug/dL    Transferrin Saturation 15 (L) >86 %   METABOLIC PANEL, BASIC    Collection Time: 02/24/22  5:17 AM   Result Value Ref Range    Sodium 136 136 - 145 mmol/L    Potassium 3.0 (L) 3.5 - 5.1 mmol/L    Chloride 103 98 - 107 mmol/L    CO2 23 21 - 32 mmol/L    Anion gap 10 7 - 16 mmol/L    Glucose 88 65 - 100 mg/dL    BUN 74 (H) 8 - 23 MG/DL    Creatinine 7.30 (H) 0.6 - 1.0 MG/DL    GFR est AA 7 (L) >60 ml/min/1.73m2    GFR est non-AA 6 (L) >60 ml/min/1.73m2    Calcium 8.5 8.3 - 10.4 MG/DL   MAGNESIUM    Collection Time: 02/24/22  5:17 AM   Result Value Ref Range    Magnesium 2.3 1.8 - 2.4 mg/dL   CBC W/O DIFF    Collection Time: 02/24/22  5:17 AM   Result Value Ref Range    WBC 7.5 4.3 - 11.1 K/uL    RBC 2.44 (L) 4.05 - 5.2 M/uL    HGB 5.8 (LL) 11.7 - 15.4 g/dL    HCT 18.9 (L) 35.8 - 46.3 %    MCV 77.5 (L) 79.6 - 97.8 FL    MCH 23.8 (L) 26.1 - 32.9 PG    MCHC 30.7 (L) 31.4 - 35.0 g/dL    RDW 14.6 11.9 - 14.6 %    PLATELET 981 582 - 610 K/uL    MPV 9.4 9.4 - 12.3 FL    ABSOLUTE NRBC 0.00 0.0 - 0.2 K/uL   RBC, ALLOCATE    Collection Time: 02/24/22  6:00 AM   Result Value Ref Range    HISTORY CHECKED?  Historical check performed    TYPE & SCREEN    Collection Time: 02/24/22  7:48 AM   Result Value Ref Range    Crossmatch Expiration 02/27/2022,2359     ABO/Rh(D) O NEGATIVE     Antibody screen NEG     Unit number [id number]     Blood component type RC LR,1     Unit division 00     Status of unit TRANSFUSED     Crossmatch result Compatible    HGB & HCT    Collection Time: 02/24/22  2:19 PM   Result Value Ref Range    HGB 9.2 (L) 11.7 - 15.4 g/dL    HCT 29.5 (L) 35.8 - 78.4 %   METABOLIC PANEL, BASIC    Collection Time: 02/25/22  5:58 AM   Result Value Ref Range    Sodium 140 136 - 145 mmol/L    Potassium 3.8 3.5 - 5.1 mmol/L    Chloride 107 98 - 107 mmol/L    CO2 23 21 - 32 mmol/L    Anion gap 10 7 - 16 mmol/L    Glucose 89 65 - 100 mg/dL    BUN 67 (H) 8 - 23 MG/DL    Creatinine 6.20 (H) 0.6 - 1.0 MG/DL    GFR est AA 9 (L) >60 ml/min/1.73m2    GFR est non-AA 7 (L) >60 ml/min/1.73m2    Calcium 8.8 8.3 - 10.4 MG/DL   MAGNESIUM    Collection Time: 02/25/22  5:58 AM   Result Value Ref Range    Magnesium 2.1 1.8 - 2.4 mg/dL   CBC W/O DIFF    Collection Time: 02/25/22  5:58 AM   Result Value Ref Range    WBC 9.4 4.3 - 11.1 K/uL    RBC 3.03 (L) 4.05 - 5.2 M/uL    HGB 7.6 (L) 11.7 - 15.4 g/dL    HCT 24.1 (L) 35.8 - 46.3 %    MCV 79.5 (L) 79.6 - 97.8 FL    MCH 25.1 (L) 26.1 - 32.9 PG    MCHC 31.5 31.4 - 35.0 g/dL    RDW 15.6 (H) 11.9 - 14.6 %    PLATELET 928 821 - 259 K/uL    MPV 9.4 9.4 - 12.3 FL    ABSOLUTE NRBC 0.00 0.0 - 0.2 K/uL       All Micro Results     Procedure Component Value Units Date/Time    CULTURE, URINE [[id number]] Collected: 02/23/22 1410    Order Status: Completed Specimen: Cath Urine Updated: 02/25/22 0832     Special Requests: NO SPECIAL REQUESTS        Culture result: ORGANISM IN STUDY       CULTURE, BLOOD [[id number]] Collected: 02/23/22 1558    Order Status: Completed Specimen: Blood Updated: 02/25/22 0654     Special Requests: --        LEFT  FOREARM       Culture result: NO GROWTH 2 DAYS       CULTURE, BLOOD [[id number]] Collected: 02/23/22 1558    Order Status: Completed Specimen: Blood Updated: 02/25/22 0654     Special Requests: --        RIGHT  ARM       Culture result: NO GROWTH 2 DAYS       COVID-19 RAPID TEST [[id number]] Collected: 02/23/22 1819    Order Status: Completed Specimen: Nasopharyngeal Updated: 02/23/22 1931     Specimen source NASAL        COVID-19 rapid test Not detected        Comment:      The specimen is NEGATIVE for SARS-CoV-2, the novel coronavirus associated with COVID-19. A negative result does not rule out COVID-19. This test has been authorized by the FDA under an Emergency Use Authorization (EUA) for use by authorized laboratories.         Fact sheet for Healthcare Providers: ConventionUpdate.co.nz  Fact sheet for Patients: ConventionUpdate.co.nz       Methodology: Isothermal Nucleic Acid Amplification               Other Studies:  IR NEPHROSTOMY PERC RT PLC CATH  SI    Result Date: 2/24/2022  Title: Percutaneous nephrostomy Placement. Indication: Solitary kidney. Bladder cancer. Increased hydronephrosis. Consent: Informed written and oral consent was obtained from the patient after explanation of benefits and risks (including, but not limited to: infection, hemorrhage, visceral injury). The patient's questions were answered to her satisfaction. She stated understanding and requested that we proceed. Procedure:  Maximal sterile barrier technique (including:  cap, mask, sterile gown, sterile gloves, sterile sheet, hand hygiene, and chlorhexidene for cutaneous antisepsis) were used. With the patient prone the skin of the right flank was prepped and draped in the standard fashion. Ultrasound evaluation was performed. .  An appropriate skin location was chosen and anesthetized with lidocaine. Using ultrasound guidance, a needle was advanced into the mid calyx. An antegrade nephrostogram was performed and permanent radiographic images were obtained. An 018 [place] wire was passed into the renal collecting system. Using an [place], a   wire was placed. Over a wire, an 10 [place] nephrostomy was positioned into the renal pelvis. Contrast was administered confirming appropriate catheter position. This catheter was then secured. Specimen:  None Complication:  None. Radiation Exposure Indices: Reference Air Kerma (Ka,r) = 10 mGy Dose Area Product/Kerma Area Product (DAP/VERO/PKA) = 163 cGy-cm2 Fluoroscopy Exposure Time = 54 seconds Contrast:  6 milliliters. Medications:  Under physician supervision,intraservice time of  10 minutes of moderate intravenous conscious sedation was performed by administering Versed, Dilaudid intravenously.  Continuous pulse oximetry, heart rate, and blood pressure monitoring was performed with an independent, trained observer present. Findings: Moderate hydronephrosis and dilated ureter     Uncomplicated right percutaneous nephrostomy placement. Plan: Patient has been transported to their hospital room. Patient will return in 3 months for routine exchange of less otherwise indicated by urology. Current Meds:  Current Facility-Administered Medications   Medication Dose Route Frequency    0.9% sodium chloride infusion 250 mL  250 mL IntraVENous PRN    potassium chloride (K-DUR, KLOR-CON M20) SR tablet 20 mEq  20 mEq Oral BID    pantoprazole (PROTONIX) 40 mg in 0.9% sodium chloride 10 mL injection  40 mg IntraVENous Q12H    sodium chloride (NS) flush 5-10 mL  5-10 mL IntraVENous Q8H    sodium chloride (NS) flush 5-10 mL  5-10 mL IntraVENous PRN    lactated [place] infusion  75 mL/hr IntraVENous CONTINUOUS    sodium chloride (NS) flush 5-40 mL  5-40 mL IntraVENous Q8H    sodium chloride (NS) flush 5-40 mL  5-40 mL IntraVENous PRN    acetaminophen (TYLENOL) tablet 650 mg  650 mg Oral Q6H PRN    Or    acetaminophen (TYLENOL) suppository 650 mg  650 mg Rectal Q6H PRN    polyethylene glycol (MIRALAX) packet 17 g  17 g Oral DAILY PRN    ondansetron (ZOFRAN ODT) tablet 4 mg  4 mg Oral Q8H PRN    Or    ondansetron (ZOFRAN) injection 4 mg  4 mg IntraVENous Q6H PRN    cefTRIAXone (ROCEPHIN) 1 g in 0.9% sodium chloride (MBP/ADV) 50 mL MBP  1 g IntraVENous Q24H       Signed:  Jason Orourke MD    Part of this note may have been written by using a voice dictation software. The note has been proof read but may still contain some grammatical/other typographical errors.

## 2022-02-25 NOTE — PROGRESS NOTES
Urology Progress Note    Admit Date: 2/23/2022    Subjective:     Patient has no new complaints. Perc tube in place and now with postobstructive diuresis; electrolytes OK. Objective:     Patient Vitals for the past 8 hrs:   BP Temp Pulse Resp SpO2 Weight   02/25/22 0425 125/66 97.5 °F (36.4 °C) 71 22 96 % 112 lb 8 oz (51 kg)   02/24/22 2341 116/65 98 °F (36.7 °C) 81 16 97 %      No intake/output data recorded. 02/23 1901 - 02/25 0700  In: 375 [P.O.:50]  Out: 3100 [Urine:3100]    Physical Exam:     Awake, alert, and oriented  Lungs no JVD. Breathing is  non-labored; no audible wheezing.     Heart regular, normal perfusion  Abd soft, nt, nd  UOP clear in perc tube      Data Review   Recent Results (from the past 24 hour(s))   TYPE & SCREEN    Collection Time: 02/24/22  7:48 AM   Result Value Ref Range    Crossmatch Expiration 02/27/2022,2359     ABO/Rh(D) O NEGATIVE     Antibody screen NEG     Unit number [id number]     Blood component type RC LR,1     Unit division 00     Status of unit TRANSFUSED     Crossmatch result Compatible    HGB & HCT    Collection Time: 02/24/22  2:19 PM   Result Value Ref Range    HGB 9.2 (L) 11.7 - 15.4 g/dL    HCT 29.5 (L) 35.8 - 50.3 %   METABOLIC PANEL, BASIC    Collection Time: 02/25/22  5:58 AM   Result Value Ref Range    Sodium 140 136 - 145 mmol/L    Potassium 3.8 3.5 - 5.1 mmol/L    Chloride 107 98 - 107 mmol/L    CO2 23 21 - 32 mmol/L    Anion gap 10 7 - 16 mmol/L    Glucose 89 65 - 100 mg/dL    BUN 67 (H) 8 - 23 MG/DL    Creatinine 6.20 (H) 0.6 - 1.0 MG/DL    GFR est AA 9 (L) >60 ml/min/1.73m2    GFR est non-AA 7 (L) >60 ml/min/1.73m2    Calcium 8.8 8.3 - 10.4 MG/DL   MAGNESIUM    Collection Time: 02/25/22  5:58 AM   Result Value Ref Range    Magnesium 2.1 1.8 - 2.4 mg/dL   CBC W/O DIFF    Collection Time: 02/25/22  5:58 AM   Result Value Ref Range    WBC 9.4 4.3 - 11.1 K/uL    RBC 3.03 (L) 4.05 - 5.2 M/uL    HGB 7.6 (L) 11.7 - 15.4 g/dL    HCT 24.1 (L) 35.8 - 46.3 % MCV 79.5 (L) 79.6 - 97.8 FL    MCH 25.1 (L) 26.1 - 32.9 PG    MCHC 31.5 31.4 - 35.0 g/dL    RDW 15.6 (H) 11.9 - 14.6 %    PLATELET 501 992 - 092 K/uL    MPV 9.4 9.4 - 12.3 FL    ABSOLUTE NRBC 0.00 0.0 - 0.2 K/uL           Assessment:     Principal Problem:    Acute renal failure (ARF) ([address] (2/23/2022)    Active Problems:    Hematuria (12/30/2021)      Bladder cancer ([address] (40/41/4617)      Systolic CHF, chronic (HCC) (2/2/2022)      Overview: Echo 2019 [place] 45% with no severe valvular pathology      THAO (obstructive sleep apnea) (2/2/2022)      Hypokalemia (2/23/2022)      Acute UTI (2/23/2022)      Kidney congenitally absent, 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      Plan:     -monitor BMP bid for next 1-2 days to ensure lytes stable with diuresis  -pls transfuse pt to HCT > 30 as pending cystectomy on Tuesday will be significant risk for acute blood loss  -again discussed plan and risks of radical cystectomy with pt; she wants to proceed  -consult UMA nurse today  -appreciate hospitalist team care      Will J Carlos Matthews MD    [facility] Urology  [facility]

## 2022-02-25 NOTE — PROGRESS NOTES
Rounds performed throughout shift. Pt denies needs at this time. Nephrostomy tube draining pink/yellow tinged urine; clean,dry,intact drsg. Bed in low position, locked and call light/personal items within reach. Will report to day shift nurse.

## 2022-02-26 NOTE — PROGRESS NOTES
Creatinine decreased from 5.5-4.4 over the last 24 hours. Currently clinically stable. Awaiting cystectomy on March 1.

## 2022-02-26 NOTE — PROGRESS NOTES
Hospitalist Progress Note   Admit Date:  2022  1:09 PM   Name:  Ahsan Sheldon   Age:  73 y.o. Sex:  female  :  1950   MRN:  [id number]   Room:  [facility]/    Presenting Complaint: Abdominal Pain    Reason(s) for Admission: Acute renal failure (ARF) [facility]) [N17.9]     Hospital Course & Interval History:   Mrs. Estefania Wilkins is a very nice 72 y/o WF with a h/o chronic dcHF (EF 45-50% ) who was admitted to our service on  with RA. She was recently diagnosed with a bladder tumor and underwent cystoscopy with TURBT in December which showed squamous cell carcinoma with extensive necrosis. She had repeat TURBT 21 for debulking. She had recently opted to proceed with radical cystectomy and ileal conduit. She was referred to Cardiology for perioperative risk assessment and routine labs showed hypokalemia and RA so she was referred to the hospital where labs showed K 3.1 and sCr 7.3. She has largely been asymptomatic aside from abdominal pain that has been chronic. Did have oliguria. CT a/p showed R sided hydronephrosis and hydroureter with extrinsic compression from her bladder tumor. Urology consulted who recommended admission and IR consult for percutaneous nephrostomy tube placement. She was started on abx for acute cystitis. Hb on the morning of  dropped from 7.9 to 5.8 though no clinical bleeding noted. Perc nephrostomy placed  with good urine output. Subjective/24hr Events (22): Still c/o constipation but is passing gas. Didn't eat much breakfast. Pain is better. Fluid intake is good. Nephrostomy bag just emptied; 5.4L uop recorded past 24 hours. No chest pain or SOB. ROS:  10 systems reviewed and negative except as noted above. Assessment & Plan:   # RA, post-obstructive              - Due to extrinsic compression from bladder tumor causing R sided hydronephrosis/ureter. Urology following with h/o bladder cancer.  Percutaneous nephrostomy placed  with good uop with 5.4L out since yesterday, net neg almost 7L since admission.     # Iron deficiency anemia              - Baseline Hb ~8 since January, dropped to 5.8 yesterday but no clinical bleeding noted, transfused 1U RBCs. No hematoma or bleeding was noted on CT at admission and Hb has improved and is stable today at 9.4 so PPI was stopped.     # Constipation              - Con't Miralax, Senna added today.     # HypoK              - Resolved. Monitor.     # Acute uncomplicated cystitis/pyuria              - Con't Rocephin. Culture neg to date.     # Squamous cell carcinoma of the bladder              - S/p TURBT x2, opting for outpatient radical cystectomy.     # H/o tobacco abuse              - Quit ~20 years ago    Dispo/Discharge Planning: Cystectomy next Tuesday.   Diet:  ADULT DIET Regular; Low Potassium (Less than 3000 mg/day)  DVT PPx: SCDs  Code status: Full Code    Hospital Problems as of 2/26/2022 Date Reviewed: 2/22/2022          Codes Class Noted - Resolved POA    * (Principal) Acute renal failure (ARF) ([facility] [address] ICD-10-CM: N17.9  ICD-9-CM: 584.9  2/23/2022 - Present Yes        Hypokalemia ICD-10-CM: E87.6  ICD-9-CM: 276.8  2/23/2022 - Present Yes        Acute UTI ICD-10-CM: N39.0  ICD-9-CM: 599.0  2/23/2022 - Present Yes        Kidney congenitally absent, left ICD-10-CM: Q60.0  ICD-9-CM: 753.0  2/23/2022 - Present Yes        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 ([facility] ICD-10-CM: N13.30, C67.9  ICD-9-CM: 591, 188.9  2/23/2022 - Present Yes        Systolic CHF, chronic (HCC) ICD-10-CM: I50.22  ICD-9-CM: 428.22, 428.0  2/2/2022 - Present Yes    Overview Signed 2/2/2022  8:06 AM by Yemi Enrique MD     Echo 2019 [place] 45% with no severe valvular pathology             THAO (obstructive sleep apnea) ICD-10-CM: G47.33  ICD-9-CM: 327.23  2/2/2022 - Present Yes        Hematuria ICD-10-CM: R31.9  ICD-9-CM: 599.70  12/30/2021 - Present Yes        Bladder cancer ([facility] ICD-10-CM: C67.9  ICD-9-CM: 188.9  12/30/2021 - Present Yes              Objective:     Patient Vitals for the past 24 hrs:   Temp Pulse Resp BP SpO2   02/26/22 0702 97.5 °F (36.4 °C) 87 18 128/77 95 %   02/26/22 0413 97.5 °F (36.4 °C) 75 18 (!) 140/85 98 %   02/25/22 2327 97.6 °F (36.4 °C) 84 18 134/78 98 %   02/25/22 1920 98.3 °F (36.8 °C) 90 18 133/78 99 %   02/25/22 1511 97.5 °F (36.4 °C) 86 16 132/73 100 %   02/25/22 1122 97.5 °F (36.4 °C) 82 18 133/68 99 %     Oxygen Therapy  O2 Sat (%): 95 % (02/26/22 0702)  Pulse via Oximetry: 73 beats per minute (02/24/22 1321)  O2 Device: None (Room air) (02/24/22 1457)  O2 Flow Rate (L/min): 3 l/min (02/24/22 1302)  ETCO2 (mmHg): 22 mmHg (02/24/22 1230)    Estimated body mass index is 23.54 kg/m² as calculated from the following:    Height as of this encounter: 5' 2\" (1.575 m). Weight as of this encounter: 58.4 kg (128 lb 11.2 oz). Intake/Output Summary (Last 24 hours) at 2/26/2022 0900  Last data filed at 2/26/2022 0552  Gross per 24 hour   Intake 954 ml   Output 4800 ml   Net -3846 ml         Physical Exam:   Blood pressure 128/77, pulse 87, temperature 97.5 °F (36.4 °C), resp. rate 18, height 5' 2\" (1.575 m), weight 58.4 kg (128 lb 11.2 oz), SpO2 95 %. General:    Well nourished. No overt distress. Appears older than stated age. Head:  Normocephalic, atraumatic  Eyes:  Sclerae appear normal.  Pupils equally round. ENT:  Nares appear normal, no drainage. Moist oral mucosa. Poor dentition. Neck:  No restricted ROM. Trachea midline. CV:   RRR. No m/r/g. No jugular venous distension. Lungs:   CTAB. No wheezing, rhonchi, or rales. Respirations even, unlabored. Abdomen: Bowel sounds present. Soft, nontender, nondistended. Nephrostomy tube R side, no urine in bag (just emptied). Extremities: No cyanosis or clubbing. No edema  Skin:     No rashes and normal coloration. Warm and dry. Neuro:  CN II-XII grossly intact. Sensation intact. A&Ox3. Psych:  Normal mood and affect. I have reviewed ordered lab tests and independently visualized imaging below:    Recent Labs:  Recent Results (from the past 48 hour(s))   HGB & HCT    Collection Time: 02/24/22  2:19 PM   Result Value Ref Range    HGB 9.2 (L) 11.7 - 15.4 g/dL    HCT 29.5 (L) 35.8 - 74.8 %   METABOLIC PANEL, BASIC    Collection Time: 02/25/22  5:58 AM   Result Value Ref Range    Sodium 140 136 - 145 mmol/L    Potassium 3.8 3.5 - 5.1 mmol/L    Chloride 107 98 - 107 mmol/L    CO2 23 21 - 32 mmol/L    Anion gap 10 7 - 16 mmol/L    Glucose 89 65 - 100 mg/dL    BUN 67 (H) 8 - 23 MG/DL    Creatinine 6.20 (H) 0.6 - 1.0 MG/DL    GFR est AA 9 (L) >60 ml/min/1.73m2    GFR est non-AA 7 (L) >60 ml/min/1.73m2    Calcium 8.8 8.3 - 10.4 MG/DL   MAGNESIUM    Collection Time: 02/25/22  5:58 AM   Result Value Ref Range    Magnesium 2.1 1.8 - 2.4 mg/dL   CBC W/O DIFF    Collection Time: 02/25/22  5:58 AM   Result Value Ref Range    WBC 9.4 4.3 - 11.1 K/uL    RBC 3.03 (L) 4.05 - 5.2 M/uL    HGB 7.6 (L) 11.7 - 15.4 g/dL    HCT 24.1 (L) 35.8 - 46.3 %    MCV 79.5 (L) 79.6 - 97.8 FL    MCH 25.1 (L) 26.1 - 32.9 PG    MCHC 31.5 31.4 - 35.0 g/dL    RDW 15.6 (H) 11.9 - 14.6 %    PLATELET 846 296 - 932 K/uL    MPV 9.4 9.4 - 12.3 FL    ABSOLUTE NRBC 0.00 0.0 - 0.2 K/uL   METABOLIC PANEL, BASIC    Collection Time: 02/25/22  4:32 PM   Result Value Ref Range    Sodium 137 136 - 145 mmol/L    Potassium 3.7 3.5 - 5.1 mmol/L    Chloride 105 98 - 107 mmol/L    CO2 25 21 - 32 mmol/L    Anion gap 7 7 - 16 mmol/L    Glucose 107 (H) 65 - 100 mg/dL    BUN 64 (H) 8 - 23 MG/DL    Creatinine 5.50 (H) 0.6 - 1.0 MG/DL    GFR est AA 10 (L) >60 ml/min/1.73m2    GFR est non-AA 8 (L) >60 ml/min/1.73m2    Calcium 8.4 8.3 - 68.9 MG/DL   METABOLIC PANEL, BASIC    Collection Time: 02/26/22  5:08 AM   Result Value Ref Range    Sodium 142 136 - 145 mmol/L    Potassium 5.0 3.5 - 5.1 mmol/L    Chloride 106 98 - 107 mmol/L    CO2 27 21 - 32 mmol/L    Anion gap 9 7 - 16 mmol/L    Glucose 102 (H) 65 - 100 mg/dL    BUN 57 (H) 8 - 23 MG/DL    Creatinine 4.40 (H) 0.6 - 1.0 MG/DL    GFR est AA 13 (L) >60 ml/min/1.73m2    GFR est non-AA 11 (L) >60 ml/min/1.73m2    Calcium 9.1 8.3 - 10.4 MG/DL   MAGNESIUM    Collection Time: 02/26/22  5:08 AM   Result Value Ref Range    Magnesium 1.8 1.8 - 2.4 mg/dL   CBC W/O DIFF    Collection Time: 02/26/22  5:08 AM   Result Value Ref Range    WBC 9.3 4.3 - 11.1 K/uL    RBC 3.73 (L) 4.05 - 5.2 M/uL    HGB 9.4 (L) 11.7 - 15.4 g/dL    HCT 29.8 (L) 35.8 - 46.3 %    MCV 79.9 79.6 - 97.8 FL    MCH 25.2 (L) 26.1 - 32.9 PG    MCHC 31.5 31.4 - 35.0 g/dL    RDW 16.3 (H) 11.9 - 14.6 %    PLATELET 515 514 - 161 K/uL    MPV 9.5 9.4 - 12.3 FL    ABSOLUTE NRBC 0.00 0.0 - 0.2 K/uL   GLUCOSE, POC    Collection Time: 02/26/22  7:04 AM   Result Value Ref Range    Glucose (POC) 125 (H) 65 - 100 mg/dL    Performed by Janelle Mares        All Micro Results     Procedure Component Value Units Date/Time    CULTURE, URINE [[id number]] Collected: 02/23/22 1410    Order Status: Completed Specimen: Cath Urine Updated: 02/26/22 0704     Special Requests: NO SPECIAL REQUESTS        Culture result:       >100,000 COLONIES/mL NORMAL SKIN KAILASH ISOLATED          CULTURE, BLOOD [[id number]] Collected: 02/23/22 1558    Order Status: Completed Specimen: Blood Updated: 02/25/22 0654     Special Requests: --        LEFT  FOREARM       Culture result: NO GROWTH 2 DAYS       CULTURE, BLOOD [[id number]] Collected: 02/23/22 1558    Order Status: Completed Specimen: Blood Updated: 02/25/22 0654     Special Requests: --        RIGHT  ARM       Culture result: NO GROWTH 2 DAYS       COVID-19 RAPID TEST [[id number]] Collected: 02/23/22 1819    Order Status: Completed Specimen: Nasopharyngeal Updated: 02/23/22 1931     Specimen source NASAL        COVID-19 rapid test Not detected        Comment:      The specimen is NEGATIVE for SARS-CoV-2, the novel coronavirus associated with COVID-19.   A negative result does not rule out COVID-19. This test has been authorized by the FDA under an Emergency Use Authorization (EUA) for use by authorized laboratories. Fact sheet for Healthcare Providers: ConventionUpdate.co.nz  Fact sheet for Patients: ConventionUpdate.co.nz       Methodology: Isothermal Nucleic Acid Amplification               Other Studies:  No results found. Current Meds:  Current Facility-Administered Medications   Medication Dose Route Frequency    HYDROcodone-acetaminophen (NORCO) 5-325 mg per tablet 1 Tablet  1 Tablet Oral Q4H PRN    polyethylene glycol (MIRALAX) packet 17 g  17 g Oral BID    0.9% sodium chloride infusion 250 mL  250 mL IntraVENous PRN    sodium chloride (NS) flush 5-10 mL  5-10 mL IntraVENous Q8H    sodium chloride (NS) flush 5-10 mL  5-10 mL IntraVENous PRN    sodium chloride (NS) flush 5-40 mL  5-40 mL IntraVENous Q8H    sodium chloride (NS) flush 5-40 mL  5-40 mL IntraVENous PRN    acetaminophen (TYLENOL) tablet 650 mg  650 mg Oral Q6H PRN    Or    acetaminophen (TYLENOL) suppository 650 mg  650 mg Rectal Q6H PRN    ondansetron (ZOFRAN ODT) tablet 4 mg  4 mg Oral Q8H PRN    Or    ondansetron (ZOFRAN) injection 4 mg  4 mg IntraVENous Q6H PRN    cefTRIAXone (ROCEPHIN) 1 g in 0.9% sodium chloride (MBP/ADV) 50 mL MBP  1 g IntraVENous Q24H       Signed:  Shavon Brown MD    Part of this note may have been written by using a voice dictation software. The note has been proof read but may still contain some grammatical/other typographical errors.

## 2022-02-26 NOTE — PROGRESS NOTES
Hourly rounds performed, all needs met at this time. Pain medication given per MAR. Large amount of urine output (see flowsheet) out of nephro tube. Will continue to monitor and give report to oncoming nurse.

## 2022-02-27 PROBLEM — K59.00 CONSTIPATION: Status: ACTIVE | Noted: 2022-01-01

## 2022-02-27 PROBLEM — E87.6 HYPOKALEMIA: Status: RESOLVED | Noted: 2022-01-01 | Resolved: 2022-01-01

## 2022-02-27 PROBLEM — K59.00 CONSTIPATION: Status: RESOLVED | Noted: 2022-01-01 | Resolved: 2022-01-01

## 2022-02-27 PROBLEM — N39.0 ACUTE UTI: Status: RESOLVED | Noted: 2022-01-01 | Resolved: 2022-01-01

## 2022-02-27 NOTE — PROGRESS NOTES
Hospitalist Progress Note   Admit Date:  2022  1:09 PM   Name:  Ahsan Sheldon   Age:  73 y.o. Sex:  female  :  1950   MRN:  [id number]   Room:  [facility]/    Presenting Complaint: Abdominal Pain    Reason(s) for Admission: Acute renal failure (ARF) [facility]) [N17.9]     Hospital Course & Interval History:   Mrs. Estefania Wilkins is a very nice 72 y/o WF with a h/o chronic dcHF (EF 45-50% ) who was admitted to our service on  with RA. She was recently diagnosed with a bladder tumor and underwent cystoscopy with TURBT in December which showed squamous cell carcinoma with extensive necrosis. She had repeat TURBT 21 for debulking. She had recently opted to proceed with radical cystectomy and ileal conduit. She was referred to Cardiology for perioperative risk assessment and routine labs showed hypokalemia and RA so she was referred to the hospital where labs showed K 3.1 and sCr 7.3. She has largely been asymptomatic aside from abdominal pain that has been chronic. Did have oliguria. CT a/p showed R sided hydronephrosis and hydroureter with extrinsic compression from her bladder tumor. Urology consulted who recommended admission and IR consult for percutaneous nephrostomy tube placement. She was started on abx for acute cystitis. Hb on the morning of  dropped from 7.9 to 5.8 though no clinical bleeding noted. Perc nephrostomy placed  with good urine output. Cr improved. Planning for radical cystectomy on Tuesday 3/1. Subjective/24hr Events (22):  Cr down to 2.8 today, finally had a BM this morning. Good uop from nephrostomy tube. Pain has improved. No SOB, N/V.    ROS:  10 systems reviewed and negative except as noted above. Assessment & Plan:   # RA, post-obstructive              - Due to extrinsic compression from bladder tumor causing R sided hydronephrosis/ureter. Percutaneous nephrostomy placed  great urine output since and Cr has improved to 2.8 today.  Urology following, planning cystectomy on Tues 3/1.     # Iron deficiency anemia              - Baseline Hb ~8 since January, dropped to 5.8 yesterday but no clinical bleeding noted, transfused 1U RBCs. No hematoma or bleeding was noted on CT at admission. Hb 8.6, PPI stopped previously given lack of clinical bleeding.     # Constipation              - Resolved. Change Miralax and Senna to PRN      # HypoK              - KZIQUMYQ. Monitor.     # Acute uncomplicated cystitis/pyuria              - Con't Rocephin. Culture neg to date.     # Squamous cell carcinoma of the bladder              - S/p TURBT x2, planning for cystectomy on Tues 3/1     # H/o tobacco abuse              - Quit ~20 years ago    Dispo/Discharge Planning: Hopefully home when able.   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  ICD-9-CM: 753.0  2/23/2022 - Present Yes        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 ([facility] ICD-10-CM: N13.30, C67.9  ICD-9-CM: 591, 188.9  2/23/2022 - Present Yes        Systolic CHF, chronic (HCC) ICD-10-CM: I50.22  ICD-9-CM: 428.22, 428.0  2/2/2022 - Present Yes    Overview Signed 2/2/2022  8:06 AM by Deanna Gauthier MD     Echo 2019 [place] 45% with no severe valvular pathology             THAO (obstructive sleep apnea) ICD-10-CM: G47.33  ICD-9-CM: 327.23  2/2/2022 - Present Yes        Hematuria ICD-10-CM: R31.9  ICD-9-CM: 599.70  12/30/2021 - Present Yes        Bladder cancer ([facility] ICD-10-CM: C67.9  ICD-9-CM: 188.9  12/30/2021 - Present Yes        RESOLVED: Constipation ICD-10-CM: K59.00  ICD-9-CM: 564.00  2/27/2022 - 2/27/2022 Unknown        RESOLVED: Hypokalemia ICD-10-CM: E87.6  ICD-9-CM: 276.8  2/23/2022 - 2/27/2022 Yes        RESOLVED: Acute UTI ICD-10-CM: N39.0  ICD-9-CM: 599.0  2/23/2022 - 2/27/2022 Yes              Objective:     Patient Vitals for the past 24 hrs:   Temp Pulse Resp BP SpO2   02/27/22 1100 97.5 °F (36.4 °C) 77 20 (!) 127/59 97 %   02/27/22 0718 97.9 °F (36.6 °C) 87  (!) 122/56 95 %   02/27/22 0357 98.2 °F (36.8 °C) 97 16 109/64 93 %   02/26/22 2346 98.1 °F (36.7 °C) 90 18 116/70 95 %   02/26/22 1930 98.1 °F (36.7 °C) 93 18 (!) 110/53 95 %   02/26/22 1603 97.6 °F (36.4 °C) 92 17 (!) 114/48 97 %     Oxygen Therapy  O2 Sat (%): 97 % (02/27/22 1100)  Pulse via Oximetry: 73 beats per minute (02/24/22 1321)  O2 Device: None (Room air) (02/24/22 1457)  O2 Flow Rate (L/min): 3 l/min (02/24/22 1302)  ETCO2 (mmHg): 22 mmHg (02/24/22 1230)    Estimated body mass index is 22.63 kg/m² as calculated from the following:    Height as of this encounter: 5' 2\" (1.575 m). Weight as of this encounter: 56.1 kg (123 lb 11.2 oz). Intake/Output Summary (Last 24 hours) at 2/27/2022 1143  Last data filed at 2/27/2022 0911  Gross per 24 hour   Intake 480 ml   Output 3002 ml   Net -2522 ml         Physical Exam:   Blood pressure (!) 127/59, pulse 77, temperature 97.5 °F (36.4 °C), resp. rate 20, height 5' 2\" (1.575 m), weight 56.1 kg (123 lb 11.2 oz), SpO2 97 %. General:    Well nourished. No overt distress. Head:  Normocephalic, atraumatic  Eyes:  Sclerae appear normal.  Pupils equally round. ENT:  Nares appear normal, no drainage. Moist oral mucosa  Neck:  No restricted ROM. Trachea midline   CV:   RRR. No m/r/g. No jugular venous distension. Lungs:   CTAB. No wheezing, rhonchi, or rales. Respirations even, unlabored. Abdomen: Bowel sounds present. Soft, nontender, nondistended. R sided nephrostomy tube, clear yellow urine in bag. Extremities: No cyanosis or clubbing. No edema  Skin:     No rashes and normal coloration. Warm and dry.     Neuro:  CN II-XII grossly intact. Sensation intact. A&Ox3  Psych:  Normal mood and affect.       I have reviewed ordered lab tests and independently visualized imaging below:    Recent Labs:  Recent Results (from the past 48 hour(s))   METABOLIC PANEL, BASIC    Collection Time: 02/25/22  4:32 PM   Result Value Ref Range    Sodium 137 136 - 145 mmol/L    Potassium 3.7 3.5 - 5.1 mmol/L    Chloride 105 98 - 107 mmol/L    CO2 25 21 - 32 mmol/L    Anion gap 7 7 - 16 mmol/L    Glucose 107 (H) 65 - 100 mg/dL    BUN 64 (H) 8 - 23 MG/DL    Creatinine 5.50 (H) 0.6 - 1.0 MG/DL    GFR est AA 10 (L) >60 ml/min/1.73m2    GFR est non-AA 8 (L) >60 ml/min/1.73m2    Calcium 8.4 8.3 - 47.0 MG/DL   METABOLIC PANEL, BASIC    Collection Time: 02/26/22  5:08 AM   Result Value Ref Range    Sodium 142 136 - 145 mmol/L    Potassium 5.0 3.5 - 5.1 mmol/L    Chloride 106 98 - 107 mmol/L    CO2 27 21 - 32 mmol/L    Anion gap 9 7 - 16 mmol/L    Glucose 102 (H) 65 - 100 mg/dL    BUN 57 (H) 8 - 23 MG/DL    Creatinine 4.40 (H) 0.6 - 1.0 MG/DL    GFR est AA 13 (L) >60 ml/min/1.73m2    GFR est non-AA 11 (L) >60 ml/min/1.73m2    Calcium 9.1 8.3 - 10.4 MG/DL   MAGNESIUM    Collection Time: 02/26/22  5:08 AM   Result Value Ref Range    Magnesium 1.8 1.8 - 2.4 mg/dL   CBC W/O DIFF    Collection Time: 02/26/22  5:08 AM   Result Value Ref Range    WBC 9.3 4.3 - 11.1 K/uL    RBC 3.73 (L) 4.05 - 5.2 M/uL    HGB 9.4 (L) 11.7 - 15.4 g/dL    HCT 29.8 (L) 35.8 - 46.3 %    MCV 79.9 79.6 - 97.8 FL    MCH 25.2 (L) 26.1 - 32.9 PG    MCHC 31.5 31.4 - 35.0 g/dL    RDW 16.3 (H) 11.9 - 14.6 %    PLATELET 044 990 - 983 K/uL    MPV 9.5 9.4 - 12.3 FL    ABSOLUTE NRBC 0.00 0.0 - 0.2 K/uL   GLUCOSE, POC    Collection Time: 02/26/22  7:04 AM   Result Value Ref Range    Glucose (POC) 125 (H) 65 - 100 mg/dL    Performed by Maurice Castro    METABOLIC PANEL, BASIC    Collection Time: 02/27/22  5:10 AM   Result Value Ref Range    Sodium 140 136 - 145 mmol/L    Potassium 3.6 3.5 - 5.1 mmol/L    Chloride 104 98 - 107 mmol/L    CO2 31 21 - 32 mmol/L    Anion gap 5 (L) 7 - 16 mmol/L    Glucose 92 65 - 100 mg/dL    BUN 49 (H) 8 - 23 MG/DL    Creatinine 2.80 (H) 0.6 - 1.0 MG/DL    GFR est AA 22 (L) >60 ml/min/1.73m2    GFR est non-AA 18 (L) >60 ml/min/1.73m2    Calcium 9.2 8.3 - 10.4 MG/DL   HGB & HCT    Collection Time: 02/27/22  5:10 AM   Result Value Ref Range    HGB 8.6 (L) 11.7 - 15.4 g/dL    HCT 28.0 (L) 35.8 - 46.3 %       All Micro Results     Procedure Component Value Units Date/Time    CULTURE, BLOOD [[id number]] Collected: 02/23/22 1558    Order Status: Completed Specimen: Blood Updated: 02/27/22 0638     Special Requests: --        RIGHT  ARM       Culture result: NO GROWTH 4 DAYS       CULTURE, BLOOD [[id number]] Collected: 02/23/22 1558    Order Status: Completed Specimen: Blood Updated: 02/27/22 0638     Special Requests: --        LEFT  FOREARM       Culture result: NO GROWTH 4 DAYS       CULTURE, URINE [[id number]] Collected: 02/23/22 1410    Order Status: Completed Specimen: Cath Urine Updated: 02/26/22 0704     Special Requests: NO SPECIAL REQUESTS        Culture result:       >100,000 COLONIES/mL NORMAL SKIN KAILASH ISOLATED          COVID-19 RAPID TEST [[id number]] Collected: 02/23/22 1819    Order Status: Completed Specimen: Nasopharyngeal Updated: 02/23/22 1931     Specimen source NASAL        COVID-19 rapid test Not detected        Comment:      The specimen is NEGATIVE for SARS-CoV-2, the novel coronavirus associated with COVID-19. A negative result does not rule out COVID-19. This test has been authorized by the FDA under an Emergency Use Authorization (EUA) for use by authorized laboratories. Fact sheet for Healthcare Providers: ConventionUpdate.co.nz  Fact sheet for Patients: ConventionUpdate.co.nz       Methodology: Isothermal Nucleic Acid Amplification               Other Studies:  No results found.     Current Meds:  Current Facility-Administered Medications   Medication Dose Route Frequency    polyethylene glycol (MIRALAX) packet 17 g  17 g Oral BID PRN    senna (SENOKOT) tablet 17.2 mg  2 Tablet Oral BID PRN    HYDROcodone-acetaminophen (NORCO) 5-325 mg per tablet 1 Tablet  1 Tablet Oral Q4H PRN    0.9% sodium chloride infusion 250 mL  250 mL IntraVENous PRN    sodium chloride (NS) flush 5-10 mL  5-10 mL IntraVENous Q8H    sodium chloride (NS) flush 5-10 mL  5-10 mL IntraVENous PRN    sodium chloride (NS) flush 5-40 mL  5-40 mL IntraVENous Q8H    sodium chloride (NS) flush 5-40 mL  5-40 mL IntraVENous PRN    acetaminophen (TYLENOL) tablet 650 mg  650 mg Oral Q6H PRN    Or    acetaminophen (TYLENOL) suppository 650 mg  650 mg Rectal Q6H PRN    ondansetron (ZOFRAN ODT) tablet 4 mg  4 mg Oral Q8H PRN    Or    ondansetron (ZOFRAN) injection 4 mg  4 mg IntraVENous Q6H PRN       Signed:  Aries Aly MD    Part of this note may have been written by using a voice dictation software. The note has been proof read but may still contain some grammatical/other typographical errors.

## 2022-02-27 NOTE — PROGRESS NOTES
Problem: Falls - Risk of  Goal: *Absence of Falls  Description: Document Klaus Cormier Fall Risk and appropriate interventions in the flowsheet.   Outcome: Progressing Towards Goal  Note: Fall Risk Interventions:  Mobility Interventions: Patient to call before getting OOB         Medication Interventions: Bed/chair exit alarm    Elimination Interventions: Bed/chair exit alarm,Call light in reach              Problem: Patient Education: Go to Patient Education Activity  Goal: Patient/Family Education  Outcome: Progressing Towards Goal     Problem: Patient Education: Go to Patient Education Activity  Goal: Patient/Family Education  Outcome: Progressing Towards Goal     Problem: Acute Renal Failure: Day 1  Goal: Off Pathway (Use only if patient is Off Pathway)  Outcome: Progressing Towards Goal  Goal: Activity/Safety  Outcome: Progressing Towards Goal  Goal: Consults, if ordered  Outcome: Progressing Towards Goal  Goal: Diagnostic Test/Procedures  Outcome: Progressing Towards Goal  Goal: Nutrition/Diet  Outcome: Progressing Towards Goal  Goal: Discharge Planning  Outcome: Progressing Towards Goal  Goal: Medications  Outcome: Progressing Towards Goal  Goal: Respiratory  Outcome: Progressing Towards Goal  Goal: Treatments/Interventions/Procedures  Outcome: Progressing Towards Goal  Goal: Psychosocial  Outcome: Progressing Towards Goal  Goal: *Optimal pain control at patient's stated goal  Outcome: Progressing Towards Goal  Goal: *Urinary output within identified parameters  Outcome: Progressing Towards Goal  Goal: *Hemodynamically stable  Outcome: Progressing Towards Goal  Goal: *Tolerating diet  Outcome: Progressing Towards Goal     Problem: Acute Renal Failure: Day 2  Goal: Off Pathway (Use only if patient is Off Pathway)  Outcome: Progressing Towards Goal  Goal: Activity/Safety  Outcome: Progressing Towards Goal  Goal: Consults, if ordered  Outcome: Progressing Towards Goal  Goal: Diagnostic Test/Procedures  Outcome: Progressing Towards Goal  Goal: Nutrition/Diet  Outcome: Progressing Towards Goal  Goal: Discharge Planning  Outcome: Progressing Towards Goal  Goal: Medications  Outcome: Progressing Towards Goal  Goal: Respiratory  Outcome: Progressing Towards Goal  Goal: Treatments/Interventions/Procedures  Outcome: Progressing Towards Goal  Goal: Psychosocial  Outcome: Progressing Towards Goal  Goal: *Optimal pain control at patient's stated goal  Outcome: Progressing Towards Goal  Goal: *Urinary output within identified parameters  Outcome: Progressing Towards Goal  Goal: *Hemodynamically stable  Outcome: Progressing Towards Goal  Goal: *Tolerating diet  Outcome: Progressing Towards Goal  Goal: *Lab values improving  Outcome: Progressing Towards Goal     Problem: Acute Renal Failure: Day 3  Goal: Off Pathway (Use only if patient is Off Pathway)  Outcome: Progressing Towards Goal  Goal: Activity/Safety  Outcome: Progressing Towards Goal  Goal: Consults, if ordered  Outcome: Progressing Towards Goal  Goal: Diagnostic Test/Procedures  Outcome: Progressing Towards Goal  Goal: Nutrition/Diet  Outcome: Progressing Towards Goal  Goal: Discharge Planning  Outcome: Progressing Towards Goal  Goal: Medications  Outcome: Progressing Towards Goal  Goal: Respiratory  Outcome: Progressing Towards Goal  Goal: Treatments/Interventions/Procedures  Outcome: Progressing Towards Goal  Goal: Psychosocial  Outcome: Progressing Towards Goal  Goal: *Optimal pain control at patient's stated goal  Outcome: Progressing Towards Goal  Goal: *Urinary output within identified parameters  Outcome: Progressing Towards Goal  Goal: *Hemodynamically stable  Outcome: Progressing Towards Goal  Goal: *Tolerating diet  Outcome: Progressing Towards Goal  Goal: *Lab values improving  Outcome: Progressing Towards Goal     Problem: Acute Renal Failure: Day 4  Goal: Off Pathway (Use only if patient is Off Pathway)  Outcome: Progressing Towards Goal  Goal: Activity/Safety  Outcome: Progressing Towards Goal  Goal: Consults, if ordered  Outcome: Progressing Towards Goal  Goal: Diagnostic Test/Procedures  Outcome: Progressing Towards Goal  Goal: Nutrition/Diet  Outcome: Progressing Towards Goal  Goal: Discharge Planning  Outcome: Progressing Towards Goal  Goal: Medications  Outcome: Progressing Towards Goal  Goal: Respiratory  Outcome: Progressing Towards Goal  Goal: Treatments/Interventions/Procedures  Outcome: Progressing Towards Goal  Goal: Psychosocial  Outcome: Progressing Towards Goal  Goal: *Optimal pain control at patient's stated goal  Outcome: Progressing Towards Goal  Goal: *Urinary output within identified parameters  Outcome: Progressing Towards Goal  Goal: *Hemodynamically stable  Outcome: Progressing Towards Goal  Goal: *Tolerating diet  Outcome: Progressing Towards Goal  Goal: *Lab values improving  Outcome: Progressing Towards Goal     Problem: Acute Renal Failure: Day 5  Goal: Off Pathway (Use only if patient is Off Pathway)  Outcome: Progressing Towards Goal  Goal: Activity/Safety  Outcome: Progressing Towards Goal  Goal: Diagnostic Test/Procedures  Outcome: Progressing Towards Goal  Goal: Nutrition/Diet  Outcome: Progressing Towards Goal  Goal: Discharge Planning  Outcome: Progressing Towards Goal  Goal: Medications  Outcome: Progressing Towards Goal  Goal: Respiratory  Outcome: Progressing Towards Goal  Goal: Treatments/Interventions/Procedures  Outcome: Progressing Towards Goal  Goal: Psychosocial  Outcome: Progressing Towards Goal  Goal: *Optimal pain control at patient's stated goal  Outcome: Progressing Towards Goal  Goal: *Urinary output within identified parameters  Outcome: Progressing Towards Goal  Goal: *Hemodynamically stable  Outcome: Progressing Towards Goal  Goal: *Tolerating diet  Outcome: Progressing Towards Goal  Goal: *Lab values improving  Outcome: Progressing Towards Goal

## 2022-02-28 NOTE — PROGRESS NOTES
ACUTE PHYSICAL THERAPY GOALS:  (Developed with and agreed upon by patient and/or caregiver.)  1. Pt will perform bed mobility (I) without cueing in 7 therapy sessions. 2. Pt will perform all transfers under (S) c use of LRAD in 7 therapy sessions. 3. Pt will ambulate 200 ft SB (A) with use of LRAD and breaks as needed in 7 therapy sessions. 4. Pt will perform standing balance activities with minimal postural sway in 7 therapy sessions. 5. Pt will tolerate multiple sets and reps of BLE exercises in 7 therapy sessions. PHYSICAL THERAPY: Daily Note and AM Treatment Day # 2    Theodore St is a 73 y.o. female   PRIMARY DIAGNOSIS: Acute renal failure (ARF) ([address]  Acute renal failure (ARF) ([facility] [address] [N17.9]         ASSESSMENT:     REHAB RECOMMENDATIONS: CURRENT LEVEL OF FUNCTION:  (Most Recently Demonstrated)   Recommendation to date pending progress:  Setting:   TBD following surgery on 2/29  Equipment:    Rolling Walker Bed Mobility:   Not tested  Sit to Stand:   Standby Assistance  Transfers:   Not tested  Gait/Mobility:  [employer] Assistance     ASSESSMENT:  Ms. Devin Silver was seated in chair on RA upon entering the room and agreeable to therapy. Today, pt demonstrates good progress toward established goals as she was able to make notable improvements in activity tolerance and did-so without requiring inc level of (A) from therapy staff. This session, pt required SB (A) for sit-to-stand from chair while requiring CG (A) c use of RW/ gait belt for ambulation. Pt cont to ambulate c dec michael, inc postural sway, dec foot clearance YULY and step-through strategy although she does well to remain steady within RW. This session, pt inc ambulation distance to 250'x1 with 1-2 standing rest-breaks and no LOB. At this time, pt remains a good candidate for skilled PT and will continue to benefit from advancing POC. At end of session, pt was positioned to comfort in chair with all needs in reach.  RN was made aware of pt performance.         SUBJECTIVE:   Ms. Kelsi Matt states, \"I'll run right down the hallway\"    SOCIAL HISTORY/ LIVING ENVIRONMENT: See Eval  Home Environment: Private residence  One/Two Story Residence: One story  Living Alone: No  Support Systems: Spouse/Significant Other  OBJECTIVE:     PAIN: VITAL SIGNS: LINES/DRAINS:   Pre Treatment: Pain Screen  Pain Scale 1: Numeric (0 - 10)  Pain Intensity 1: 0  Pain Onset 1: ongoing  Pain Location 1: Abdomen  Pain Orientation 1: Mid  Pain Description 1: Constant  Pain Intervention(s) 1: Ambulation/Increased Activity  Post Treatment: 0 Vital Signs  O2 Sat (%): 97 %  O2 Device: None (Room air) None  O2 Device: None (Room air)     MOBILITY: I Mod I S SBA CGA Min Mod Max Total  NT x2 Comments:   Bed Mobility    Rolling [] [] [] [] [] [] [] [] [] [x] []    Supine to Sit [] [] [] [] [] [] [] [] [] [x] []    Scooting [] [] [] [] [] [] [] [] [] [x] []    Sit to Supine [] [] [] [] [] [] [] [] [] [x] []    Transfers    Sit to Stand [] [] [] [x] [] [] [] [] [] [] []    Bed to Chair [] [] [] [] [] [] [] [] [] [x] []    Stand to Sit [] [] [] [x] [] [] [] [] [] [] []    I=Independent, Mod I=Modified Independent, S=Supervision, SBA=Standby Assistance, CGA=Contact Guard Assistance,   Min=Minimal Assistance, Mod=Moderate Assistance, Max=Maximal Assistance, Total=Total Assistance, NT=Not Tested    BALANCE: Good Fair+ Fair Fair- Poor NT Comments   Sitting Static [x] [] [] [] [] []    Sitting Dynamic [x] [] [] [] [] []              Standing Static [x] [] [] [] [] []    Standing Dynamic [] [x] [] [] [] []      GAIT: I Mod I S SBA CGA Min Mod Max Total  NT x2 Comments:   Level of Assistance [] [] [] [] [x] [] [] [] [] [] []    Distance 250'x1    DME Rolling Walker    Gait Quality Dec michael, inc postural sway, dec foot clearance    Weightbearing  Status N/A     I=Independent, Mod I=Modified Independent, S=Supervision, SBA=Standby Assistance, CGA=Contact Guard Assistance,   Min=Minimal Assistance, Mod=Moderate Assistance, Max=Maximal Assistance, Total=Total Assistance, NT=Not Tested    PLAN:   FREQUENCY/DURATION: PT Plan of Care: 3 times/week for duration of hospital stay or until stated goals are met, whichever comes first.  TREATMENT:     TREATMENT:   ($$ Therapeutic Activity: 23-37 mins    )  Therapeutic Activity (25 Minutes): Therapeutic activity included Scooting, Ambulation on level ground, Sitting balance  and Standing balance to improve functional Mobility, Strength and Activity tolerance.     TREATMENT GRID:  N/A    AFTER TREATMENT POSITION/PRECAUTIONS:  Chair, Needs within reach and RN notified    INTERDISCIPLINARY COLLABORATION:  RN/PCT and PT/PTA    TOTAL TREATMENT DURATION:  PT Patient Time In/Time Out  Time In: 1106  Time Out: [address] Sumner, RUBEN

## 2022-02-28 NOTE — PROGRESS NOTES
Patient sitting up in bed. States completed Ensure Surgery Immunonutrition drinks before surgery on 2-27-22. Provided patient with 3 bottles of Pre Surgery Carb Loading drinks. Instructed to drink 2 tonight before bed and 1 in the am 2 hours before surgery and to wash with Hibiclens  tonight and in am before surgery. Patient verbalized understanding. Assisted patient to ambulate 1/2 lap in hallway. No acute distress noted. [facility] is still using IS. Demonstrated use (1000 ml total volume).

## 2022-02-28 NOTE — ANESTHESIA PREPROCEDURE EVALUATION
Relevant Problems   RESPIRATORY SYSTEM   (+) THAO (obstructive sleep apnea)      RENAL FAILURE   (+) Acute renal failure (ARF) (HCC)   (+) 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 (HCC)   (+) Kidney congenitally absent, left      PERSONAL HX & FAMILY HX OF CANCER   (+) Bladder cancer (HCC)       Anesthetic History   No history of anesthetic complications            Review of Systems / Medical History  Patient summary reviewed and pertinent labs reviewed    Pulmonary        Sleep apnea: No treatment  Smoker (Former heavy smoker)         Neuro/Psych              Cardiovascular                  Exercise tolerance: <4 METS: Patient deconditioned  Comments: ECHO 2/22 - EF 45%. Mild MR/TR. RVSP 32     GI/Hepatic/Renal         Renal disease (RA - renal function improving (s/p perc neph)): ARF       Endo/Other        Cancer and anemia (Hgb 8.2)     Other Findings            Physical Exam    Airway  Mallampati: II  TM Distance: 4 - 6 cm  Neck ROM: normal range of motion   Mouth opening: Normal     Cardiovascular    Rhythm: regular  Rate: normal         Dental    Dentition: Poor dentition  Comments: VERY POOR DENTITION   Pulmonary      Decreased breath sounds: bilateral           Abdominal  GI exam deferred       Other Findings            Anesthetic Plan    ASA: 3  Anesthesia type: general    Monitoring Plan: Arterial line and CVP  Post-op pain plan if not by surgeon: peripheral nerve block single    Induction: Intravenous  Anesthetic plan and risks discussed with: Patient      Difficult IV access - will need CVC.   Discussed TAP blocks and potential for post-op ventilation

## 2022-02-28 NOTE — PROGRESS NOTES
Admit Date: 2/23/2022    Subjective:     Bettie Bettencourt is resting. NT in place- urine will/clear. She is afebrile, vss. She had BM today. Objective:     Patient Vitals for the past 8 hrs:   BP Temp Pulse Resp SpO2   02/28/22 1134 112/71 97.4 °F (36.3 °C) (!) 104 16 97 %   02/28/22 1106     97 %   02/28/22 0730 113/72 97.8 °F (36.6 °C) 94 16 98 %     02/28 0701 - 02/28 1900  In: 240 [P.O.:240]  Out: 400 [Urine:400]  02/26 1901 - 02/28 0700  In: 120 [P.O.:120]  Out: 4002 [Urine:4002]    Physical Exam:  GENERAL: alert, cooperative, no distress  LUNG: clear to auscultation bilaterally  HEART: regular rate and rhythm, S1, S2  ABDOMEN: soft, non-tender  NEUROLOGIC: AOx3    Data Review   Recent Results (from the past 24 hour(s))   METABOLIC PANEL, BASIC    Collection Time: 02/28/22  5:00 AM   Result Value Ref Range    Sodium 140 136 - 145 mmol/L    Potassium 3.5 3.5 - 5.1 mmol/L    Chloride 103 98 - 107 mmol/L    CO2 34 (H) 21 - 32 mmol/L    Anion gap 3 (L) 7 - 16 mmol/L    Glucose 94 65 - 100 mg/dL    BUN 44 (H) 8 - 23 MG/DL    Creatinine 2.10 (H) 0.6 - 1.0 MG/DL    GFR est AA 30 (L) >60 ml/min/1.73m2    GFR est non-AA 25 (L) >60 ml/min/1.73m2    Calcium 9.2 8.3 - 10.4 MG/DL   HGB & HCT    Collection Time: 02/28/22  5:00 AM   Result Value Ref Range    HGB 8.3 (L) 11.7 - 15.4 g/dL    HCT 27.6 (L) 35.8 - 46.3 %   RBC, ALLOCATE    Collection Time: 02/28/22  9:15 AM   Result Value Ref Range    HISTORY CHECKED?  Historical check performed    TYPE & SCREEN    Collection Time: 02/28/22  9:39 AM   Result Value Ref Range    Crossmatch Expiration 03/03/2022,2359     ABO/Rh(D) O NEGATIVE     Antibody screen NEG     Unit number [id number]     Blood component type RC LR     Unit division 00     Status of unit ALLOCATED     Crossmatch result Compatible        Assessment:     Principal Problem:    Acute renal failure (ARF) ([facility] (2/23/2022)    Active Problems:    Hematuria (12/30/2021)      Bladder cancer ([facility] (91/16/3809)      Systolic CHF, chronic (HCC) (2/2/2022)      Overview: Echo 2019 [place] 45% with no severe valvular pathology      THAO (obstructive sleep apnea) (2/2/2022)      Kidney congenitally absent, left (2/23/2022)      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 ([facility] [address] (2/23/2022)      71 y.o. female with bladder cancer, new onset RA and R hydronephrosis in a solitary kidney.  Followed by Dr. Alfonso Will for at least T2 poorly differentiated CaB.  Had R NT placed 2/24. Radical cystectomy with diversion planned for tomorrow.  Cr has improved to 2.10. Hgb 8.3, hct 27.6 today. Plan:     Transfuse 1 unit PRBC, goal is hct >30. Check CBC/BMP tomorrow. NPO at [facility] for scheduled radical cystectomy tomorrow. Verify consent has been obtained (see signed/held orders).               Gurvinder Vernon NP  [facility]

## 2022-02-28 NOTE — PROGRESS NOTES
CM reviewed patient's chart for continued stay. Patient with a planned procedure-radical cystectomy tomorrow 3/1. PT following. Per PT evaluation this day, TBD following surgery on 3/1. CM will continue to follow plan of care and therapy's recommendations to assist further with discharge planning. Disposition- TBD. CM will remain available to assist as needed.

## 2022-02-28 NOTE — PROGRESS NOTES
Hospitalist Progress Note   Admit Date:  2022  1:09 PM   Name:  Nelson Marquez   Age:  73 y.o. Sex:  female  :  1950   MRN:  [id number]   Room:  [facility]/    Presenting Complaint: Abdominal Pain    Reason(s) for Admission: Acute renal failure (ARF) [facility]) [N17.9]     Hospital Course & Interval History:   Mrs. 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 She was recently diagnosed with a bladder tumor and underwent cystoscopy with TURBT in December which showed squamous cell carcinoma with extensive necrosis. She had repeat TURBT 21 for debulking. She had recently opted to proceed with radical cystectomy and ileal conduit. She was referred to Cardiology for perioperative risk assessment and routine labs showed hypokalemia and RA so she was referred to the hospital where labs showed K 3.1 and sCr 7.3. She has largely been asymptomatic aside from abdominal pain that has been chronic. Did have oliguria. CT a/p showed R sided hydronephrosis and hydroureter with extrinsic compression from her bladder tumor. Urology consulted who recommended admission and IR consult for percutaneous nephrostomy tube placement. She was started on abx for acute cystitis. Hb on the morning of  dropped from 7.9 to 5.8 though no clinical bleeding noted. Perc nephrostomy placed  with good urine output. Cr improved. Planning for radical cystectomy on Tuesday 3/1. Subjective/24hr Events (22): Ambulated with therapy today and did well. Good appetite. No pain, SOB or N/V/D. Surgery planned for tomorrow. Cr improved to 2.1.     ROS:  10 systems reviewed and negative except as noted above. Assessment & Plan:   # RA, post-obstructive              - Due to extrinsic compression from bladder tumor causing R sided hydronephrosis/ureter.  Percutaneous nephrostomy placed , ongoing urine output with Cr now 2.1 and improving daily. Urology planning cystectomy on es 3/1.     # Iron deficiency anemia              - Baseline Hb ~8 since January, did drop to 5.8 and was transfused, though no clinical bleeding or hematoma, etc noted on CT at admission. PPI was stopped. Hb has since been stable.     # Constipation              - Resolved. Bowel regimen PRN.    # HypoK              - SIIUFVFD.     # Acute uncomplicated cystitis/pyuria              - Con't Rocephin. Culture neg to date.     # Squamous cell carcinoma of the bladder              - S/p TURBT x2. Radical cystectomy on es 3/1     # H/o tobacco abuse              - Quit ~20 years ago    Dispo/Discharge Planning: Pending. Diet:  ADULT DIET Regular; Low Potassium (Less than 3000 mg/day)  DIET NPO  DVT PPx: SCDs, ambulation.   Code status: Full Code    Hospital Problems as of 2/28/2022 Date Reviewed: 2/22/2022          Codes Class Noted - Resolved POA    * (Principal) Acute renal failure (ARF) ([facility].) ICD-10-CM: N17.9  ICD-9-CM: 584.9  2/23/2022 - Present Yes        Kidney congenitally absent, left ICD-10-CM: Q60.0  ICD-9-CM: 753.0  2/23/2022 - Present Yes        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 ([facility] ICD-10-CM: N13.30, C67.9  ICD-9-CM: 591, 188.9  2/23/2022 - Present Yes        Systolic CHF, chronic (HCC) ICD-10-CM: I50.22  ICD-9-CM: 428.22, 428.0  2/2/2022 - Present Yes    Overview Signed 2/2/2022  8:06 AM by Ailsa De La O MD     Echo 2019 Corine 45% with no severe valvular pathology             THAO (obstructive sleep apnea) ICD-10-CM: G47.33  ICD-9-CM: 327.23  2/2/2022 - Present Yes        Hematuria ICD-10-CM: R31.9  ICD-9-CM: 599.70  12/30/2021 - Present Yes        Bladder cancer ([facility] ICD-10-CM: C67.9  ICD-9-CM: 188.9  12/30/2021 - Present Yes        RESOLVED: Constipation ICD-10-CM: K59.00  ICD-9-CM: 564.00  2/27/2022 - 2/27/2022 Unknown        RESOLVED: Hypokalemia ICD-10-CM: E87.6  ICD-9-CM: 276.8  2/23/2022 - 2/27/2022 Yes        RESOLVED: Acute UTI ICD-10-CM: N39.0  ICD-9-CM: 599.0  2/23/2022 - 2/27/2022 Yes              Objective:     Patient Vitals for the past 24 hrs:   Temp Pulse Resp BP SpO2   02/28/22 1134 97.4 °F (36.3 °C) (!) 104 16 112/71 97 %   02/28/22 1106     97 %   02/28/22 0730 97.8 °F (36.6 °C) 94 16 113/72 98 %   02/28/22 0454 97.9 °F (36.6 °C) 100 18 (!) 96/58 94 %   02/27/22 2329 98.1 °F (36.7 °C) 85 18 93/60 94 %   02/27/22 1937 97.8 °F (36.6 °C) 100 18 113/76 94 %   02/27/22 1453 98.1 °F (36.7 °C) 94 16 119/86 97 %     Oxygen Therapy  O2 Sat (%): 97 % (02/28/22 1134)  Pulse via Oximetry: 73 beats per minute (02/24/22 1321)  O2 Device: None (Room air) (02/28/22 1106)  O2 Flow Rate (L/min): 3 l/min (02/24/22 1302)  ETCO2 (mmHg): 22 mmHg (02/24/22 1230)    Estimated body mass index is 27.6 kg/m² as calculated from the following:    Height as of this encounter: 5' 2\" (1.575 m). Weight as of this encounter: 68.4 kg (150 lb 14.4 oz). Intake/Output Summary (Last 24 hours) at 2/28/2022 1334  Last data filed at 2/28/2022 0919  Gross per 24 hour   Intake 360 ml   Output 2450 ml   Net -2090 ml         Physical Exam:   Blood pressure 112/71, pulse (!) 104, temperature 97.4 °F (36.3 °C), resp. rate 16, height 5' 2\" (1.575 m), weight 68.4 kg (150 lb 14.4 oz), SpO2 97 %. General:    Well nourished. No overt distress. Head:  Normocephalic, atraumatic  Eyes:  Sclerae appear normal.  Pupils equally round. ENT:  Nares appear normal, no drainage. Moist oral mucosa. Poor dentition. Neck:  No restricted ROM. Trachea midline   CV:   RRR. No m/r/g. No jugular venous distension. Lungs:   CTAB. No wheezing, rhonchi, or rales. Respirations even, unlabored. Abdomen: Bowel sounds present. Soft, nontender, nondistended. R percutaneous nephrostomy tube. Extremities: No cyanosis or clubbing. No edema  Skin:     No rashes and normal coloration. Warm and dry. Neuro:  CN II-XII grossly intact. Sensation intact. A&Ox3  Psych:  Normal mood and affect. I have reviewed ordered lab tests and independently visualized imaging below:    Recent Labs:  Recent Results (from the past 48 hour(s))   METABOLIC PANEL, BASIC    Collection Time: 02/27/22  5:10 AM   Result Value Ref Range    Sodium 140 136 - 145 mmol/L    Potassium 3.6 3.5 - 5.1 mmol/L    Chloride 104 98 - 107 mmol/L    CO2 31 21 - 32 mmol/L    Anion gap 5 (L) 7 - 16 mmol/L    Glucose 92 65 - 100 mg/dL    BUN 49 (H) 8 - 23 MG/DL    Creatinine 2.80 (H) 0.6 - 1.0 MG/DL    GFR est AA 22 (L) >60 ml/min/1.73m2    GFR est non-AA 18 (L) >60 ml/min/1.73m2    Calcium 9.2 8.3 - 10.4 MG/DL   HGB & HCT    Collection Time: 02/27/22  5:10 AM   Result Value Ref Range    HGB 8.6 (L) 11.7 - 15.4 g/dL    HCT 28.0 (L) 35.8 - 70.7 %   METABOLIC PANEL, BASIC    Collection Time: 02/28/22  5:00 AM   Result Value Ref Range    Sodium 140 136 - 145 mmol/L    Potassium 3.5 3.5 - 5.1 mmol/L    Chloride 103 98 - 107 mmol/L    CO2 34 (H) 21 - 32 mmol/L    Anion gap 3 (L) 7 - 16 mmol/L    Glucose 94 65 - 100 mg/dL    BUN 44 (H) 8 - 23 MG/DL    Creatinine 2.10 (H) 0.6 - 1.0 MG/DL    GFR est AA 30 (L) >60 ml/min/1.73m2    GFR est non-AA 25 (L) >60 ml/min/1.73m2    Calcium 9.2 8.3 - 10.4 MG/DL   HGB & HCT    Collection Time: 02/28/22  5:00 AM   Result Value Ref Range    HGB 8.3 (L) 11.7 - 15.4 g/dL    HCT 27.6 (L) 35.8 - 46.3 %   RBC, ALLOCATE    Collection Time: 02/28/22  9:15 AM   Result Value Ref Range    HISTORY CHECKED?  Historical check performed    TYPE & SCREEN    Collection Time: 02/28/22  9:39 AM   Result Value Ref Range    Crossmatch Expiration 03/03/2022,2359     ABO/Rh(D) O NEGATIVE     Antibody screen NEG     Unit number [id number]     Blood component type RC LR     Unit division 00     Status of unit ALLOCATED     Crossmatch result Compatible        All Micro Results     Procedure Component Value Units Date/Time    CULTURE, BLOOD [[id number]] Collected: 02/23/22 1558    Order Status: Completed Specimen: Blood Updated: 02/27/22 0638     Special Requests: --        RIGHT  ARM       Culture result: NO GROWTH 4 DAYS       CULTURE, BLOOD [[id number]] Collected: 02/23/22 1558    Order Status: Completed Specimen: Blood Updated: 02/27/22 0638     Special Requests: --        LEFT  FOREARM       Culture result: NO GROWTH 4 DAYS       CULTURE, URINE [[id number]] Collected: 02/23/22 1410    Order Status: Completed Specimen: Cath Urine Updated: 02/26/22 0704     Special Requests: NO SPECIAL REQUESTS        Culture result:       >100,000 COLONIES/mL NORMAL SKIN KAILASH ISOLATED          COVID-19 RAPID TEST [[id number]] Collected: 02/23/22 1819    Order Status: Completed Specimen: Nasopharyngeal Updated: 02/23/22 1931     Specimen source NASAL        COVID-19 rapid test Not detected        Comment:      The specimen is NEGATIVE for SARS-CoV-2, the novel coronavirus associated with COVID-19. A negative result does not rule out COVID-19. This test has been authorized by the FDA under an Emergency Use Authorization (EUA) for use by authorized laboratories. Fact sheet for Healthcare Providers: ConventionUpdate.co.nz  Fact sheet for Patients: ConventionUpdate.co.nz       Methodology: Isothermal Nucleic Acid Amplification               Other Studies:  No results found.     Current Meds:  Current Facility-Administered Medications   Medication Dose Route Frequency    0.9% sodium chloride infusion 250 mL  250 mL IntraVENous PRN    naloxegoL (MOVANTIK) tablet 12.5 mg  12.5 mg Oral DAILY    polyethylene glycol (MIRALAX) packet 17 g  17 g Oral BID PRN    senna (SENOKOT) tablet 17.2 mg  2 Tablet Oral BID PRN    HYDROcodone-acetaminophen (NORCO) 5-325 mg per tablet 1 Tablet  1 Tablet Oral Q4H PRN    0.9% sodium chloride infusion 250 mL  250 mL IntraVENous PRN    sodium chloride (NS) flush 5-10 mL  5-10 mL IntraVENous Q8H    sodium chloride (NS) flush 5-10 mL  5-10 mL IntraVENous PRN    sodium chloride (NS) flush 5-40 mL  5-40 mL IntraVENous Q8H    sodium chloride (NS) flush 5-40 mL  5-40 mL IntraVENous PRN    acetaminophen (TYLENOL) tablet 650 mg  650 mg Oral Q6H PRN    Or    acetaminophen (TYLENOL) suppository 650 mg  650 mg Rectal Q6H PRN    ondansetron (ZOFRAN ODT) tablet 4 mg  4 mg Oral Q8H PRN    Or    ondansetron (ZOFRAN) injection 4 mg  4 mg IntraVENous Q6H PRN       Signed:  Jason Orourke MD    Part of this note may have been written by using a voice dictation software. The note has been proof read but may still contain some grammatical/other typographical errors.

## 2022-03-01 NOTE — PERIOP NOTES
Patient drank Pre-Surgical drink as instructed:   2 Pre Surgical drinks before midnight and Pre Surgical drink consumed over 5-10 minutes PTA DOS     Warmer placed on patient's bed. Warm IV fluids infusing in pre-op per ERAS protocol.

## 2022-03-01 NOTE — PROGRESS NOTES
ERAS    Ideal body weight: 50.1 kg (110 lb 7.2 oz)     Lidocaine rate: 6.3 ml/hr  Dose: 1 mg/kg/hr based on ideal body weight

## 2022-03-01 NOTE — PROGRESS NOTES
Problem: Falls - Risk of  Goal: *Absence of Falls  Description: Document Felicitas Salas Fall Risk and appropriate interventions in the flowsheet.   Outcome: Progressing Towards Goal  Note: Fall Risk Interventions:  Mobility Interventions: Patient to call before getting OOB         Medication Interventions: Patient to call before getting OOB    Elimination Interventions: Call light in reach

## 2022-03-01 NOTE — PERIOP NOTES
TRANSFER - OUT REPORT:    Verbal report given to Joelle Coles RN on Natalie Borrero  being transferred to [address] [place] [address] for routine post - op       Report consisted of patients Situation, Background, Assessment and   Recommendations(SBAR). Information from the following report(s) SBAR, Procedure Summary, Intake/Output, MAR, Recent Results and Cardiac Rhythm SR was reviewed with the receiving nurse. Lines:   Quad Lumen Quad lumen 03/01/22 Right Internal jugular (Active)   Central Line Being Utilized Yes 03/01/22 6828   Criteria for Appropriate Use Limited/no vessel suitable for conventional peripheral access 03/01/22 0928   Site Assessment Clean, dry, & intact 03/01/22 0928   Infiltration Assessment 0 03/01/22 0928   Affected Extremity/Extremities Color distal to insertion site pink (or appropriate for race); Pulses palpable;Range of motion performed 03/01/22 0928   Dressing Status Clean, dry, & intact 03/01/22 0928   Dressing Type Disk with Chlorhexadine gluconate (CHG); Transparent 03/01/22 0928   Proximal Hub Color/Line Status White;Patent 03/01/22 0928   Medial 1 Hub Color/Line Status Patent; Infusing;Gray 03/01/22 0928   Medial 2 Hub Color/Line Status Blue;Patent 03/01/22 6226   Distal Hub Color/Line Status [place]; Infusing;Patent 03/01/22 0928       Peripheral IV 02/28/22 Anterior;Proximal;Right Forearm (Active)   Site Assessment Clean, dry, & intact 03/01/22 0928   Phlebitis Assessment 0 03/01/22 0928   Infiltration Assessment 0 03/01/22 0928   Dressing Status Clean, dry, & intact 03/01/22 0928   Dressing Type Transparent;Tape 03/01/22 0928       Peripheral IV 03/01/22 Left;Posterior Wrist (Active)   Site Assessment Clean, dry, & intact 03/01/22 0928   Phlebitis Assessment 0 03/01/22 0928   Infiltration Assessment 0 03/01/22 0928   Dressing Status Clean, dry, & intact 03/01/22 0928   Dressing Type Tape;Transparent 03/01/22 0928   Hub Color/Line Status Patent 03/01/22 0928       Arterial Line 03/01/22 Right Radial artery (Active)   Site Assessment Clean, dry, & intact 03/01/22 0928   Dressing Status Clean, dry, & intact 03/01/22 0928   Dressing Type Tape;Transparent 03/01/22 0928   Line Status Intact and in place 03/01/22 0928   Treatment Arm board on;Zeroed or re-zeroed 03/01/22 4104   Affected Extremity/Extremities Color distal to insertion site pink (or appropriate for race); Pulses palpable;Range of motion performed 03/01/22 3501        Opportunity for questions and clarification was provided. Patient transported with:   O2 @ 2 liters  Tech    VTE prophylaxis orders have been written for [employer].

## 2022-03-01 NOTE — ANESTHESIA POSTPROCEDURE EVALUATION
Procedure(s):  EX LAP/ LYSIS OF ADHESIONS/ OMENTAL FLAP. general    Anesthesia Post Evaluation      Multimodal analgesia: multimodal analgesia used between 6 hours prior to anesthesia start to PACU discharge  Patient location during evaluation: PACU  Patient participation: complete - patient participated  Level of consciousness: awake  Pain management: adequate  Airway patency: patent  Anesthetic complications: no  Cardiovascular status: acceptable  Respiratory status: acceptable, spontaneous ventilation and nonlabored ventilation  Hydration status: acceptable  Post anesthesia nausea and vomiting:  none      INITIAL Post-op Vital signs:   Vitals Value Taken Time   /60 03/01/22 1042   Temp 36.9 °C (98.5 °F) 03/01/22 0928   Pulse 77 03/01/22 1043   Resp 14 03/01/22 1020   SpO2 100 % 03/01/22 1043   Vitals shown include unvalidated device data.

## 2022-03-01 NOTE — ANESTHESIA PROCEDURE NOTES
Central Line Placement    Start time: 3/1/2022 7:42 AM  End time: 3/1/2022 8:00 AM  Performed by: James Price MD  Authorized by: James Price MD     Indications: vascular access  Preanesthetic Checklist: patient identified, risks and benefits discussed, anesthesia consent, patient being monitored and timeout performed    Timeout Time: 07:42 EST       Pre-procedure: All elements of maximal sterile barrier technique followed? Yes    2% Chlorhexidine for cutaneous antisepsis, Hand hygiene performed prior to catheter insertion and Ultrasound guidance    Sterile Ultrasound Technique followed?: Yes        Ultrasound Image Stored? Image stored    Procedure:   Prep:  ChloraPrep  Location: internal jugular  Orientation:  Right  Patient position:  Trendelenburg  Catheter type:  Quad lumen  Catheter size:  8.5 Fr  Catheter length:  20 cm (secured at 15 cm at skin)  Number of attempts:  1  Successful placement: Yes      Assessment:   Post-procedure:  Catheter secured and sterile dressing with CHG applied  Assessment:  Free fluid flow, blood return through all ports and guidewire removal verified  Insertion:  Uncomplicated  Patient tolerance:  Patient tolerated the procedure well with no immediate complications  Internal Jugular CVC- inserted after the induction of general anesthesia    Right internal jugular CVC: Risks, benefits, alternatives explained and pt agrees to proceed. Pt positioned in Trendelenburg. Sterile prep with chlorhexidine and full body drape. Right internal jugular vein on first stick with real time ultrasound guidance. Veinous cannulation confirmed with manometry and visualization of wire in vein on ultrasound. Easy introducer and Catheter insertion to  15 centimeters. Sterile tegaderm applied. No complications. CXR to be performed after case. Seven step protocol followed. Potential vascular access sites examined with ultrasound and an acceptable, patent site selected as noted above. Needle path and vascular access visualized in real time using ultrasound. As noted above, a confirmatory image is permanently stored in the chart.

## 2022-03-01 NOTE — ANESTHESIA PROCEDURE NOTES
Arterial Line Placement    Start time: 3/1/2022 7:23 AM  End time: 3/1/2022 7:33 AM  Performed by: Cheryl Simons CRNA  Authorized by: Carletta Cowden, MD     Pre-Procedure  Indications:  Arterial pressure monitoring and blood sampling  Preanesthetic Checklist: patient identified, risks and benefits discussed, anesthesia consent, site marked, patient being monitored, timeout performed and patient being monitored    Timeout Time: 07:23 EST        Procedure:   Prep:  ChloraPrep  Seldinger Technique?: Yes    Orientation:  Right  Location:  Radial artery  Catheter size:  20 G  Number of attempts:  1  Cont Cardiac Output Sensor: No      Assessment:   Post-procedure:  Line secured and sterile dressing applied  Patient Tolerance:  Patient tolerated the procedure well with no immediate complications

## 2022-03-01 NOTE — ANESTHESIA PROCEDURE NOTES
Peripheral Block    Start time: 3/1/2022 7:30 AM  End time: 3/1/2022 7:33 AM  Performed by: Agapito Tsang MD  Authorized by: Agapito Tsang MD       Pre-procedure:    Indications: at surgeon's request and post-op pain management    Preanesthetic Checklist: patient identified, risks and benefits discussed, anesthesia consent given and patient being monitored    Timeout Time: 07:30 EST          Block Type:   Block Type:  TAP  Laterality:  Left  Monitoring:  Standard ASA monitoring, continuous pulse ox, oxygen, frequent vital sign checks and heart rate  Injection Technique:  Single shot  Procedures: ultrasound guided    Patient Position: supine  Prep: chlorhexidine    Location:  Abdominal  Needle Type:  Stimuplex  Needle Gauge:  20 G  Needle Localization:  Ultrasound guidance  Medication Injected:  Bupivacaine 0.25%, 30 mL  dexamethasone (DECADRON) 4 mg/mL injection, 4 mg  Med Admin Time: 3/1/2022 7:33 AM    Assessment:  Number of attempts:  1  Injection Assessment:  Incremental injection every 5 mL, ultrasound image on chart, no intravascular symptoms and negative aspiration for blood (no violation of peritoneal cavity)  Patient tolerance:  Patient tolerated the procedure well with no immediate complications

## 2022-03-01 NOTE — OP NOTES
[facility]  OPERATIVE REPORT    Name:  Naga Goyal  MR#:  [id number]  :  1950  ACCOUNT #:  [de-identified]  DATE OF SERVICE:  2022    PREOPERATIVE DIAGNOSIS:  Invasive bladder cancer. POSTOPERATIVE DIAGNOSIS:  Invasive bladder cancer. PROCEDURE PERFORMED:  Exploratory laparotomy with lysis of adhesions and creation of omental flap. SURGEON:  Etta Jean Baptiste MD    ASSISTANT:  Farnaz Gustafson    ANESTHESIA:  General endotracheal.    COMPLICATIONS:  None. SPECIMENS REMOVED:  Bladder tumor from mid cephalad bladder extending through peritoneal surface. IMPLANTS:  None. ESTIMATED BLOOD LOSS:  Less than 50 mL. FINDINGS:  On exploratory laparotomy, the patient's bladder was found to be fixed anteriorly as well as to the right and left pelvic sidewall. There was a 3-cm tumor nodule extending through the cephalad portion of the bladder and involving the sigmoid mesentery. Likewise, there was approximately 2 cm extension of tumor from the posterior bladder involving the rectal mesentery and side wall. It was determined the bladder was not safely resectable. The patient already has a nephrostomy tube in place and we elected not to perform a urinary diversion at this time. DETAILS OF THE PROCEDURE:  After informed consent was obtained, the patient was taken to OR room 2 in [facility]. [facility]. After induction of general anesthesia and placement of an endotracheal tube, she had a central line placed. A time-out was performed. She was placed in low lithotomy and her genitalia and abdomen were prepped and draped in the usual sterile fashion. She was given Rocephin as a prophylactic antibiotic. We inserted a Wallace catheter and there was quite a bit of tumor and blood debris that came from the bladder. An incision was made in the midline extending from the pubic symphysis up around the left portion of the umbilicus.   Electrocautery was used to take this down through the subcutaneous tissue and the rectus fascia was opened in the midline. It then became apparent it was very difficult to develop her space of Retzius. The bladder was fixed anteriorly to the undersurface of the abdominal wall. I then sharply opened the peritoneum and discovered quite a few adhesions of bowel and omentum to the cephalad and posterior portion of the bladder. We carefully lysed these adhesions with piero and electrocautery. It then became obvious there was a large (3 cm) extension of tumor out the cephalad bladder through the peritoneal surface and adherent to the sigmoid mesentery. I was able to sharply divide this. A portion of this tumor was sent for Pathology. Once the bowel was released as well as the omentum, I was able to palpate posteriorly between the rectum and the bladder. There was extension of tumor in this posterior area as well that was sharply divided. It was approximately 2 cm in size and was adherent to the mesentery of the rectum as well as the rectal sidewall. I then was able to palpate the entire bladder. It was clearly full of tumor. It appeared fixed to the left sidewall and right pelvic sidewall. The bladder was completely immobile. At this point, I made a determination it was not safe to move forward with resection of the bladder. I was concerned for excessive blood loss and the likelihood there would be positive margins. I then explored her abdomen. The liver felt soft. There was no evidence of peritoneal seeding or studding. There were no palpably enlarged pelvic nodes. At this time, because she already had a nephrostomy tube in place, I elected not to perform urinary diversion. We are going to recommend systematic therapy and then depending on how she responds, may be reconsider resection and/or diversion. We irrigated the pelvis. I then created an omental flap using the LigaSure device.   We made a J-type flap that was able to reach all the way into the pelvis and divide the posterior bladder from the rectal involvement. Once we ensured there was good hemostasis, the anterior abdominal wall was closed with #1 PDS as well as interrupted #1 Vicryls. Subcuticular tissue was closed with 2-0 Vicryl. Skin edges were approximated with 4-0 Monocryl. Sterile dressings were placed. The patient was awakened, extubated, and taken to the recovery room in stable condition. There were no known complications. All counts were correct. DISPOSITION:  She will be admitted back to the surgical floor. We will consult Oncology team and get her started on systematic therapy.       Doug Caputo MD      [id number]  D:  03/01/2022 9:07  T:  03/01/2022 12:09  JOB #:  [id number]

## 2022-03-01 NOTE — BRIEF OP NOTE
Brief Postoperative Note    Patient: Nolvia Hale  YOB: 1950  MRN: [id number]    Date of Procedure: 3/1/2022     Pre-Op Diagnosis: Cancer of overlapping sites of bladder ([facility] [address] [C67.8]    Post-Op Diagnosis: Same as preoperative diagnosis. Extravesical disease involving sigmoid and rectal mesentery. Bladder fixed anteriorly and to both pelvic sidewalls    Procedure(s):  Exploratory lap, ALINE, omental flap    Surgeon(s):  Nelson Terry MD    Surgical Assistant: None    Anesthesia: General     Estimated Blood Loss (mL): less than 50     Complications: None    Specimens:   ID Type Source Tests Collected by Time Destination   1 : Tumor extending from cephalad to bladder  Preservative Abdomen  Nelson Terry MD 3/1/2022 0831 Pathology        Implants: * No implants in log *    Drains:   Nephrostomy Tube 02/24/22 (Active)   Site Assessment Clean, dry, & intact 03/01/22 0300   Dressing Status Clean, dry, & intact 03/01/22 0300   Drainage Description Yellow 03/01/22 0300   Status Draining 03/01/22 0300   Urine Output (mL) 450 ml 02/28/22 2205       Findings: See above-- fixed bladder to pelvic sidewall; extravesical extension in cephalad and posterior bladder involving mesentery.      Electronically Signed by David Li MD on 3/1/2022 at 8:48 AM

## 2022-03-01 NOTE — PERIOP NOTES
Lidocaine drip pump settings confirmed at Ideal body weight: 50.1 kg (110 lb 7.2 oz). Infusing at 6.25 ml/hour.

## 2022-03-01 NOTE — PROGRESS NOTES
Hospitalist Progress Note   Admit Date:  2022  1:09 PM   Name:  Geovanni Ibrahim   Age:  73 y.o. Sex:  female  :  1950   MRN:  [id number]   Room:  [facility]    Presenting Complaint: Abdominal Pain    Reason(s) for Admission: [facility] renal failure (ARF) ([facility] [N17.9]  Bladder cancer [facility]) [C67.9]     Hospital Course & Interval History:   Mrs. 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 She was recently diagnosed with a bladder tumor and underwent cystoscopy with TURBT in December which showed squamous cell carcinoma with extensive necrosis. She had repeat TURBT 21 for debulking. She had recently opted to proceed with radical cystectomy and ileal conduit. She was referred to Cardiology for perioperative risk assessment and routine labs showed hypokalemia and RA so she was referred to the hospital where labs showed K 3.1 and sCr 7.3. She has largely been asymptomatic aside from abdominal pain that has been chronic. Did have oliguria. CT a/p showed R sided hydronephrosis and hydroureter with extrinsic compression from her bladder tumor. Urology consulted who recommended admission and IR consult for percutaneous nephrostomy tube placement. She was started on abx for acute cystitis. Hb on the morning of  dropped from 7.9 to 5.8 though no clinical bleeding noted. Perc nephrostomy placed  with good urine output. Cr improved. Planned for radical cystectomy on 3/1, however instead she underwent ex-lap with ALINE and omental flap creation. Subjective/24hr Events (22): Back from surgery, hungry. Abdo pain is currently controlled. Cr down to 1.5 today. Family at bedside. She feels well. No chest pain or SOB. ROS:  10 systems reviewed and negative except as noted above.      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 Percutaneous nephrostomy placed 2/24, ongoing urine output and Cr has improved to 1.5 on 3/1.   - S/p ex-lap on 3/1 with ALINE and creation of omental flap, bladder was immobile so she did not undergo cystectomy. No urinary diversion since she already had perc nephrostomy tube.     # Iron deficiency anemia              - Baseline Hb ~8 since January, did drop to 5.8 and was transfused, though no clinical bleeding or hematoma, etc noted on CT at admission. PPI was stopped. Hb has since been stable.     # Constipation              - Resolved. Bowel regimen now PRN.    # HypoK              - URLOKZWE.     # Acute uncomplicated cystitis/pyuria              - Con't Rocephin. Culture neg to date.     # Squamous cell carcinoma of the bladder              - S/p TURBT x2. Radical cystectomy on Tues 3/1     # H/o tobacco abuse              - Quit ~20 years ago    Dispo/Discharge Planning: Hopefully home soon. Diet:  ADULT DIET Regular; Low Potassium (Less than 3000 mg/day)  ADULT ORAL NUTRITION SUPPLEMENT AM Snack, PM Snack; Other Supplement;  Ensure Surgery Immunonutrition- starting POD 1 - POD 5  DVT PPx: SCDs, ambulation  Code status: Full Code    Hospital Problems as of 3/1/2022 Date Reviewed: 2/22/2022          Codes Class Noted - Resolved POA    * (Principal) Acute [facility] failure ([facility]F) [facility] ICD-10-CM: N17.9  ICD-9-CM: 584.9  2/23/2022 - Present Yes        Kidney congenitally absent, left ICD-10-CM: Q60.0  ICD-9-CM: 753.0  2/23/2022 - Present Yes        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 ([facility] ICD-10-CM: N13.30, C67.9  ICD-9-CM: 591, 188.9  2/23/2022 - Present Yes        Systolic CHF, chronic (HCC) ICD-10-CM: I50.22  ICD-9-CM: 428.22, 428.0  2/2/2022 - Present Yes    Overview Signed 2/2/2022  8:06 AM by Chris Bear MD     Echo 2019 [place] 45% with no severe valvular pathology             THAO (obstructive sleep apnea) ICD-10-CM: G47.33  ICD-9-CM: 327.23  2/2/2022 - Present Yes        Hematuria ICD-10-CM: R31.9  ICD-9-CM: 599.70  12/30/2021 - Present Yes        Bladder cancer ([facility] [address] [facility] ICD-10-CM: C67.9  ICD-9-CM: 188.9  12/30/2021 - Present Yes        RESOLVED: Constipation ICD-10-CM: K59.00  ICD-9-CM: 564.00  2/27/2022 - 2/27/2022 Unknown        RESOLVED: Hypokalemia ICD-10-CM: E87.6  ICD-9-CM: 276.8  2/23/2022 - 2/27/2022 Yes        RESOLVED: Acute UTI ICD-10-CM: N39.0  ICD-9-CM: 599.0  2/23/2022 - 2/27/2022 Yes              Objective:     Patient Vitals for the past 24 hrs:   Temp Pulse Resp BP SpO2   03/01/22 1130 97.4 °F (36.3 °C) 77 16 131/69 94 %   03/01/22 1105 98.6 °F (37 °C) 86 16 137/62 99 %   03/01/22 1100  79 16  99 %   03/01/22 1055  70 16  97 %   03/01/22 1054   16     03/01/22 1050  77 16 128/60 93 %   03/01/22 1045  67 18 126/60 99 %   03/01/22 1040  77 18  99 %   03/01/22 1035  76 16 124/60 (!) 87 %   03/01/22 1034   16     03/01/22 1030  95 16  100 %   03/01/22 1025  64 16 120/60 98 %   03/01/22 1020  82 14  99 %   03/01/22 1015  68 14 (!) 126/58 99 %   03/01/22 1010  88 14  98 %   03/01/22 1009  86 14 (!) 141/67    03/01/22 1005  64 14  99 %   03/01/22 1000  70 14 127/60 99 %   03/01/22 0955  81 14 127/60 98 %   03/01/22 0950  82 16  98 %   03/01/22 0945  90 16  98 %   03/01/22 0940  93 16  98 %   03/01/22 0935  92 16  97 %   03/01/22 0930  (!) 103 16 (!) 125/59 96 %   03/01/22 0928 98.5 °F (36.9 °C) (!) 101 16 132/63 97 %   03/01/22 0927 98.5 °F (36.9 °C) (!) 101 16 132/63 97 %   03/01/22 0556 98.4 °F (36.9 °C) 97 16 131/62 99 %   03/01/22 0530 98.4 °F (36.9 °C) 98 18 (!) 98/51 95 %   03/01/22 0010 98.4 °F (36.9 °C) 88 18 (!) 95/58 96 %   02/28/22 1826 98 °F (36.7 °C) (!) 104 18 (!) 105/54 96 %   02/28/22 1733 98.3 °F (36.8 °C) (!) 102 20 113/71 93 %   02/28/22 1621 98.2 °F (36.8 °C) 100 18 107/60 96 %   02/28/22 1551 98.3 °F (36.8 °C) (!) 105 18 (!) 113/59 95 %   02/28/22 1509 97.8 °F (36.6 °C) 98 16 110/67 99 %     Oxygen Therapy  O2 Sat (%): 94 % (03/01/22 1130)  Pulse via Oximetry: 77 beats per minute (03/01/22 1105)  O2 Device: Nasal cannula (03/01/22 1105)  Skin Assessment: Clean, dry, & intact (03/01/22 1105)  O2 Flow Rate (L/min): 2 l/min (03/01/22 1105)  ETCO2 (mmHg): 22 mmHg (02/24/22 1230)    Estimated body mass index is 26.5 kg/m² as calculated from the following:    Height as of this encounter: 5' 2\" (1.575 m). Weight as of this encounter: 65.7 kg (144 lb 14.4 oz). Intake/Output Summary (Last 24 hours) at 3/1/2022 1422  Last data filed at 3/1/2022 1110  Gross per 24 hour   Intake 1184 ml   Output 1730 ml   Net -546 ml         Physical Exam:   Blood pressure 131/69, pulse 77, temperature 97.4 °F (36.3 °C), resp. rate 16, height 5' 2\" (1.575 m), weight 65.7 kg (144 lb 14.4 oz), SpO2 94 %. General:    Well nourished. No overt distress. Head:  Normocephalic, atraumatic  Eyes:  Sclerae appear normal.  Pupils equally round. ENT:  Nares appear normal, no drainage. Dry oral mucosa  Neck:  No restricted ROM. Trachea midline   CV:   RRR. No m/r/g. No jugular venous distension. Lungs:   CTAB. No wheezing, rhonchi, or rales. Respirations even, unlabored. Abdomen: Bowel sounds present. Soft, nontender, nondistended. Surgical dressings present, clean and dry. R sided nephrostomy tube. Extremities: No cyanosis or clubbing. No edema  Skin:     No rashes and normal coloration. Warm and dry. Neuro:  CN II-XII grossly intact. Sensation intact. A&Ox3  Psych:  Normal mood and affect.       I have reviewed ordered lab tests and independently visualized imaging below:    Recent Labs:  Recent Results (from the past 48 hour(s))   METABOLIC PANEL, BASIC    Collection Time: 02/28/22  5:00 AM   Result Value Ref Range    Sodium 140 136 - 145 mmol/L    Potassium 3.5 3.5 - 5.1 mmol/L    Chloride 103 98 - 107 mmol/L    CO2 34 (H) 21 - 32 mmol/L    Anion gap 3 (L) 7 - 16 mmol/L Glucose 94 65 - 100 mg/dL    BUN 44 (H) 8 - 23 MG/DL    Creatinine 2.10 (H) 0.6 - 1.0 MG/DL    GFR est AA 30 (L) >60 ml/min/1.73m2    GFR est non-AA 25 (L) >60 ml/min/1.73m2    Calcium 9.2 8.3 - 10.4 MG/DL   HGB & HCT    Collection Time: 02/28/22  5:00 AM   Result Value Ref Range    HGB 8.3 (L) 11.7 - 15.4 g/dL    HCT 27.6 (L) 35.8 - 46.3 %   RBC, ALLOCATE    Collection Time: 02/28/22  9:15 AM   Result Value Ref Range    HISTORY CHECKED? Historical check performed    TYPE & SCREEN    Collection Time: 02/28/22  9:39 AM   Result Value Ref Range    Crossmatch Expiration 03/03/2022,2359     ABO/Rh(D) O NEGATIVE     Antibody screen NEG     Unit number [id number]     Blood component type [facility]     Unit division 00     Status of unit TRANSFUSED     Crossmatch result Compatible     Unit number [id number]     Blood component type [facility]     Unit division 00     Status of unit ALLOCATED     Crossmatch result Compatible    CBC W/O DIFF    Collection Time: 03/01/22  4:40 AM   Result Value Ref Range    WBC 8.1 4.3 - 11.1 K/uL    RBC 3.77 (L) 4.05 - 5.2 M/uL    HGB 9.5 (L) 11.7 - 15.4 g/dL    HCT 31.2 (L) 35.8 - 46.3 %    MCV 82.8 79.6 - 97.8 FL    MCH 25.2 (L) 26.1 - 32.9 PG    MCHC 30.4 (L) 31.4 - 35.0 g/dL    RDW 16.6 (H) 11.9 - 14.6 %    PLATELET 075 475 - 286 K/uL    MPV 9.1 (L) 9.4 - 12.3 FL    ABSOLUTE NRBC 0.00 0.0 - 0.2 K/uL   METABOLIC PANEL, BASIC    Collection Time: 03/01/22  4:40 AM   Result Value Ref Range    Sodium 143 136 - 145 mmol/L    Potassium 3.4 (L) 3.5 - 5.1 mmol/L    Chloride 105 98 - 107 mmol/L    CO2 32 21 - 32 mmol/L    Anion gap 6 (L) 7 - 16 mmol/L    Glucose 98 65 - 100 mg/dL    BUN 30 (H) 8 - 23 MG/DL    Creatinine 1.50 (H) 0.6 - 1.0 MG/DL    GFR est AA 44 (L) >60 ml/min/1.73m2    GFR est non-AA 37 (L) >60 ml/min/1.73m2    Calcium 8.8 8.3 - 10.4 MG/DL   RBC, ALLOCATE    Collection Time: 03/01/22  6:00 AM   Result Value Ref Range    HISTORY CHECKED?  Historical check performed    RBC, ALLOCATE Collection Time: 03/01/22  8:30 AM   Result Value Ref Range    HISTORY CHECKED? Historical check performed        All Micro Results     Procedure Component Value Units Date/Time    CULTURE, BLOOD [[id number]] Collected: 02/23/22 1558    Order Status: Completed Specimen: Blood Updated: 02/28/22 1435     Special Requests: --        RIGHT  ARM       Culture result: NO GROWTH 5 DAYS       CULTURE, BLOOD [[id number]] Collected: 02/23/22 1558    Order Status: Completed Specimen: Blood Updated: 02/28/22 1435     Special Requests: --        LEFT  FOREARM       Culture result: NO GROWTH 5 DAYS       CULTURE, URINE [[id number]] Collected: 02/23/22 1410    Order Status: Completed Specimen: Cath Urine Updated: 02/26/22 0704     Special Requests: NO SPECIAL REQUESTS        Culture result:       >100,000 COLONIES/mL NORMAL SKIN KAILASH ISOLATED          COVID-19 RAPID TEST [[id number]] Collected: 02/23/22 1819    Order Status: Completed Specimen: Nasopharyngeal Updated: 02/23/22 1931     Specimen source NASAL        COVID-19 rapid test Not detected        Comment:      The specimen is NEGATIVE for SARS-CoV-2, the novel coronavirus associated with COVID-19. A negative result does not rule out COVID-19. This test has been authorized by the FDA under an Emergency Use Authorization (EUA) for use by authorized laboratories. Fact sheet for Healthcare Providers: ConventionUpdate.co.nz  Fact sheet for Patients: ConventionUpdate.co.nz       Methodology: Isothermal Nucleic Acid Amplification               Other Studies:  XR CHEST PORT    Result Date: 3/1/2022  Exam: XR CHEST PORT on 3/1/2022 10:03 AM Clinical History: The Female patient is 73years old  presenting for Central line placement. Comparison:  Chest x-ray 12/3/2021 Findings:  Frontal view of the chest was obtained. Right jugular central line has been placed with tip at the right atrial/superior caval junction. . Shallow start changes are seen. There is right subdiaphragmatic, by history postoperative. The cardiomediastinal silhouette is within normal limits. There are no acute osseous abnormalities. 1. Satisfactory placement of right jugular central line. 2. Postoperative subdiaphragmatic free air.  CPT code(s) 20269       Current Meds:  Current Facility-Administered Medications   Medication Dose Route Frequency    lactated Ringers infusion  75 mL/hr IntraVENous CONTINUOUS    lidocaine (XYLOCAINE) 10 mg/mL (1 %) injection 0.1 mL  0.1 mL SubCUTAneous PRN    sodium chloride (NS) flush 5-40 mL  5-40 mL IntraVENous Q8H    sodium chloride (NS) flush 5-40 mL  5-40 mL IntraVENous Q8H    sodium chloride (NS) flush 5-40 mL  5-40 mL IntraVENous PRN    HYDROmorphone (DILAUDID) injection 0.5 mg  0.5 mg IntraVENous Q4H PRN    naloxone (NARCAN) injection 0.4 mg  0.4 mg IntraVENous PRN    aspirin chewable tablet 81 mg  81 mg Oral DAILY    lactated Ringers infusion  25 mL/hr IntraVENous CONTINUOUS    acetaminophen (TYLENOL) tablet 1,000 mg  1,000 mg Oral Q6H    diphenhydrAMINE (BENADRYL) capsule 25 mg  25 mg Oral Q6H PRN    gabapentin (NEURONTIN) capsule 300 mg  300 mg Oral TID    oxyCODONE IR (ROXICODONE) tablet 5 mg  5 mg Oral Q4H PRN    fat emulsion 20% (LIPOSYN, INTRAlipid) infusion  0.25-1.5 mL/kg/min (Ideal) IntraVENous PRN    ondansetron (ZOFRAN ODT) tablet 4 mg  4 mg Oral Q4H PRN    heparin (porcine) injection 5,000 Units  5,000 Units SubCUTAneous Q8H    lidocaine 8 mg/mL (Xylocaine) infusion  1 mg/kg/hr (Ideal) IntraVENous CONTINUOUS    naloxegoL (MOVANTIK) tablet 12.5 mg  12.5 mg Oral DAILY    polyethylene glycol (MIRALAX) packet 17 g  17 g Oral BID PRN    senna (SENOKOT) tablet 17.2 mg  2 Tablet Oral BID PRN    0.9% sodium chloride infusion 250 mL  250 mL IntraVENous PRN    sodium chloride (NS) flush 5-40 mL  5-40 mL IntraVENous Q8H    ondansetron (ZOFRAN) injection 4 mg  4 mg IntraVENous Q6H PRN       Signed:  Moris Ruth Gisell Meyers MD    Part of this note may have been written by using a voice dictation software. The note has been proof read but may still contain some grammatical/other typographical errors.

## 2022-03-01 NOTE — ADDENDUM NOTE
Addendum  created 03/01/22 1112 by Taya Vidales, CRNA    Intraprocedure Meds edited, Orders acknowledged in Narrator

## 2022-03-01 NOTE — PROGRESS NOTES
Spiritual Care visit. Initial Visit, Pre Surgery Consult. Visit and prayer before patient goes to surgery.     Visit by Bharat Ace M.Ed., Th.B. ,Staff

## 2022-03-01 NOTE — PROGRESS NOTES
Pt has been up in the chair most of the afternoon. Tolerating IV fluids without difficulty. Denies any pain/discomfort thus far during shift. Nephrostomy tube draining clear yellow urine. Wallace catheter draining blood with clots. Pt denies needs at this time. Bed in low position, locked and call light/personal items within reach. Will continue to monitor and report to night shift nurse.

## 2022-03-01 NOTE — ANESTHESIA PROCEDURE NOTES
Peripheral Block    Start time: 3/1/2022 7:33 AM  End time: 3/1/2022 7:36 AM  Performed by: James Price MD  Authorized by: James Price MD       Pre-procedure:    Indications: at surgeon's request and post-op pain management    Preanesthetic Checklist: patient identified, risks and benefits discussed, anesthesia consent given and patient being monitored    Timeout Time: 07:30 EST          Block Type:   Block Type:  TAP  Laterality:  Right  Monitoring:  Standard ASA monitoring, continuous pulse ox, oxygen, frequent vital sign checks and heart rate  Injection Technique:  Single shot  Procedures: ultrasound guided    Patient Position: supine  Prep: chlorhexidine    Location:  Abdominal  Needle Type:  Stimuplex  Needle Gauge:  20 G  Needle Localization:  Ultrasound guidance  Medication Injected:  Bupivacaine 0.25%, 30 mL  dexamethasone (DECADRON) 4 mg/mL injection, 4 mg  Med Admin Time: 3/1/2022 7:36 AM    Assessment:  Number of attempts:  1  Injection Assessment:  Incremental injection every 5 mL, ultrasound image on chart, no intravascular symptoms and negative aspiration for blood (no violation of peritoneal cavity)  Patient tolerance:  Patient tolerated the procedure well with no immediate complications

## 2022-03-01 NOTE — PROGRESS NOTES
TRANSFER - OUT REPORT:    Verbal report given to Jenny Duran RN(name) on Natalie Borrero  being transferred to PreOp(unit) for ordered procedure       Report consisted of patients Situation, Background, Assessment and   Recommendations(SBAR). Information from the following report(s) SBAR was reviewed with the receiving nurse. Lines:   Peripheral IV 02/28/22 Anterior;Proximal;Right Forearm (Active)   Site Assessment Clean, dry, & intact 03/01/22 0300   Phlebitis Assessment 0 03/01/22 0300   Infiltration Assessment 0 03/01/22 0300   Dressing Status Clean, dry, & intact 03/01/22 0300   Dressing Type Transparent;Tape 03/01/22 0300        Opportunity for questions and clarification was provided.       Patient transported with:   Registered Nurse

## 2022-03-02 NOTE — WOUND CARE
Patient seen for follow up. Noted surgery yesterday and ilieal conduit was not placed. Patient tearful, encourage patient. Answered all questions. Will plan to sign off, please call if needed further.

## 2022-03-02 NOTE — PROGRESS NOTES
ERAS  POD 1    Patient sitting up in bed. No c/o pain at present. Assisted patient to bathroom for daily Hibiclens wash. Patient states not able to walk today. Encouraged to take a few steps. Able to ambulate to the doorway, then patient becomes tearful and shaky to all extremities saying she can't walk. Assisted patient to sit in chair. Redirected patient with deep breathing exercise and then able to relax and ambulate back to recliner. Instructed to continue using IS 10 times every hour while awake. Patient demonstrated use (750 ml total volume). Boyfriend and friend arrived, sitting at bedside.

## 2022-03-02 NOTE — PROGRESS NOTES
Pt is stable with bed in lowest position, wheels locked, and call light within reach. Hourly rounds completed. VSS. IV is patent and dressing is clean, dry, and intact. Pt's ERAS scheduled for day 1 night shift has been completed. Report to be given to day shift nurse.

## 2022-03-02 NOTE — PROGRESS NOTES
Admit Date: 2/23/2022    Subjective:     Misty Escalera is awake, alert and talkative. She is tearful today. She reports she is tolerating food, having BMs, ambulating consistently. Pain controlled. Wallace in place with bloody urine with sediment. NT in place with clear will urine.       Objective:     Patient Vitals for the past 8 hrs:   BP Temp Pulse Resp SpO2 Weight   03/02/22 0825 (!) 144/68 97.5 °F (36.4 °C) 76 18 97 %    03/02/22 0539 120/77 97.3 °F (36.3 °C) 79 18 100 % 132 lb 9.6 oz (60.1 kg)     03/02 0701 - 03/02 1900  In: 240 [P.O.:240]  Out: -   02/28 1901 - 03/02 0700  In: 9968 [I.V.:1184]  Out: 3806 [Urine:3230]    Physical Exam:  GENERAL: alert, cooperative, no distress  LUNG: clear to auscultation bilaterally  HEART: regular rate and rhythm, S1, S2  ABDOMEN: soft, non-tender, dressing cdi   NEUROLOGIC: AOx3    Data Review   Recent Results (from the past 24 hour(s))   CBC W/O DIFF    Collection Time: 03/02/22  5:22 AM   Result Value Ref Range    WBC 10.9 4.3 - 11.1 K/uL    RBC 3.21 (L) 4.05 - 5.2 M/uL    HGB 8.1 (L) 11.7 - 15.4 g/dL    HCT 27.0 (L) 35.8 - 46.3 %    MCV 84.1 79.6 - 97.8 FL    MCH 25.2 (L) 26.1 - 32.9 PG    MCHC 30.0 (L) 31.4 - 35.0 g/dL    RDW 16.5 (H) 11.9 - 14.6 %    PLATELET 365 153 - 109 K/uL    MPV 8.9 (L) 9.4 - 12.3 FL    ABSOLUTE NRBC 0.00 0.0 - 0.2 K/uL   METABOLIC PANEL, BASIC    Collection Time: 03/02/22  5:22 AM   Result Value Ref Range    Sodium 141 136 - 145 mmol/L    Potassium 4.0 3.5 - 5.1 mmol/L    Chloride 106 98 - 107 mmol/L    CO2 33 (H) 21 - 32 mmol/L    Anion gap 2 (L) 7 - 16 mmol/L    Glucose 122 (H) 65 - 100 mg/dL    BUN 24 (H) 8 - 23 MG/DL    Creatinine 1.30 (H) 0.6 - 1.0 MG/DL    GFR est AA 52 (L) >60 ml/min/1.73m2    GFR est non-AA 43 (L) >60 ml/min/1.73m2    Calcium 8.8 8.3 - 10.4 MG/DL   MAGNESIUM    Collection Time: 03/02/22  5:22 AM   Result Value Ref Range    Magnesium 1.7 (L) 1.8 - 2.4 mg/dL   PHOSPHORUS    Collection Time: 03/02/22  5:22 AM Result Value Ref Range    Phosphorus 2.9 2.3 - 3.7 MG/DL       Assessment:     Principal Problem:    Acute renal failure (ARF) ([address] (2/23/2022)    Active Problems:    Hematuria (12/30/2021)      Bladder cancer ([address] (38/32/5691)      Systolic CHF, chronic (HCC) (2/2/2022)      Overview: Echo 2019 [place] 45% with no severe valvular pathology      THAO (obstructive sleep apnea) (2/2/2022)      Kidney congenitally absent, left (2/23/2022)      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 ([address] (2/23/2022)        S/P:  POSTOPERATIVE DIAGNOSIS:  Invasive bladder cancer.     PROCEDURE PERFORMED:  Exploratory laparotomy with lysis of adhesions and creation of omental flap. 3/1    Findings: On exploratory laparotomy, the patient's bladder was found to be fixed anteriorly as well as to the right and left pelvic sidewall. There was a 3-cm tumor nodule extending through the cephalad portion of the bladder and involving the sigmoid mesentery. Likewise, there was approximately 2 cm extension of tumor from the posterior bladder involving the rectal mesentery and side wall. It was determined the bladder was not safely resectable. The patient already has a nephrostomy tube in place and we elected not to perform a urinary diversion at this time. Cr improved at 1.3  Hgb 8.1, Hct 27.0  Afebrile, VSS    Plan:     Await oncology consult today to discuss systemic therapy. Remove ventura today. Continue NT to drainage. Continue to ambulate. Continue regular diet. Likely d/c home tomorrow.        Sarah Valadez NP  [facility]

## 2022-03-02 NOTE — PROGRESS NOTES
CM met with patient to discuss discharge planning. Patient evaluated by PT this day and recommended [facility] therapy. CM discussed [facility] services with patient. Patient nicely declined [facility] services and plans [place] [facility] [place] other at discharge. Patient is requesting SW CM assist with a rolling walker only. DME Order to be submitted to [place] [id number].    No additional CM needs expressed at this time. CM following.

## 2022-03-02 NOTE — MANAGEMENT PLAN
[facility] Hematology & Oncology        Inpatient Hematology / Oncology Plan of Care    Reason for Consult:  Acute renal failure (ARF) ([facility] [N17.9]  Bladder cancer [facility]) [C67.9]  Referring Physician:  Patricia Bocanegra MD    History of Present Illness:  Ms. Homero Garcia is a 73 y.o. female admitted on 2/23/2022. The primary encounter diagnosis was Acute renal failure, unspecified acute renal failure type ([facility] Diagnoses of Hydronephrosis due to obstruction of bladder, Malignant neoplasm of urinary bladder, unspecified site [facility]), Urinary tract infection with hematuria, site unspecified, and Malignant neoplasm of overlapping sites of bladder [facility]) were also pertinent to this visit. Ms Homero Garcia has a PMH of CHF (EF 45-50%), sleep apnea and congenitally absent left kidney. She has a significant smoking history but does not currently smoke. She is a patient of Dr Toan Curtis. She initially saw urology for urinary symptoms and ultimately underwent CT AP in 11/2021 that indicated 7 cm bladder mass arising from anterior R lateral wall and diffuse urothelial enhancement as well as 1.7cm R pelvic mass concerning for lymph node. CT chest negative and no other area of disease. She underwent TURBT on 12/3/21 and path indicated superficially invasive keratinizing squamous cell with extensive necrosis. She was referred to Dr Toan Curtis and then underwent repeat TURBT for debulking on 12/30 and findings noted a large bladder tumor involving right trigone/sidewall/anterior bladder neck and unable to be resected as it was clearly invasive. Bladder also noted to be fixed to right vaginal wall or pelvic sidewall. Path noted muscle invasion and poorly differentiated caricnoma with focal squamous differentiation. Dr Toan Curtis discussed options and ultimately patient decided on radical cystectomy with plans for ileal conduit, given her urinary symptoms. She was referred to cardiology for pre-cardiac work-up.  At that time, Cr noted to be 7 and she was admitted. Imaging indicated moderate R hydronephrosis. She underwent R PCN tube on 2/24. Cr improved to 1.3 today. She underwent planned cystectomy on 3/1/22 with Dr Rere Rogers and intraoperative findings noted extravesical disease involving sigmoid and rectal mesentery as well as fixed bladder anteriorly and to both pelvic sidewall. Cystectomy aborted. Sample from portion of sigmoid mesentery sent for pathology. Oncology consulted to discuss systemic therapy. Review of Systems:  Constitutional Denies fever, chills, weight loss, appetite changes, fatigue, night sweats. HEENT Denies trauma, blurry vision, hearing loss, ear pain, nosebleeds, sore throat, neck pain and ear discharge. Skin Denies lesions or rashes. Lungs Denies dyspnea, cough, sputum production or hemoptysis. Cardiovascular Denies chest pain, palpitations, or lower extremity edema. Gastrointestinal Denies nausea, vomiting, changes in bowel habits, bloody or black stools, abdominal pain.  +urinary symptoms, pelvic pain. Neuro Denies headaches, visual changes or ataxia. Denies dizziness, tingling, tremors, sensory change, speech change, focal weakness or headaches. Hematology Denies easy bruising or bleeding, denies gingival bleeding or epistaxis. Endo Denies heat/cold intolerance, denies diabetes or thyroid abnormalities. MSK Denies back pain, arthralgias, myalgias or frequent falls. Psychiatric/Behavioral +anxiety. Denies depression and substance abuse. The patient is not nervous/anxious.          Allergies   Allergen Reactions    Doxycycline Rash     Other reaction(s): Rash-Allergy    Fentanyl Rash     Other reaction(s): Rash-Allergy    Morphine Rash     Other reaction(s): Rash-Allergy     Past Medical History:   Diagnosis Date    Cancer ([facility] 12/2021    bladder     Chronic pain     Back    Heart failure ([facility]     Echo on 02/11/2022 showed Reduced left ventricular systolic function with a visually estimated EF of 45 - 50%. Normal diastolic function.  Sleep apnea     can not destinee c pap at hs     Past Surgical History:   Procedure Laterality Date    HX APPENDECTOMY      HX HERNIA REPAIR      HX HYSTERECTOMY      HX LAP CHOLECYSTECTOMY      HX ORTHOPAEDIC  1984    toe transplant--- toe removed from left foot and placed on left hand    IR NEPHROSTOMY PERC RT PLC CATH  SI  2022    NEUROLOGICAL PROCEDURE UNLISTED      back surgery     Family History   Problem Relation Age of Onset    Cancer Mother         colon    Cancer Father         lung    Cancer Brother         colon with mets to bladder    Cancer Paternal Uncle      Social History     Socioeconomic History    Marital status:      Spouse name: Not on file    Number of children: Not on file    Years of education: Not on file    Highest education level: Not on file   Occupational History    Not on file   Tobacco Use    Smoking status: Former Smoker     Packs/day: 1.00     Years: 50.00     Pack years: 50.00     Quit date:      Years since quittin.1    Smokeless tobacco: Never Used    Tobacco comment: began smoking age 17   Vaping Use    Vaping Use: Never used   Substance and Sexual Activity    Alcohol use: Never    Drug use: Never    Sexual activity: Not on file   Other Topics Concern    Not on file   Social History Narrative    Not on file     Social Determinants of Health     Financial Resource Strain:     Difficulty of Paying Living Expenses: Not on file   Food Insecurity:     Worried About [address] in the Last Year: Not on file    [address] in the Last Year: Not on file   Transportation Needs:     Lack of Transportation (Medical): Not on file    Lack of Transportation (Non-Medical):  Not on file   Physical Activity:     Days of Exercise per Week: Not on file    Minutes of Exercise per Session: Not on file   Stress:     Feeling of Stress : Not on file   Social Connections:     Frequency of Communication with Friends and Family: Not on file    Frequency of Social Gatherings with Friends and Family: Not on file    Attends Hinduism Services: Not on file    Active Member of Clubs or Organizations: Not on file    Attends Club or Organization Meetings: Not on file    Marital Status: Not on file   Intimate Partner Violence:     Fear of Current or Ex-Partner: Not on file    Emotionally Abused: Not on file    Physically Abused: Not on file    Sexually Abused: Not on file   Housing Stability:     Unable to Pay for Housing in the Last Year: Not on file    Number of [place] in the Last Year: Not on file    Unstable Housing in the Last Year: Not on file     Current Facility-Administered Medications   Medication Dose Route Frequency Provider Last Rate Last Admin    magnesium sulfate 1 g/100 ml IVPB (premix or compounded)  1 g IntraVENous ONCE Marge Jones MD        lidocaine (XYLOCAINE) 10 mg/mL (1 %) injection 0.1 mL  0.1 mL SubCUTAneous PRN Marisabel Morgan MD        sodium chloride (NS) flush 5-40 mL  5-40 mL IntraVENous Q8H Marisabel Morgan MD   10 mL at 03/02/22 0527    sodium chloride (NS) flush 5-40 mL  5-40 mL IntraVENous Q8H Marisabel Morgan MD   10 mL at 03/02/22 0526    sodium chloride (NS) flush 5-40 mL  5-40 mL IntraVENous PRN Marisabel Morgan MD        HYDROmorphone (DILAUDID) injection 0.5 mg  0.5 mg IntraVENous Q4H PRN Marisabel Morgan MD        naloxone [facility]) injection 0.4 mg  0.4 mg IntraVENous PRN Marisabel Morgan MD        aspirin chewable tablet 81 mg  81 mg Oral DAILY Marisabel Morgan MD   81 mg at 03/02/22 4481    lactated Ringers infusion  25 mL/hr IntraVENous CONTINUOUS Marisabel Morgan MD 25 mL/hr at 03/01/22 1649 25 mL/hr at 03/01/22 1649    acetaminophen (TYLENOL) tablet 1,000 mg  1,000 mg Oral Q6H Marisabel Morgan MD   1,000 mg at 03/02/22 0527    diphenhydrAMINE (BENADRYL) capsule 25 mg  25 mg Oral Q6H PRN Marisabel Morgan MD  gabapentin (NEURONTIN) capsule 300 mg  300 mg Oral TID Rodney Jansen MD   300 mg at 22 0850    oxyCODONE IR (ROXICODONE) tablet 5 mg  5 mg Oral Q4H PRN Rodney Jansen MD        fat emulsion 20% (LIPOSYN, INTRAlipid) infusion  0.25-1.5 mL/kg/min (Ideal) IntraVENous PRN Rodney Jansen MD        ondansetron (ZOFRAN ODT) tablet 4 mg  4 mg Oral Q4H PRN Rodney Jansen MD        heparin (porcine) injection 5,000 Units  5,000 Units SubCUTAneous Q8H Rodney Jansen MD   5,000 Units at 22 0240    lidocaine 8 mg/mL (Xylocaine) infusion  1 mg/kg/hr (Ideal) IntraVENous CONTINUOUS Rodney Jansen MD 6.3 mL/hr at 22 1904 1 mg/kg/hr at 22 1904    lip protectant (BLISTEX) ointment 1 Each  1 Each Topical PRN Rodney Jansen MD   1 Each at 22 2126    naloxegoL (MOVANTIK) tablet 12.5 mg  12.5 mg Oral DAILY Rodney Jansen MD   12.5 mg at 22 0850    polyethylene glycol (MIRALAX) packet 17 g  17 g Oral BID PRN Rodney Jansen MD        senna (SENOKOT) tablet 17.2 mg  2 Tablet Oral BID PRN Rodney Jansen MD        0.9% sodium chloride infusion 250 mL  250 mL IntraVENous PRN Rodney Jansen MD        sodium chloride (NS) flush 5-40 mL  5-40 mL IntraVENous Q8H Rodney Jansen MD   10 mL at 22 0527    ondansetron (ZOFRAN) injection 4 mg  4 mg IntraVENous Q6H PRN Rodney Jansen MD           OBJECTIVE:  Patient Vitals for the past 8 hrs:   BP Temp Pulse Resp SpO2 Weight   22 0825 (!) 144/68 97.5 °F (36.4 °C) 76 18 97 %    22 0539 120/77 97.3 °F (36.3 °C) 79 18 100 % 132 lb 9.6 oz (60.1 kg)     Temp (24hrs), Av.6 °F (36.4 °C), Min:97.3 °F (36.3 °C), Max:98.6 °F (37 °C)    No intake/output data recorded. Physical Exam:  Constitutional: Well developed, well nourished female in no acute distress, sitting comfortably in the hospital bed. HEENT: +poor dentition. Normocephalic and atraumatic.  Oropharynx is clear, mucous membranes are moist. Extraocular muscles are intact. Sclerae anicteric. Neck supple without JVD. No thyromegaly present. Skin Warm and dry. No bruising and no rash noted. No erythema. No pallor. Respiratory Lungs are clear to auscultation bilaterally without wheezes, rales or rhonchi, normal air exchange without accessory muscle use. CVS Normal rate, regular rhythm and normal S1 and S2. No murmurs, gallops, or rubs. Abdomen Soft, nontender and nondistended, normoactive bowel sounds. No palpable mass. No hepatosplenomegaly. Neuro Grossly nonfocal with no obvious sensory or motor deficits. MSK Normal range of motion in general.  No edema and no tenderness. Psych +appears anxious, tearful at times.         Labs:    Recent Results (from the past 24 hour(s))   CBC W/O DIFF    Collection Time: 03/02/22  5:22 AM   Result Value Ref Range    WBC 10.9 4.3 - 11.1 K/uL    RBC 3.21 (L) 4.05 - 5.2 M/uL    HGB 8.1 (L) 11.7 - 15.4 g/dL    HCT 27.0 (L) 35.8 - 46.3 %    MCV 84.1 79.6 - 97.8 FL    MCH 25.2 (L) 26.1 - 32.9 PG    MCHC 30.0 (L) 31.4 - 35.0 g/dL    RDW 16.5 (H) 11.9 - 14.6 %    PLATELET 040 348 - 806 K/uL    MPV 8.9 (L) 9.4 - 12.3 FL    ABSOLUTE NRBC 0.00 0.0 - 0.2 K/uL   METABOLIC PANEL, BASIC    Collection Time: 03/02/22  5:22 AM   Result Value Ref Range    Sodium 141 136 - 145 mmol/L    Potassium 4.0 3.5 - 5.1 mmol/L    Chloride 106 98 - 107 mmol/L    CO2 33 (H) 21 - 32 mmol/L    Anion gap 2 (L) 7 - 16 mmol/L    Glucose 122 (H) 65 - 100 mg/dL    BUN 24 (H) 8 - 23 MG/DL    Creatinine 1.30 (H) 0.6 - 1.0 MG/DL    GFR est AA 52 (L) >60 ml/min/1.73m2    GFR est non-AA 43 (L) >60 ml/min/1.73m2    Calcium 8.8 8.3 - 10.4 MG/DL   MAGNESIUM    Collection Time: 03/02/22  5:22 AM   Result Value Ref Range    Magnesium 1.7 (L) 1.8 - 2.4 mg/dL   PHOSPHORUS    Collection Time: 03/02/22  5:22 AM   Result Value Ref Range    Phosphorus 2.9 2.3 - 3.7 MG/DL       Imaging:  [unfilled]    ASSESSMENT:  Problem List  Date Reviewed: 2/22/2022          Codes Class Noted    * (Principal) Acute renal failure (ARF) ([facility] ICD-10-CM: N17.9  ICD-9-CM: 584.9  2/23/2022        Kidney congenitally absent, left ICD-10-CM: Q60.0  ICD-9-CM: 753.0  2/23/2022        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 ([facility] ICD-10-CM: N13.30, C67.9  ICD-9-CM: 591, 188.9  2/23/2022        Preop cardiovascular exam ICD-10-CM: Z01.810  ICD-9-CM: V72.81  2/2/2022        Tobacco use ICD-10-CM: Z72.0  ICD-9-CM: 305.1  7/5/4325        Systolic CHF, chronic (HCC) ICD-10-CM: I50.22  ICD-9-CM: 428.22, 428.0  2/2/2022    Overview Signed 2/2/2022  8:06 AM by Clary Doty MD     Echo 2019 [place] 45% with no severe valvular pathology             THAO (obstructive sleep apnea) ICD-10-CM: G47.33  ICD-9-CM: 327.23  2/2/2022        Hematuria ICD-10-CM: R31.9  ICD-9-CM: 599.70  12/30/2021        Bladder cancer ([facility] ICD-10-CM: C67.9  ICD-9-CM: 188.9  12/30/2021                RECOMMENDATIONS:    Bladder cancer  - s/p TURBT with path indicating muscle invasive poorly differentiated carcinoma with squamous differentiation  - Attempted cystectomy 3/1 but aborted due to fixed bladder and involvement of sigmoid and rectal mesentery by large bladder mass  - Dr Dread Jackson briefly discussed upfront chemotherapy given lack of resectability. She agrees to proceed. Will arrange f/u with Dr Dreda Jackson in 2-3 weeks after recovered from surgery to discuss initiation/timing.  - Will need port - will ask IR to place inpatient, ok to place as outpatient. Hydronephrosis  - s/p R nephrostomy tube 2/24  - Cr improving - 1.3 today    Lab studies and imaging studies (CT) were personally reviewed. Pertinent old records were reviewed from [address] [place] [address] Rd. Thank you for allowing us to participate in the care of Ms. Mirta Sarabiao. Formal consult note by Dr. Dread Jackson to follow. We will arrange for her to see Dr Dread Jackson in 2-3 weeks.          [facility] Hematology & Oncology  [address]  Office : [phone]  Fax : [phone]

## 2022-03-02 NOTE — PROGRESS NOTES
Hospitalist Progress Note   Admit Date:  2022  1:09 PM   Name:  Debbi Mathews   Age:  73 y.o. Sex:  female  :  1950   MRN:  [id number]   Room:  Formerly [facility], [facility]/    Presenting Complaint: Abdominal Pain    Reason(s) for Admission: Acute renal failure (ARF) ([facility] [N17.9]  Bladder cancer [facility]) [C67.9]     Hospital Course & Interval History:   Mrs. 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 She was recently diagnosed with a bladder tumor and underwent cystoscopy with TURBT in December which showed squamous cell carcinoma with extensive necrosis. She had repeat TURBT 21 for debulking. She had recently opted to proceed with radical cystectomy and ileal conduit. She was referred to Cardiology for perioperative risk assessment and routine labs showed hypokalemia and RA so she was referred to the hospital where labs showed K 3.1 and sCr 7.3. She has largely been asymptomatic aside from abdominal pain that has been chronic. Did have oliguria. CT a/p showed R sided hydronephrosis and hydroureter with extrinsic compression from her bladder tumor. Urology consulted who recommended admission and IR consult for percutaneous nephrostomy tube placement. She was started on abx for acute cystitis. Hb on the morning of  dropped from 7.9 to 5.8 though no clinical bleeding noted. Perc nephrostomy placed  with good urine output. Cr improved. 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  Percutaneous nephrostomy placed , ongoing urine output and Cr has improved to 1.5 on 3/1.              - S/p ex-lap on 3/1 with ALINE and creation of omental flap, bladder was immobile so she did not undergo cystectomy. No urinary diversion since she already had perc nephrostomy tube. 3/2/2022- Later today vnetura dced by urology  Oncology evaluated pt today     # Iron deficiency anemia              - Baseline Hb ~8 since January, did drop to 5.8 and was transfused, though no clinical bleeding or hematoma, etc noted on CT at admission. PPI was stopped. Hb has since been stable.     # Constipation              - Resolved. Bowel regimen now PRN.    # HypoK              - ZOECVPEH.     # Acute uncomplicated cystitis/pyuria              - Con't Rocephin. Culture neg to date.     # Squamous cell carcinoma of the bladder              - S/p TURBT x2. Radical cystectomy on Tues 3/1     # H/o tobacco abuse              - Quit ~20 years ago     Dispo/Discharge Planning: Hopefully home soon. Diet:  ADULT DIET Regular; Low Potassium (Less than 3000 mg/day)  ADULT ORAL NUTRITION SUPPLEMENT AM Snack, PM Snack; Other Supplement;  Ensure Surgery Immunonutrition- starting POD 1 - POD 5  DVT PPx: SCDs, ambulation  Code status: Full Code         Hospital Problems as of 3/2/2022 Date Reviewed: 2/22/2022          Codes Class Noted - Resolved POA    * (Principal) [facility] ICD-10-CM: N17.9  ICD-9-CM: 584.9  2/23/2022 - Present Yes        Kidney congenitally absent, left ICD-10-CM: Q60.0  ICD-9-CM: 753.0  2/23/2022 - Present Yes        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 ([facility] ICD-10-CM: N13.30, C67.9  ICD-9-CM: 591, 188.9  2/23/2022 - Present Yes        Systolic CHF, chronic (HCC) ICD-10-CM: I50.22  ICD-9-CM: 428.22, 428.0  2/2/2022 - Present Yes    Overview Signed 2/2/2022  8:06 AM by Ashok Lyons MD     Echo 2019 [place] 45% with no severe valvular pathology             THAO (obstructive sleep apnea) ICD-10-CM: G47.33  ICD-9-CM: 327.23  2/2/2022 - Present Yes        Hematuria ICD-10-CM: R31.9  ICD-9-CM: 599.70  12/30/2021 - Present Yes        [facility] ICD-10-CM: C67.9  ICD-9-CM: 188.9  12/30/2021 - Present Yes        RESOLVED: Constipation ICD-10-CM: K59.00  ICD-9-CM: 564.00  2/27/2022 - 2/27/2022 Unknown        RESOLVED: Hypokalemia ICD-10-CM: E87.6  ICD-9-CM: 276.8  2/23/2022 - 2/27/2022 Yes        RESOLVED: Acute UTI ICD-10-CM: N39.0  ICD-9-CM: 599.0  2/23/2022 - 2/27/2022 Yes              Objective:     Patient Vitals for the past 24 hrs:   Temp Pulse Resp BP SpO2   03/02/22 1208 97.4 °F (36.3 °C) 100 20 129/79 96 %   03/02/22 0825 97.5 °F (36.4 °C) 76 18 (!) 144/68 97 %   03/02/22 0539 97.3 °F (36.3 °C) 79 18 120/77 100 %   03/01/22 2335 97.3 °F (36.3 °C) 88 18 130/79 98 %   03/01/22 1944 98 °F (36.7 °C) 75 18 128/74 97 %   03/01/22 1700 97.3 °F (36.3 °C) 73 16 127/74 96 %     Oxygen Therapy  O2 Sat (%): 96 % (03/02/22 1208)  Pulse via Oximetry: 77 beats per minute (03/01/22 1105)  O2 Device: Nasal cannula (03/01/22 1105)  Skin Assessment: Clean, dry, & intact (03/01/22 1105)  O2 Flow Rate (L/min): 2 l/min (03/01/22 1105)  ETCO2 (mmHg): 22 mmHg (02/24/22 1230)    Estimated body mass index is 24.25 kg/m² as calculated from the following:    Height as of this encounter: 5' 2\" (1.575 m). Weight as of this encounter: 60.1 kg (132 lb 9.6 oz). Intake/Output Summary (Last 24 hours) at 3/2/2022 1400  Last data filed at 3/2/2022 1125  Gross per 24 hour   Intake 597 ml   Output 1725 ml   Net -1128 ml         Physical Exam:     Blood pressure 129/79, pulse 100, temperature 97.4 °F (36.3 °C), resp. rate 20, height 5' 2\" (1.575 m), weight 60.1 kg (132 lb 9.6 oz), SpO2 96 %. General:    Well nourished. No overt distress  Head:  Normocephalic, atraumatic  Eyes:  Sclerae appear normal.  Pupils equally round. ENT:  Nares appear normal, no drainage. Moist oral mucosa  Neck:  No restricted ROM. Trachea midline   CV:   RRR.   No m/r/g.  No jugular venous distension. Lungs:   CTAB. No wheezing, rhonchi, or rales. Respirations even, unlabored  Abdomen: Bowel sounds present. Soft, nontender, nondistended. Rt nephrostomy tube  Has urinary cath- mild blood tinged  Extremities: No cyanosis or clubbing. No edema  Skin:     No rashes and normal coloration. Warm and dry. Neuro:  CN II-XII grossly intact. Sensation intact. A&Ox3  Psych:  Normal mood and affect. I have reviewed ordered lab tests and independently visualized imaging below:    Recent Labs:  Recent Results (from the past 48 hour(s))   CBC W/O DIFF    Collection Time: 03/01/22  4:40 AM   Result Value Ref Range    WBC 8.1 4.3 - 11.1 K/uL    RBC 3.77 (L) 4.05 - 5.2 M/uL    HGB 9.5 (L) 11.7 - 15.4 g/dL    HCT 31.2 (L) 35.8 - 46.3 %    MCV 82.8 79.6 - 97.8 FL    MCH 25.2 (L) 26.1 - 32.9 PG    MCHC 30.4 (L) 31.4 - 35.0 g/dL    RDW 16.6 (H) 11.9 - 14.6 %    PLATELET 050 546 - 969 K/uL    MPV 9.1 (L) 9.4 - 12.3 FL    ABSOLUTE NRBC 0.00 0.0 - 0.2 K/uL   METABOLIC PANEL, BASIC    Collection Time: 03/01/22  4:40 AM   Result Value Ref Range    Sodium 143 136 - 145 mmol/L    Potassium 3.4 (L) 3.5 - 5.1 mmol/L    Chloride 105 98 - 107 mmol/L    CO2 32 21 - 32 mmol/L    Anion gap 6 (L) 7 - 16 mmol/L    Glucose 98 65 - 100 mg/dL    BUN 30 (H) 8 - 23 MG/DL    Creatinine 1.50 (H) 0.6 - 1.0 MG/DL    GFR est AA 44 (L) >60 ml/min/1.73m2    GFR est non-AA 37 (L) >60 ml/min/1.73m2    Calcium 8.8 8.3 - 10.4 MG/DL   RBC, ALLOCATE    Collection Time: 03/01/22  6:00 AM   Result Value Ref Range    HISTORY CHECKED? Historical check performed    RBC, ALLOCATE    Collection Time: 03/01/22  8:30 AM   Result Value Ref Range    HISTORY CHECKED?  Historical check performed    CBC W/O DIFF    Collection Time: 03/02/22  5:22 AM   Result Value Ref Range    WBC 10.9 4.3 - 11.1 K/uL    RBC 3.21 (L) 4.05 - 5.2 M/uL    HGB 8.1 (L) 11.7 - 15.4 g/dL    HCT 27.0 (L) 35.8 - 46.3 %    MCV 84.1 79.6 - 97.8 FL MCH 25.2 (L) 26.1 - 32.9 PG    MCHC 30.0 (L) 31.4 - 35.0 g/dL    RDW 16.5 (H) 11.9 - 14.6 %    PLATELET 224 165 - 332 K/uL    MPV 8.9 (L) 9.4 - 12.3 FL    ABSOLUTE NRBC 0.00 0.0 - 0.2 K/uL   METABOLIC PANEL, BASIC    Collection Time: 03/02/22  5:22 AM   Result Value Ref Range    Sodium 141 136 - 145 mmol/L    Potassium 4.0 3.5 - 5.1 mmol/L    Chloride 106 98 - 107 mmol/L    CO2 33 (H) 21 - 32 mmol/L    Anion gap 2 (L) 7 - 16 mmol/L    Glucose 122 (H) 65 - 100 mg/dL    BUN 24 (H) 8 - 23 MG/DL    Creatinine 1.30 (H) 0.6 - 1.0 MG/DL    GFR est AA 52 (L) >60 ml/min/1.73m2    GFR est non-AA 43 (L) >60 ml/min/1.73m2    Calcium 8.8 8.3 - 10.4 MG/DL   MAGNESIUM    Collection Time: 03/02/22  5:22 AM   Result Value Ref Range    Magnesium 1.7 (L) 1.8 - 2.4 mg/dL   PHOSPHORUS    Collection Time: 03/02/22  5:22 AM   Result Value Ref Range    Phosphorus 2.9 2.3 - 3.7 MG/DL       All Micro Results     Procedure Component Value Units Date/Time    CULTURE, BLOOD [[id number]] Collected: 02/23/22 1558    Order Status: Completed Specimen: Blood Updated: 02/28/22 1435     Special Requests: --        RIGHT  ARM       Culture result: NO GROWTH 5 DAYS       CULTURE, BLOOD [[id number]] Collected: 02/23/22 1558    Order Status: Completed Specimen: Blood Updated: 02/28/22 1435     Special Requests: --        LEFT  FOREARM       Culture result: NO GROWTH 5 DAYS       CULTURE, URINE [[id number]] Collected: 02/23/22 1410    Order Status: Completed Specimen: Cath Urine Updated: 02/26/22 0704     Special Requests: NO SPECIAL REQUESTS        Culture result:       >100,000 COLONIES/mL NORMAL SKIN KAILASH ISOLATED          COVID-19 RAPID TEST [[id number]] Collected: 02/23/22 1819    Order Status: Completed Specimen: Nasopharyngeal Updated: 02/23/22 1931     Specimen source NASAL        COVID-19 rapid test Not detected        Comment:      The specimen is NEGATIVE for SARS-CoV-2, the novel coronavirus associated with COVID-19.   A negative result does not rule out COVID-19. This test has been authorized by the FDA under an Emergency Use Authorization (EUA) for use by authorized laboratories. Fact sheet for Healthcare Providers: ConventionUpdate.co.nz  Fact sheet for Patients: ConventionUpdate.co.nz       Methodology: Isothermal Nucleic Acid Amplification               Other Studies:  No results found.     Current Meds:  Current Facility-Administered Medications   Medication Dose Route Frequency    lidocaine (XYLOCAINE) 10 mg/mL (1 %) injection 0.1 mL  0.1 mL SubCUTAneous PRN    sodium chloride (NS) flush 5-40 mL  5-40 mL IntraVENous Q8H    sodium chloride (NS) flush 5-40 mL  5-40 mL IntraVENous Q8H    sodium chloride (NS) flush 5-40 mL  5-40 mL IntraVENous PRN    HYDROmorphone (DILAUDID) injection 0.5 mg  0.5 mg IntraVENous Q4H PRN    naloxone (NARCAN) injection 0.4 mg  0.4 mg IntraVENous PRN    aspirin chewable tablet 81 mg  81 mg Oral DAILY    acetaminophen (TYLENOL) tablet 1,000 mg  1,000 mg Oral Q6H    diphenhydrAMINE (BENADRYL) capsule 25 mg  25 mg Oral Q6H PRN    gabapentin (NEURONTIN) capsule 300 mg  300 mg Oral TID    oxyCODONE IR (ROXICODONE) tablet 5 mg  5 mg Oral Q4H PRN    fat emulsion 20% (LIPOSYN, INTRAlipid) infusion  0.25-1.5 mL/kg/min (Ideal) IntraVENous PRN    ondansetron (ZOFRAN ODT) tablet 4 mg  4 mg Oral Q4H PRN    heparin (porcine) injection 5,000 Units  5,000 Units SubCUTAneous Q8H    lip protectant (BLISTEX) ointment 1 Each  1 Each Topical PRN    naloxegoL (MOVANTIK) tablet 12.5 mg  12.5 mg Oral DAILY    polyethylene glycol (MIRALAX) packet 17 g  17 g Oral BID PRN    senna (SENOKOT) tablet 17.2 mg  2 Tablet Oral BID PRN    0.9% sodium chloride infusion 250 mL  250 mL IntraVENous PRN    sodium chloride (NS) flush 5-40 mL  5-40 mL IntraVENous Q8H    ondansetron (ZOFRAN) injection 4 mg  4 mg IntraVENous Q6H PRN       Signed:  Solange Moss MD

## 2022-03-02 NOTE — PROGRESS NOTES
Problem: Falls - Risk of  Goal: *Absence of Falls  Description: Document Darlen Clovis Fall Risk and appropriate interventions in the flowsheet.   Outcome: Progressing Towards Goal  Note: Fall Risk Interventions:  Mobility Interventions: Patient to call before getting OOB         Medication Interventions: Patient to call before getting OOB    Elimination Interventions: Call light in reach              Problem: Patient Education: Go to Patient Education Activity  Goal: Patient/Family Education  Outcome: Progressing Towards Goal     Problem: Patient Education: Go to Patient Education Activity  Goal: Patient/Family Education  Outcome: Progressing Towards Goal     Problem: Acute Renal Failure: Day 1  Goal: Off Pathway (Use only if patient is Off Pathway)  Outcome: Progressing Towards Goal  Goal: Activity/Safety  Outcome: Progressing Towards Goal  Goal: Consults, if ordered  Outcome: Progressing Towards Goal  Goal: Diagnostic Test/Procedures  Outcome: Progressing Towards Goal  Goal: Nutrition/Diet  Outcome: Progressing Towards Goal  Goal: Discharge Planning  Outcome: Progressing Towards Goal  Goal: Medications  Outcome: Progressing Towards Goal  Goal: Respiratory  Outcome: Progressing Towards Goal  Goal: Treatments/Interventions/Procedures  Outcome: Progressing Towards Goal  Goal: Psychosocial  Outcome: Progressing Towards Goal  Goal: *Optimal pain control at patient's stated goal  Outcome: Progressing Towards Goal  Goal: *Urinary output within identified parameters  Outcome: Progressing Towards Goal  Goal: *Hemodynamically stable  Outcome: Progressing Towards Goal  Goal: *Tolerating diet  Outcome: Progressing Towards Goal     Problem: Acute Renal Failure: Day 2  Goal: Off Pathway (Use only if patient is Off Pathway)  Outcome: Progressing Towards Goal  Goal: Activity/Safety  Outcome: Progressing Towards Goal  Goal: Consults, if ordered  Outcome: Progressing Towards Goal  Goal: Diagnostic Test/Procedures  Outcome: Progressing Towards Goal  Goal: Nutrition/Diet  Outcome: Progressing Towards Goal  Goal: Discharge Planning  Outcome: Progressing Towards Goal  Goal: Medications  Outcome: Progressing Towards Goal  Goal: Respiratory  Outcome: Progressing Towards Goal  Goal: Treatments/Interventions/Procedures  Outcome: Progressing Towards Goal  Goal: Psychosocial  Outcome: Progressing Towards Goal  Goal: *Optimal pain control at patient's stated goal  Outcome: Progressing Towards Goal  Goal: *Urinary output within identified parameters  Outcome: Progressing Towards Goal  Goal: *Hemodynamically stable  Outcome: Progressing Towards Goal  Goal: *Tolerating diet  Outcome: Progressing Towards Goal  Goal: *Lab values improving  Outcome: Progressing Towards Goal     Problem: Acute Renal Failure: Day 3  Goal: Off Pathway (Use only if patient is Off Pathway)  Outcome: Progressing Towards Goal  Goal: Activity/Safety  Outcome: Progressing Towards Goal  Goal: Consults, if ordered  Outcome: Progressing Towards Goal  Goal: Diagnostic Test/Procedures  Outcome: Progressing Towards Goal  Goal: Nutrition/Diet  Outcome: Progressing Towards Goal  Goal: Discharge Planning  Outcome: Progressing Towards Goal  Goal: Medications  Outcome: Progressing Towards Goal  Goal: Respiratory  Outcome: Progressing Towards Goal  Goal: Treatments/Interventions/Procedures  Outcome: Progressing Towards Goal  Goal: Psychosocial  Outcome: Progressing Towards Goal  Goal: *Optimal pain control at patient's stated goal  Outcome: Progressing Towards Goal  Goal: *Urinary output within identified parameters  Outcome: Progressing Towards Goal  Goal: *Hemodynamically stable  Outcome: Progressing Towards Goal  Goal: *Tolerating diet  Outcome: Progressing Towards Goal  Goal: *Lab values improving  Outcome: Progressing Towards Goal     Problem: Acute Renal Failure: Day 4  Goal: Off Pathway (Use only if patient is Off Pathway)  Outcome: Progressing Towards Goal  Goal: Activity/Safety  Outcome: Progressing Towards Goal  Goal: Consults, if ordered  Outcome: Progressing Towards Goal  Goal: Diagnostic Test/Procedures  Outcome: Progressing Towards Goal  Goal: Nutrition/Diet  Outcome: Progressing Towards Goal  Goal: Discharge Planning  Outcome: Progressing Towards Goal  Goal: Medications  Outcome: Progressing Towards Goal  Goal: Respiratory  Outcome: Progressing Towards Goal  Goal: Treatments/Interventions/Procedures  Outcome: Progressing Towards Goal  Goal: Psychosocial  Outcome: Progressing Towards Goal  Goal: *Optimal pain control at patient's stated goal  Outcome: Progressing Towards Goal  Goal: *Urinary output within identified parameters  Outcome: Progressing Towards Goal  Goal: *Hemodynamically stable  Outcome: Progressing Towards Goal  Goal: *Tolerating diet  Outcome: Progressing Towards Goal  Goal: *Lab values improving  Outcome: Progressing Towards Goal     Problem: Acute Renal Failure: Day 5  Goal: Off Pathway (Use only if patient is Off Pathway)  Outcome: Progressing Towards Goal  Goal: Activity/Safety  Outcome: Progressing Towards Goal  Goal: Diagnostic Test/Procedures  Outcome: Progressing Towards Goal  Goal: Nutrition/Diet  Outcome: Progressing Towards Goal  Goal: Discharge Planning  Outcome: Progressing Towards Goal  Goal: Medications  Outcome: Progressing Towards Goal  Goal: Respiratory  Outcome: Progressing Towards Goal  Goal: Treatments/Interventions/Procedures  Outcome: Progressing Towards Goal  Goal: Psychosocial  Outcome: Progressing Towards Goal  Goal: *Optimal pain control at patient's stated goal  Outcome: Progressing Towards Goal  Goal: *Urinary output within identified parameters  Outcome: Progressing Towards Goal  Goal: *Hemodynamically stable  Outcome: Progressing Towards Goal  Goal: *Tolerating diet  Outcome: Progressing Towards Goal  Goal: *Lab values improving  Outcome: Progressing Towards Goal     Problem: Acute Renal Failure: Day 6  Goal: Off Pathway (Use only if patient is Off Pathway)  Outcome: Progressing Towards Goal  Goal: Activity/Safety  Outcome: Progressing Towards Goal  Goal: Diagnostic Test/Procedures  Outcome: Progressing Towards Goal  Goal: Nutrition/Diet  Outcome: Progressing Towards Goal  Goal: Discharge Planning  Outcome: Progressing Towards Goal  Goal: Medications  Outcome: Progressing Towards Goal  Goal: Respiratory  Outcome: Progressing Towards Goal  Goal: Treatments/Interventions/Procedures  Outcome: Progressing Towards Goal  Goal: Psychosocial  Outcome: Progressing Towards Goal

## 2022-03-03 NOTE — CONSULTS
Mu Gerard      Patient Name: Misty Escalera    Date of Consult: 3/2/2022  : 1950  Age:71 y.o. M:[id number]          Reason for Consultation:  Ms. Kingsley Mason is a 73 y.o. female admitted on 2022 with a primary diagnosis of bladder cancer. [facility] History of Present Illness:  Ms. Kingsley Mason is a patient of Dr. Blanche Pina. She has a history of extensive smoking. She was referred to Dr. Blanche Pina after another urologist Dr. Sarina Phan performed a TURBT. 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 At the time of her initial visit to Dr. Blanche Pina in 2021 she had already lost approximately 40 to 45 pounds over 3 months. This was associated with lower abdominal pain. CT scanning at around that time demonstrated a 7 cm bladder mass arising from the anterior right lateral wall. There was diffuse urothelial enhancement and a 1.7 cm right pelvic mass concerning for a lymph node. There was no evidence of disease outside of the pelvis. A follow-up TURBT was performed by Dr. Blanche Pina on  confirming the presence of a large bladder tumor involving the right trigone, sidewall and anterior bladder neck. It could not be fully resected. Pathology at [facility] was notable for a poorly differentiated carcinoma with focal squamous differentiation. A decision for radical cystectomy and ileal conduit was made and she was ultimately admitted after cardiac clearance. Her creatinine was noted to be 7 which normalized after placement of a stent in her right ureter. On 2022 the cystectomy was attempted but ultimately aborted as it was very clear the bladder was fixed to both pelvic sidewalls. The tumor was also adherent to the sigmoid colon.     Medications:   Current Facility-Administered Medications   Medication Dose Route Frequency Provider Last Rate Last Admin    lidocaine (XYLOCAINE) 10 mg/mL (1 %) injection 0.1 mL  0.1 mL SubCUTAneous PRN Giovany Carey MD        sodium chloride (NS) flush 5-40 mL  5-40 mL IntraVENous Q8H Giovany Carey MD   10 mL at 03/02/22 1400    sodium chloride (NS) flush 5-40 mL  5-40 mL IntraVENous Q8H Giovany Carey MD   10 mL at 03/02/22 1400    sodium chloride (NS) flush 5-40 mL  5-40 mL IntraVENous PRN Giovany Carey MD        HYDROmorphone (DILAUDID) injection 0.5 mg  0.5 mg IntraVENous Q4H PRN Giovany Carey MD        naloxone [facility]) injection 0.4 mg  0.4 mg IntraVENous PRN Giovany Carey MD        aspirin chewable tablet 81 mg  81 mg Oral DAILY Giovany Carey MD   81 mg at 03/02/22 8779    acetaminophen (TYLENOL) tablet 1,000 mg  1,000 mg Oral Q6H Giovany Carey MD   1,000 mg at 03/02/22 1709    diphenhydrAMINE (BENADRYL) capsule 25 mg  25 mg Oral Q6H PRN Giovany Carey MD        gabapentin (NEURONTIN) capsule 300 mg  300 mg Oral TID Giovany Carey MD   300 mg at 03/02/22 1617    oxyCODONE IR (ROXICODONE) tablet 5 mg  5 mg Oral Q4H PRN Giovany Carey MD        fat emulsion 20% (LIPOSYN, INTRAlipid) infusion  0.25-1.5 mL/kg/min (Ideal) IntraVENous PRN Giovany Carey MD        ondansetron (ZOFRAN ODT) tablet 4 mg  4 mg Oral Q4H PRN Giovany Carey MD        heparin (porcine) injection 5,000 Units  5,000 Units SubCUTAneous Q8H Giovany Carey MD   5,000 Units at 03/02/22 1807    lip protectant (BLISTEX) ointment 1 Each  1 Each Topical PRN Giovany Carey MD   1 Each at 03/01/22 2126    naloxegoL (MOVANTIK) tablet 12.5 mg  12.5 mg Oral DAILY Giovany Carey MD   12.5 mg at 03/02/22 0850    polyethylene glycol (MIRALAX) packet 17 g  17 g Oral BID PRN Giovany Carey MD        senna (SENOKOT) tablet 17.2 mg  2 Tablet Oral BID PRN Giovany Carey MD        0.9% sodium chloride infusion 250 mL  250 mL IntraVENous PRN Giovany Carey MD        sodium chloride (NS) flush 5-40 mL  5-40 mL IntraVENous Q8H Jena Daugherty MD   10 mL at 22 1400    ondansetron (ZOFRAN) injection 4 mg  4 mg IntraVENous Q6H PRN Jena Daugherty MD           Allergies: Allergies   Allergen Reactions    Doxycycline Rash     Other reaction(s): Rash-Allergy    Fentanyl Rash     Other reaction(s): Rash-Allergy    Morphine Rash     Other reaction(s): Rash-Allergy       Review of Systems: The Review of Systems is documented in full in the internal medical record. All systems are negative other than for those noted above. Past Medical History:  Past Medical History:   Diagnosis Date    Cancer ([facility] [address] 2021    bladder     Chronic pain     Back    Heart failure ([facility] [address]     Echo on 2022 showed Reduced left ventricular systolic function with a visually estimated EF of 45 - 50%. Normal diastolic function.     Sleep apnea     can not destinee c pap at hs       Past Surgical History:  Past Surgical History:   Procedure Laterality Date    HX APPENDECTOMY      HX HERNIA REPAIR      HX HYSTERECTOMY      HX LAP CHOLECYSTECTOMY      HX ORTHOPAEDIC  1984    toe transplant--- toe removed from left foot and placed on left hand    IR NEPHROSTOMY PERC RT PLC CATH  SI  2022    NEUROLOGICAL PROCEDURE UNLISTED      back surgery       Social History:  Social History     Tobacco Use    Smoking status: Former Smoker     Packs/day: 1.00     Years: 50.00     Pack years: 50.00     Quit date:      Years since quittin.1    Smokeless tobacco: Never Used    Tobacco comment: began smoking age 17   Vaping Use    Vaping Use: Never used   Substance Use Topics    Alcohol use: Never    Drug use: Never       Family History:  Family History   Problem Relation Age of Onset    Cancer Mother         colon    Cancer Father         lung    Cancer Brother         colon with mets to bladder    Cancer Paternal Uncle          Physical Examination:  General Appearance: Cachectic appearing patient in no acute distress. Vital signs:   Visit Vitals  /72   Pulse 82   Temp 97.9 °F (36.6 °C)   Resp 18   Ht 5' 2\" (1.575 m)   Wt 132 lb 9.6 oz (60.1 kg)   SpO2 90%   BMI 24.25 kg/m²     HEENT: No oral or pharyngeal masses. There is no ulceration or thrush. poor dentition. Neck: Supple. There is no thyromegaly. Lymph nodes: There is no cervical, supraclavicular, axillary or inguinal adenopathy. Breasts: Not examined  Lungs: The lungs are clear to auscultation and percussion. There is no egophony. There is no chest wall tenderness and no  use of accessory respiratory musculature. Heart: There is no jugular venous distention. The rate is normal and rhythm regular. The S1 and S2 are normal and there are no murmurs or rubs. Abdomen: Soft, non-tender, bowel sounds present and normal, no appreciated hepatosplenomegaly. No palpable masses. Skin: No rash, petechiae or ecchymoses. No evidence of malignancy. Musculoskeletal: No bony or muscular tenderness. No joint effusions  Extremities: No cyanosis, clubbing or edema. Neurologic: Comprehension and speech normal. Cranial nerves intact. No focality in motor, sensory or reflex exams.      Labs:    Recent Results (from the past 24 hour(s))   CBC W/O DIFF    Collection Time: 03/02/22  5:22 AM   Result Value Ref Range    WBC 10.9 4.3 - 11.1 K/uL    RBC 3.21 (L) 4.05 - 5.2 M/uL    HGB 8.1 (L) 11.7 - 15.4 g/dL    HCT 27.0 (L) 35.8 - 46.3 %    MCV 84.1 79.6 - 97.8 FL    MCH 25.2 (L) 26.1 - 32.9 PG    MCHC 30.0 (L) 31.4 - 35.0 g/dL    RDW 16.5 (H) 11.9 - 14.6 %    PLATELET 644 767 - 511 K/uL    MPV 8.9 (L) 9.4 - 12.3 FL    ABSOLUTE NRBC 0.00 0.0 - 0.2 K/uL   METABOLIC PANEL, BASIC    Collection Time: 03/02/22  5:22 AM   Result Value Ref Range    Sodium 141 136 - 145 mmol/L    Potassium 4.0 3.5 - 5.1 mmol/L    Chloride 106 98 - 107 mmol/L    CO2 33 (H) 21 - 32 mmol/L    Anion gap 2 (L) 7 - 16 mmol/L    Glucose 122 (H) 65 - 100 mg/dL    BUN 24 (H) 8 - 23 MG/DL Creatinine 1.30 (H) 0.6 - 1.0 MG/DL    GFR est AA 52 (L) >60 ml/min/1.73m2    GFR est non-AA 43 (L) >60 ml/min/1.73m2    Calcium 8.8 8.3 - 10.4 MG/DL   MAGNESIUM    Collection Time: 03/02/22  5:22 AM   Result Value Ref Range    Magnesium 1.7 (L) 1.8 - 2.4 mg/dL   PHOSPHORUS    Collection Time: 03/02/22  5:22 AM   Result Value Ref Range    Phosphorus 2.9 2.3 - 3.7 MG/DL       Imaging:        ASSESSMENT:  This patient has a locally advanced transitional cell carcinoma with squamous differentiation. The tumor is unresectable and the patient is quite frail. She is a candidate for chemotherapy plus or minus immunotherapy in an effort to shrink this mass and provide some palliation. It is potentially unlikely that there would be sufficient shrinkage to allow for resection. PLAN:  At this point, the patient seems agreeable to systemic therapy. She will need to recuperate from her surgery after which she will see me to discuss treatment options. Ideally she would be treated with gemcitabine and cisplatin. Although her creatinine is adequate for the cisplatin with the stent in place, given her frailty she may have difficulty with cisplatin regardless. We will need to reassess her once she has been discharged and more fully recuperated. Alicia Josue MD FACP    Oncology and Hematology   [address] [phone]  F [phone]  [email or web address]      Elements of this note have been dictated using speech recognition software. As a result, errors of speech recognition may have occurred.

## 2022-03-03 NOTE — PROGRESS NOTES
ACUTE PHYSICAL THERAPY GOALS:  (Developed with and agreed upon by patient and/or caregiver.)  1. Pt will perform bed mobility (I) without cueing in 7 therapy sessions. 2. Pt will perform all transfers under (S) c use of LRAD in 7 therapy sessions. 3. Pt will ambulate 200 ft SB (A) with use of LRAD and breaks as needed in 7 therapy sessions. 4. Pt will perform standing balance activities with minimal postural sway in 7 therapy sessions. 5. Pt will tolerate multiple sets and reps of BLE exercises in 7 therapy sessions. PHYSICAL THERAPY: Daily Note and PM Treatment Day # 2    Ahsan Sheldon is a 73 y.o. female   PRIMARY DIAGNOSIS: Acute renal failure (ARF) ([facility] [address]  Acute renal failure (ARF) ([facility] [address] [N17.9]  Bladder cancer ([facility] [address] [C67.9]  Procedure(s) (LRB):  EX LAP/ LYSIS OF ADHESIONS/ OMENTAL FLAP (N/A)  2 Days Post-Op    ASSESSMENT:     REHAB RECOMMENDATIONS: CURRENT LEVEL OF FUNCTION:  (Most Recently Demonstrated)   Recommendation to date pending progress:  Setting:   No further skilled therapy after discharge from hospital  Equipment:    Rolling Walker Bed Mobility:   Supervision  Sit to Stand:  [employer] Assistance  Transfers:   Not tested  Gait/Mobility:  [employer] Assistance     ASSESSMENT:  Ms. Estefania Wilkins was supine in bed on RA upon entering the room and agreeable to therapy. Today, pt demonstrates good progress toward established goals as she was able to make notable improvements in activity tolerance and did-so without requiring inc level of (A) from therapy staff. This session, pt required (S) for bed mobility, SB (A) for sit-to-stand and CG (A) c use of RW/ gait belt for ambulation. Pt cont to ambulate c dec michael, inc postural sway, dec foot clearance YULY and step-through strategy although she does well to remain steady within RW. This session, pt inc ambulation distance to 500'x1 with no rest-breaks and no LOB.  At this time, pt remains a good candidate for skilled PT and will continue to benefit from advancing POC. At end of session, pt was positioned to comfort in chair with all needs in reach. RN was made aware of pt performance.         SUBJECTIVE:   Ms. Aime Weiss states, \"I'm gonna work for whatever I set my mind to\"    SOCIAL HISTORY/ LIVING ENVIRONMENT: See Eval  Home Environment: Private residence  One/Two Story Residence: One story  Living Alone: No  Support Systems: Spouse/Significant Other  OBJECTIVE:     PAIN: VITAL SIGNS: LINES/DRAINS:   Pre Treatment: Pain Screen  Pain Scale 1: Numeric (0 - 10)  Pain Intensity 1: 0  Post Treatment: 0 Vital Signs  O2 Device: None (Room air) None  O2 Device: None (Room air)     MOBILITY: I Mod I S SBA CGA Min Mod Max Total  NT x2 Comments:   Bed Mobility    Rolling [] [] [] [] [] [] [] [] [] [] []    Supine to Sit [] [] [x] [] [] [] [] [] [] [] []    Scooting [] [] [x] [] [] [] [] [] [] [] []    Sit to Supine [] [] [x] [] [] [] [] [] [] [] []    Transfers    Sit to Stand [] [] [] [x] [] [] [] [] [] [] []    Bed to Chair [] [] [] [] [] [] [] [] [] [x] []    Stand to Sit [] [] [] [x] [] [] [] [] [] [] []    I=Independent, Mod I=Modified Independent, S=Supervision, SBA=Standby Assistance, CGA=Contact Guard Assistance,   Min=Minimal Assistance, Mod=Moderate Assistance, Max=Maximal Assistance, Total=Total Assistance, NT=Not Tested    BALANCE: Good Fair+ Fair Fair- Poor NT Comments   Sitting Static [x] [] [] [] [] []    Sitting Dynamic [x] [] [] [] [] []              Standing Static [x] [] [] [] [] []    Standing Dynamic [] [x] [] [] [] []      GAIT: I Mod I S SBA CGA Min Mod Max Total  NT x2 Comments:   Level of Assistance [] [] [] [] [x] [] [] [] [] [] []    Distance 500'x1    DME Rolling Walker    Gait Quality Dec michael, inc postural sway, dec foot clearance    Weightbearing  Status N/A     I=Independent, Mod I=Modified Independent, S=Supervision, SBA=Standby Assistance, CGA=Contact Guard Assistance,   Min=Minimal Assistance, Mod=Moderate Assistance, Max=Maximal Assistance, Total=Total Assistance, NT=Not Tested    PLAN:   FREQUENCY/DURATION: PT Plan of Care: 4 times/week for duration of hospital stay or until stated goals are met, whichever comes first.  TREATMENT:     TREATMENT:   ($$ Therapeutic Activity: 8-22 mins    )  Therapeutic Activity (14 Minutes): Therapeutic activity included Supine to Sit, Sit to Supine, Scooting, Ambulation on level ground, Sitting balance  and Standing balance to improve functional Mobility, Strength and Activity tolerance.     TREATMENT GRID:  N/A    AFTER TREATMENT POSITION/PRECAUTIONS:  Bed, Needs within reach, RN notified and Visitors at bedside    INTERDISCIPLINARY COLLABORATION:  RN/PCT and PT/PTA    TOTAL TREATMENT DURATION:  PT Patient Time In/Time Out  Time In: 1315  Time Out: Robby 10, PT

## 2022-03-03 NOTE — PROGRESS NOTES
Pt is stable with bed in lowest position, wheels locked, and call light within reach. Hourly rounds completed. VSS. IV is patent and dressing is clean, dry, and intact. Pt got up to chair for 2 hours during shift. All meds in the ERAS schedule have been given. Pt had no complaints of pain during the shift. Report to be given to day shift nurse.

## 2022-03-03 NOTE — PROGRESS NOTES
Hospitalist Progress Note   Admit Date:  2022  1:09 PM   Name:  Tsering Marcus   Age:  73 y.o. Sex:  female  :  1950   MRN:  [id number]   Room:  [facility]    Presenting Complaint: Abdominal Pain    Reason(s) for Admission: Acute renal failure (ARF) ([facility] [N17.9]  Bladder cancer [facility]) [C67.9]     Hospital Course & Interval History:   Mrs. 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 She was recently diagnosed with a bladder tumor and underwent cystoscopy with TURBT in December which showed squamous cell carcinoma with extensive necrosis. She had repeat TURBT 21 for debulking. She had recently opted to proceed with radical cystectomy and ileal conduit. She was referred to Cardiology for perioperative risk assessment and routine labs showed hypokalemia and RA so she was referred to the hospital where labs showed K 3.1 and sCr 7.3. She has largely been asymptomatic aside from abdominal pain that has been chronic. Did have oliguria. CT a/p showed R sided hydronephrosis and hydroureter with extrinsic compression from her bladder tumor. Urology consulted who recommended admission and IR consult for percutaneous nephrostomy tube placement. She was started on abx for acute cystitis. Hb on the morning of  dropped from 7.9 to 5.8 though no clinical bleeding noted. Perc nephrostomy placed  with good urine output. Cr improved.  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 Percutaneous nephrostomy placed 2/24, ongoing urine output and Cr has improved to 1.5 on 3/1.              - S/p ex-lap on 3/1 with ALINE and creation of omental flap, bladder was immobile so she did not undergo cystectomy. No urinary diversion since she already had perc nephrostomy tube. 3/2/2022- Later today [place] dced by urology  Oncology evaluated pt today  3/3/2022-improving kidney function, added NS     # Iron deficiency anemia              - Baseline Hb ~8 since January, did drop to 5.8 and was transfused, though no clinical bleeding or hematoma, etc noted on CT at admission. PPI was stopped. Hb has since been stable.     # Constipation              - Resolved. Bowel regimen now PRN.    # HypoK              - [id number].     # Acute uncomplicated cystitis/pyuria              - Con't Rocephin. Culture neg to date.     # Squamous cell carcinoma of the bladder              - S/p TURBT x2. Radical cystectomy on Tues 3/1     # H/o tobacco abuse              - Quit ~20 years ago     Dispo/Discharge Planning: Hopefully home soon. Diet:  ADULT DIET Regular; Low Potassium (Less than 3000 mg/day)  ADULT ORAL NUTRITION SUPPLEMENT AM Snack, PM Snack; Other Supplement;  Ensure Surgery Immunonutrition- starting POD 1 - POD 5  DVT PPx: SCDs, ambulation  Code status: Full Code         Hospital Problems as of 3/3/2022 Date Reviewed: 2/22/2022          Codes Class Noted - Resolved POA    * (Principal) Acute renal failure (ARF) ([facility] ICD-10-CM: N17.9  ICD-9-CM: 584.9  2/23/2022 - Present Yes        Kidney congenitally absent, left ICD-10-CM: Q60.0  ICD-9-CM: 753.0  2/23/2022 - Present Yes        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 ([facility] ICD-10-CM: N13.30, C67.9  ICD-9-CM: 591, 188.9  2/23/2022 - Present Yes        Systolic CHF, chronic (HCC) ICD-10-CM: I50.22  ICD-9-CM: 428.22, 428.0  2/2/2022 - Present Yes    Overview Signed 2/2/2022  8:06 AM by Nel Murray MD     Echo 2019 [place] 45% with no severe valvular pathology             THAO (obstructive sleep apnea) ICD-10-CM: G47.33  ICD-9-CM: 327.23  2/2/2022 - Present Yes        Hematuria ICD-10-CM: R31.9  ICD-9-CM: 599.70  12/30/2021 - Present Yes        Bladder cancer ([facility] ICD-10-CM: C67.9  ICD-9-CM: 188.9  12/30/2021 - Present Yes        RESOLVED: Constipation ICD-10-CM: K59.00  ICD-9-CM: 564.00  2/27/2022 - 2/27/2022 Unknown        RESOLVED: Hypokalemia ICD-10-CM: E87.6  ICD-9-CM: 276.8  2/23/2022 - 2/27/2022 Yes        RESOLVED: Acute UTI ICD-10-CM: N39.0  ICD-9-CM: 599.0  2/23/2022 - 2/27/2022 Yes              Objective:     Patient Vitals for the past 24 hrs:   Temp Pulse Resp BP SpO2   03/03/22 1600 97.7 °F (36.5 °C) 98 16 (!) 103/57 94 %   03/03/22 1128 97.9 °F (36.6 °C) (!) 110 16 97/67 97 %   03/03/22 0705 97.6 °F (36.4 °C) 77 18 112/70 96 %   03/03/22 0457 97.3 °F (36.3 °C) 78 16 (!) 98/51 96 %   03/03/22 0001 98 °F (36.7 °C) 80 16 97/61 94 %   03/02/22 1954 98.2 °F (36.8 °C) 81 18 115/72 93 %     Oxygen Therapy  O2 Sat (%): 94 % (03/03/22 1600)  Pulse via Oximetry: 77 beats per minute (03/01/22 1105)  O2 Device: None (Room air) (03/03/22 1315)  Skin Assessment: Clean, dry, & intact (03/01/22 1105)  O2 Flow Rate (L/min): 2 l/min (03/01/22 1105)  ETCO2 (mmHg): 22 mmHg (02/24/22 1230)    Estimated body mass index is 24.76 kg/m² as calculated from the following:    Height as of this encounter: 5' 2\" (1.575 m). Weight as of this encounter: 61.4 kg (135 lb 6.4 oz). Intake/Output Summary (Last 24 hours) at 3/3/2022 1630  Last data filed at 3/3/2022 1039  Gross per 24 hour   Intake 677 ml   Output 2375 ml   Net -1698 ml         Physical Exam:     Blood pressure (!) 103/57, pulse 98, temperature 97.7 °F (36.5 °C), resp. rate 16, height 5' 2\" (1.575 m), weight 61.4 kg (135 lb 6.4 oz), SpO2 94 %. General:    Well nourished.   No overt distress  Head:  Normocephalic, atraumatic  Eyes:  Sclerae appear normal.  Pupils equally round. ENT:  Nares appear normal, no drainage. Moist oral mucosa  Neck:  No restricted ROM. Trachea midline   CV:   RRR. No m/r/g. No jugular venous distension. Lungs:   CTAB. No wheezing, rhonchi, or rales. Respirations even, unlabored  Abdomen: Bowel sounds present. Soft, nontender, nondistended. Rt nephrostomy tube  Midline incision site intact, minimal tenderness. Extremities: No cyanosis or clubbing. No edema  Skin:     No rashes and normal coloration. Warm and dry. Neuro:  CN II-XII grossly intact. Sensation intact. A&Ox3  Psych:  Normal mood and affect. Off urinary catheter.     I have reviewed ordered lab tests and independently visualized imaging below:    Recent Labs:  Recent Results (from the past 48 hour(s))   CBC W/O DIFF    Collection Time: 03/02/22  5:22 AM   Result Value Ref Range    WBC 10.9 4.3 - 11.1 K/uL    RBC 3.21 (L) 4.05 - 5.2 M/uL    HGB 8.1 (L) 11.7 - 15.4 g/dL    HCT 27.0 (L) 35.8 - 46.3 %    MCV 84.1 79.6 - 97.8 FL    MCH 25.2 (L) 26.1 - 32.9 PG    MCHC 30.0 (L) 31.4 - 35.0 g/dL    RDW 16.5 (H) 11.9 - 14.6 %    PLATELET 911 739 - 832 K/uL    MPV 8.9 (L) 9.4 - 12.3 FL    ABSOLUTE NRBC 0.00 0.0 - 0.2 K/uL   METABOLIC PANEL, BASIC    Collection Time: 03/02/22  5:22 AM   Result Value Ref Range    Sodium 141 136 - 145 mmol/L    Potassium 4.0 3.5 - 5.1 mmol/L    Chloride 106 98 - 107 mmol/L    CO2 33 (H) 21 - 32 mmol/L    Anion gap 2 (L) 7 - 16 mmol/L    Glucose 122 (H) 65 - 100 mg/dL    BUN 24 (H) 8 - 23 MG/DL    Creatinine 1.30 (H) 0.6 - 1.0 MG/DL    GFR est AA 52 (L) >60 ml/min/1.73m2    GFR est non-AA 43 (L) >60 ml/min/1.73m2    Calcium 8.8 8.3 - 10.4 MG/DL   MAGNESIUM    Collection Time: 03/02/22  5:22 AM   Result Value Ref Range    Magnesium 1.7 (L) 1.8 - 2.4 mg/dL   PHOSPHORUS    Collection Time: 03/02/22  5:22 AM   Result Value Ref Range    Phosphorus 2.9 2.3 - 3.7 MG/DL   CBC WITH AUTOMATED DIFF Collection Time: 03/03/22  5:38 AM   Result Value Ref Range    WBC 12.1 (H) 4.3 - 11.1 K/uL    RBC 3.20 (L) 4.05 - 5.2 M/uL    HGB 8.1 (L) 11.7 - 15.4 g/dL    HCT 27.4 (L) 35.8 - 46.3 %    MCV 85.6 79.6 - 97.8 FL    MCH 25.3 (L) 26.1 - 32.9 PG    MCHC 29.6 (L) 31.4 - 35.0 g/dL    RDW 16.9 (H) 11.9 - 14.6 %    PLATELET 384 076 - 315 K/uL    MPV 9.5 9.4 - 12.3 FL    ABSOLUTE NRBC 0.00 0.0 - 0.2 K/uL    DF AUTOMATED      NEUTROPHILS 82 (H) 43 - 78 %    LYMPHOCYTES 11 (L) 13 - 44 %    MONOCYTES 6 4.0 - 12.0 %    EOSINOPHILS 0 (L) 0.5 - 7.8 %    BASOPHILS 0 0.0 - 2.0 %    IMMATURE GRANULOCYTES 1 0.0 - 5.0 %    ABS. NEUTROPHILS 9.9 (H) 1.7 - 8.2 K/UL    ABS. LYMPHOCYTES 1.4 0.5 - 4.6 K/UL    ABS. MONOCYTES 0.7 0.1 - 1.3 K/UL    ABS. EOSINOPHILS 0.1 0.0 - 0.8 K/UL    ABS. BASOPHILS 0.0 0.0 - 0.2 K/UL    ABS. IMM.  GRANS. 0.1 0.0 - 0.5 K/UL   METABOLIC PANEL, BASIC    Collection Time: 03/03/22  5:38 AM   Result Value Ref Range    Sodium 143 136 - 145 mmol/L    Potassium 3.9 3.5 - 5.1 mmol/L    Chloride 107 98 - 107 mmol/L    CO2 34 (H) 21 - 32 mmol/L    Anion gap 2 (L) 7 - 16 mmol/L    Glucose 88 65 - 100 mg/dL    BUN 30 (H) 8 - 23 MG/DL    Creatinine 1.20 (H) 0.6 - 1.0 MG/DL    GFR est AA 57 (L) >60 ml/min/1.73m2    GFR est non-AA 47 (L) >60 ml/min/1.73m2    Calcium 8.7 8.3 - 10.4 MG/DL   MAGNESIUM    Collection Time: 03/03/22  5:38 AM   Result Value Ref Range    Magnesium 1.8 1.8 - 2.4 mg/dL       All Micro Results     Procedure Component Value Units Date/Time    CULTURE, BLOOD [[id number]] Collected: 02/23/22 1558    Order Status: Completed Specimen: Blood Updated: 02/28/22 1435     Special Requests: --        RIGHT  ARM       Culture result: NO GROWTH 5 DAYS       CULTURE, BLOOD [[id number]] Collected: 02/23/22 1558    Order Status: Completed Specimen: Blood Updated: 02/28/22 1435     Special Requests: --        LEFT  FOREARM       Culture result: NO GROWTH 5 DAYS       CULTURE, URINE [[id number]] Collected: 02/23/22 1410 Order Status: Completed Specimen: Cath Urine Updated: 02/26/22 0704     Special Requests: NO SPECIAL REQUESTS        Culture result:       >100,000 COLONIES/mL NORMAL SKIN KAILASH ISOLATED          COVID-19 RAPID TEST [[id number]] Collected: 02/23/22 1819    Order Status: Completed Specimen: Nasopharyngeal Updated: 02/23/22 1931     Specimen source NASAL        COVID-19 rapid test Not detected        Comment:      The specimen is NEGATIVE for SARS-CoV-2, the novel coronavirus associated with COVID-19. A negative result does not rule out COVID-19. This test has been authorized by the FDA under an Emergency Use Authorization (EUA) for use by authorized laboratories. Fact sheet for Healthcare Providers: [employer]  Fact sheet for Patients: [employer]nz       Methodology: Isothermal Nucleic Acid Amplification               Other Studies:  No results found.     Current Meds:  Current Facility-Administered Medications   Medication Dose Route Frequency    alteplase (CATHFLO) injection 1 mg  1 mg InterCATHeter ONCE    lidocaine (XYLOCAINE) 10 mg/mL (1 %) injection 0.1 mL  0.1 mL SubCUTAneous PRN    sodium chloride (NS) flush 5-40 mL  5-40 mL IntraVENous Q8H    sodium chloride (NS) flush 5-40 mL  5-40 mL IntraVENous Q8H    sodium chloride (NS) flush 5-40 mL  5-40 mL IntraVENous PRN    HYDROmorphone (DILAUDID) injection 0.5 mg  0.5 mg IntraVENous Q4H PRN    naloxone (NARCAN) injection 0.4 mg  0.4 mg IntraVENous PRN    aspirin chewable tablet 81 mg  81 mg Oral DAILY    acetaminophen (TYLENOL) tablet 1,000 mg  1,000 mg Oral Q6H    diphenhydrAMINE (BENADRYL) capsule 25 mg  25 mg Oral Q6H PRN    oxyCODONE IR (ROXICODONE) tablet 5 mg  5 mg Oral Q4H PRN    ondansetron (ZOFRAN ODT) tablet 4 mg  4 mg Oral Q4H PRN    heparin (porcine) injection 5,000 Units  5,000 Units SubCUTAneous Q8H    lip protectant (BLISTEX) ointment 1 Each  1 Each Topical PRN  naloxegoL (MOVANTIK) tablet 12.5 mg  12.5 mg Oral DAILY    polyethylene glycol (MIRALAX) packet 17 g  17 g Oral BID PRN    senna (SENOKOT) tablet 17.2 mg  2 Tablet Oral BID PRN    sodium chloride (NS) flush 5-40 mL  5-40 mL IntraVENous Q8H    ondansetron (ZOFRAN) injection 4 mg  4 mg IntraVENous Q6H PRN       Signed:  Yuki Quezada MD

## 2022-03-03 NOTE — PROGRESS NOTES
RW supplied by [facility] has been provided to patient. Consent form for DME, faxed to [address] [place] [address]. CM following plan of care.

## 2022-03-03 NOTE — PROGRESS NOTES
Urology Progress Note    Admit Date: 2/23/2022    Subjective:     Patient has no new complaints. Tristan reg diet. Creat down to 1.2. Objective:     Patient Vitals for the past 8 hrs:   BP Temp Pulse Resp SpO2 Weight   03/03/22 0457 (!) 98/51 97.3 °F (36.3 °C) 78 16 96 % 135 lb 6.4 oz (61.4 kg)   03/03/22 0001 97/61 98 °F (36.7 °C) 80 16 94 %      No intake/output data recorded. 03/01 1901 - 03/03 0700  In: 5471 [P.O.:1277]  Out: 3654 [Urine:3725]    Physical Exam:     Awake, alert, and oriented  Lungs no JVD. Breathing is  non-labored; no audible wheezing. Heart regular, normal perfusion  Abd soft, nt, nd, incision c/d/i  UOP clear      Data Review   Recent Results (from the past 24 hour(s))   CBC WITH AUTOMATED DIFF    Collection Time: 03/03/22  5:38 AM   Result Value Ref Range    WBC 12.1 (H) 4.3 - 11.1 K/uL    RBC 3.20 (L) 4.05 - 5.2 M/uL    HGB 8.1 (L) 11.7 - 15.4 g/dL    HCT 27.4 (L) 35.8 - 46.3 %    MCV 85.6 79.6 - 97.8 FL    MCH 25.3 (L) 26.1 - 32.9 PG    MCHC 29.6 (L) 31.4 - 35.0 g/dL    RDW 16.9 (H) 11.9 - 14.6 %    PLATELET 295 339 - 043 K/uL    MPV 9.5 9.4 - 12.3 FL    ABSOLUTE NRBC 0.00 0.0 - 0.2 K/uL    DF AUTOMATED      NEUTROPHILS 82 (H) 43 - 78 %    LYMPHOCYTES 11 (L) 13 - 44 %    MONOCYTES 6 4.0 - 12.0 %    EOSINOPHILS 0 (L) 0.5 - 7.8 %    BASOPHILS 0 0.0 - 2.0 %    IMMATURE GRANULOCYTES 1 0.0 - 5.0 %    ABS. NEUTROPHILS 9.9 (H) 1.7 - 8.2 K/UL    ABS. LYMPHOCYTES 1.4 0.5 - 4.6 K/UL    ABS. MONOCYTES 0.7 0.1 - 1.3 K/UL    ABS. EOSINOPHILS 0.1 0.0 - 0.8 K/UL    ABS. BASOPHILS 0.0 0.0 - 0.2 K/UL    ABS. IMM.  GRANS. 0.1 0.0 - 0.5 K/UL   METABOLIC PANEL, BASIC    Collection Time: 03/03/22  5:38 AM   Result Value Ref Range    Sodium 143 136 - 145 mmol/L    Potassium 3.9 3.5 - 5.1 mmol/L    Chloride 107 98 - 107 mmol/L    CO2 34 (H) 21 - 32 mmol/L    Anion gap 2 (L) 7 - 16 mmol/L    Glucose 88 65 - 100 mg/dL    BUN 30 (H) 8 - 23 MG/DL    Creatinine 1.20 (H) 0.6 - 1.0 MG/DL    GFR est AA 57 (L) >60 ml/min/1.73m2    GFR est non-AA 47 (L) >60 ml/min/1.73m2    Calcium 8.7 8.3 - 10.4 MG/DL           Assessment:     Principal Problem:    Acute renal failure (ARF) ([address] (2/23/2022)    Active Problems:    Hematuria (12/30/2021)      Bladder cancer ([address] (33/98/9460)      Systolic CHF, chronic (HCC) (2/2/2022)      Overview: Echo 2019 [place] 45% with no severe valvular pathology      THAO (obstructive sleep apnea) (2/2/2022)      Kidney congenitally absent, 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   Pathology from tumor extension out of bladder onto sigmoid colon--POORLY DIFFERENTIATED MALIGNANT TUMOR WITH FOCAL SQUAMOUS   DIFFERENTIATION SIMILAR TO PRIOR MATERIAL  Plan:     -d/c IJ line  -oob walk  -d/c home tomorrow am  --fu with oncology in 1-2 weeks--I appreciate them seeing her as an [facility], MD    [facility]  [address]

## 2022-03-03 NOTE — PROGRESS NOTES
Problem: Falls - Risk of  Goal: *Absence of Falls  Description: Document Dennie Oak Fall Risk and appropriate interventions in the flowsheet.   Outcome: Progressing Towards Goal  Note: Fall Risk Interventions:  Mobility Interventions: Patient to call before getting OOB         Medication Interventions: Patient to call before getting OOB    Elimination Interventions: Call light in reach              Problem: Patient Education: Go to Patient Education Activity  Goal: Patient/Family Education  Outcome: Progressing Towards Goal     Problem: Patient Education: Go to Patient Education Activity  Goal: Patient/Family Education  Outcome: Progressing Towards Goal     Problem: Acute Renal Failure: Day 1  Goal: Off Pathway (Use only if patient is Off Pathway)  Outcome: Progressing Towards Goal  Goal: Activity/Safety  Outcome: Progressing Towards Goal  Goal: Consults, if ordered  Outcome: Progressing Towards Goal  Goal: Diagnostic Test/Procedures  Outcome: Progressing Towards Goal  Goal: Nutrition/Diet  Outcome: Progressing Towards Goal  Goal: Discharge Planning  Outcome: Progressing Towards Goal  Goal: Medications  Outcome: Progressing Towards Goal  Goal: Respiratory  Outcome: Progressing Towards Goal  Goal: Treatments/Interventions/Procedures  Outcome: Progressing Towards Goal  Goal: Psychosocial  Outcome: Progressing Towards Goal  Goal: *Optimal pain control at patient's stated goal  Outcome: Progressing Towards Goal  Goal: *Urinary output within identified parameters  Outcome: Progressing Towards Goal  Goal: *Hemodynamically stable  Outcome: Progressing Towards Goal  Goal: *Tolerating diet  Outcome: Progressing Towards Goal     Problem: Acute Renal Failure: Day 1  Goal: Off Pathway (Use only if patient is Off Pathway)  Outcome: Progressing Towards Goal  Goal: Activity/Safety  Outcome: Progressing Towards Goal  Goal: Consults, if ordered  Outcome: Progressing Towards Goal  Goal: Diagnostic Test/Procedures  Outcome: Progressing Towards Goal  Goal: Nutrition/Diet  Outcome: Progressing Towards Goal  Goal: Discharge Planning  Outcome: Progressing Towards Goal  Goal: Medications  Outcome: Progressing Towards Goal  Goal: Respiratory  Outcome: Progressing Towards Goal  Goal: Treatments/Interventions/Procedures  Outcome: Progressing Towards Goal  Goal: Psychosocial  Outcome: Progressing Towards Goal  Goal: *Optimal pain control at patient's stated goal  Outcome: Progressing Towards Goal  Goal: *Urinary output within identified parameters  Outcome: Progressing Towards Goal  Goal: *Hemodynamically stable  Outcome: Progressing Towards Goal  Goal: *Tolerating diet  Outcome: Progressing Towards Goal     Problem: Acute Renal Failure: Day 2  Goal: Off Pathway (Use only if patient is Off Pathway)  Outcome: Progressing Towards Goal  Goal: Activity/Safety  Outcome: Progressing Towards Goal  Goal: Consults, if ordered  Outcome: Progressing Towards Goal  Goal: Diagnostic Test/Procedures  Outcome: Progressing Towards Goal  Goal: Nutrition/Diet  Outcome: Progressing Towards Goal  Goal: Discharge Planning  Outcome: Progressing Towards Goal  Goal: Medications  Outcome: Progressing Towards Goal  Goal: Respiratory  Outcome: Progressing Towards Goal  Goal: Treatments/Interventions/Procedures  Outcome: Progressing Towards Goal  Goal: Psychosocial  Outcome: Progressing Towards Goal  Goal: *Optimal pain control at patient's stated goal  Outcome: Progressing Towards Goal  Goal: *Urinary output within identified parameters  Outcome: Progressing Towards Goal  Goal: *Hemodynamically stable  Outcome: Progressing Towards Goal  Goal: *Tolerating diet  Outcome: Progressing Towards Goal  Goal: *Lab values improving  Outcome: Progressing Towards Goal     Problem: Acute Renal Failure: Day 3  Goal: Off Pathway (Use only if patient is Off Pathway)  Outcome: Progressing Towards Goal  Goal: Activity/Safety  Outcome: Progressing Towards Goal  Goal: Consults, if ordered  Outcome: Progressing Towards Goal  Goal: Diagnostic Test/Procedures  Outcome: Progressing Towards Goal  Goal: Nutrition/Diet  Outcome: Progressing Towards Goal  Goal: Discharge Planning  Outcome: Progressing Towards Goal  Goal: Medications  Outcome: Progressing Towards Goal  Goal: Respiratory  Outcome: Progressing Towards Goal  Goal: Treatments/Interventions/Procedures  Outcome: Progressing Towards Goal  Goal: Psychosocial  Outcome: Progressing Towards Goal  Goal: *Optimal pain control at patient's stated goal  Outcome: Progressing Towards Goal  Goal: *Urinary output within identified parameters  Outcome: Progressing Towards Goal  Goal: *Hemodynamically stable  Outcome: Progressing Towards Goal  Goal: *Tolerating diet  Outcome: Progressing Towards Goal  Goal: *Lab values improving  Outcome: Progressing Towards Goal     Problem: Acute Renal Failure: Day 4  Goal: Off Pathway (Use only if patient is Off Pathway)  Outcome: Progressing Towards Goal  Goal: Activity/Safety  Outcome: Progressing Towards Goal  Goal: Consults, if ordered  Outcome: Progressing Towards Goal  Goal: Diagnostic Test/Procedures  Outcome: Progressing Towards Goal  Goal: Nutrition/Diet  Outcome: Progressing Towards Goal  Goal: Discharge Planning  Outcome: Progressing Towards Goal  Goal: Medications  Outcome: Progressing Towards Goal  Goal: Respiratory  Outcome: Progressing Towards Goal  Goal: Treatments/Interventions/Procedures  Outcome: Progressing Towards Goal  Goal: Psychosocial  Outcome: Progressing Towards Goal  Goal: *Optimal pain control at patient's stated goal  Outcome: Progressing Towards Goal  Goal: *Urinary output within identified parameters  Outcome: Progressing Towards Goal  Goal: *Hemodynamically stable  Outcome: Progressing Towards Goal  Goal: *Tolerating diet  Outcome: Progressing Towards Goal  Goal: *Lab values improving  Outcome: Progressing Towards Goal     Problem: Acute Renal Failure: Day 4  Goal: Off Pathway (Use only if patient is Off Pathway)  Outcome: Progressing Towards Goal  Goal: Activity/Safety  Outcome: Progressing Towards Goal  Goal: Consults, if ordered  Outcome: Progressing Towards Goal  Goal: Diagnostic Test/Procedures  Outcome: Progressing Towards Goal  Goal: Nutrition/Diet  Outcome: Progressing Towards Goal  Goal: Discharge Planning  Outcome: Progressing Towards Goal  Goal: Medications  Outcome: Progressing Towards Goal  Goal: Respiratory  Outcome: Progressing Towards Goal  Goal: Treatments/Interventions/Procedures  Outcome: Progressing Towards Goal  Goal: Psychosocial  Outcome: Progressing Towards Goal  Goal: *Optimal pain control at patient's stated goal  Outcome: Progressing Towards Goal  Goal: *Urinary output within identified parameters  Outcome: Progressing Towards Goal  Goal: *Hemodynamically stable  Outcome: Progressing Towards Goal  Goal: *Tolerating diet  Outcome: Progressing Towards Goal  Goal: *Lab values improving  Outcome: Progressing Towards Goal

## 2022-03-04 PROBLEM — Z01.810 PREOP CARDIOVASCULAR EXAM: Status: RESOLVED | Noted: 2022-01-01 | Resolved: 2022-01-01

## 2022-03-04 NOTE — DISCHARGE SUMMARY
Physician Discharge Summary    Name: Yeyo Varela  Medical Record Number: [id number]       Account Number:  [de-identified]  YOB: 1950                         Age:  73 y.o. Admit date:  2/23/2022                    Discharge date:  3-4-22    Attending Physician: Kian Reid MD            Service: SURGERY    Physician Summary completed by: Kian Reid MD    Reason for hospitalization: bladder cancer and ARF    Significant PMH:   Past Medical History:   Diagnosis Date    Cancer ([address] 12/2021    bladder     Chronic pain     Back    Heart failure ([address]     Echo on 02/11/2022 showed Reduced left ventricular systolic function with a visually estimated EF of 45 - 50%. Normal diastolic function.  Sleep apnea     can not destinee c pap at hs       Allergies: Allergies   Allergen Reactions    Doxycycline Rash     Other reaction(s): Rash-Allergy    Fentanyl Rash     Other reaction(s): Rash-Allergy    Morphine Rash     Other reaction(s): Rash-Allergy       Brief Hospital Course: The patient was admitted and had the procedure listed below performed without difficulty. Her hospital course progressed as expected. No acute events occurred throughout her hospital stay. She was discharged in stable condition. Admission Physical Exam notable for:    hematuria    Admission Lab/Radiology studies notable for:   Creat 8    Surgical Procedures:  Right perc neph and ex lap (aborted cystectomy)    Condition at Discharge: stable    Discharge Diagnoses:     Acute renal failure (ARF) ([facility] [N17.9]; Bladder cancer ([facility] [C67.9]    Significant Diagnostic Studies and Procedures:  Noted in brief hospital course. Patient Disposition: home       Patient instructions/medications:  Current Discharge Medication List      START taking these medications    Details   HYDROcodone-acetaminophen (NORCO) 5-325 mg per tablet Take 1 Tablet by mouth every eight (8) hours as needed for Pain for up to 7 days.  Max Daily Amount: 3 Tablets. Qty: 25 Tablet, Refills: 0  Start date: 3/4/2022, End date: 3/11/2022    Associated Diagnoses: Malignant neoplasm of overlapping sites of bladder ([facility]         STOP taking these medications       aspirin delayed-release 81 mg tablet Comments:   Reason for Stopping: Follow Up Instructions: Follow-up Appointments   Procedures    FOLLOW UP VISIT Appointment in: Two Weeks My office will schedule your follow-up appointment. Please call our office ([phone]) or return to ED if you experience fever > 101.5, severe pain, persistent nausea/vomiting, excessive bleeding, inabi. .. My office will schedule your follow-up appointment. Please call our office ([phone]) or return to ED if you experience fever > 101.5, severe pain, persistent nausea/vomiting, excessive bleeding, inability to urinate, or other concerns. Standing Status:   Standing     Number of Occurrences:   1     Order Specific Question:   Appointment in     Answer: Two Weeks        Pending items needing follow up: Bulmaro Serrato was instructed to follow up as indicated above. Signed: Cuate Ireland MD, MPH, [facility]  [address] [facility]  [place], [address]  Phone: [phone]  Fax: [phone]    cc:  Primary Care Physician:  Verified  Referring physicians:     Additional provider(s):

## 2022-03-04 NOTE — PROGRESS NOTES
Pt had uneventful shift, she was up to chair for all meals, ambulated TID with assistance, and completed surgical ensure ordered. Also, tylenol was administered BID on this shift as scheduled and fluids was encouraged.

## 2022-03-04 NOTE — PROGRESS NOTES
ERAS  POD 3    Patient with discharge order. Patient sitting up in chair using IS (1000 ml total volume). States had BM yesterday afternoon. Nephrostomy drain with clear yellow urine. Applied dry gauze at insertion site. Slight pink with no drainage noted. Instructed patient about s/sx of infection. Verbalized understanding. Supplied dry gauze and paper tape for patient to change dressing at home. Patient demonstrated how to empty nephrostomy drain. Patient supplied with rolling walker for home. Encouraged patient to continue daily antibacterial wash, IS, and ambulation after discharge.

## 2022-03-04 NOTE — PROGRESS NOTES
Pt is stable with bed in lowest position, wheels locked, and call light within reach. Hourly rounds completed. VSS. Pt got up to chair. Tolerated well. Pain treated per MAR. IV is patent and dressing is clean, dry, and intact. Pt had bloody output. MD called no answer. Note in chart awaiting morning rounds. Report to be given to day shift nurse.

## 2022-03-04 NOTE — PROGRESS NOTES
Problem: Falls - Risk of  Goal: *Absence of Falls  Description: Document Jackie Lay Fall Risk and appropriate interventions in the flowsheet.   Outcome: Resolved/Met     Problem: Acute Renal Failure: Day 1  Goal: Off Pathway (Use only if patient is Off Pathway)  Outcome: Resolved/Met  Goal: Activity/Safety  Outcome: Resolved/Met  Goal: Consults, if ordered  Outcome: Resolved/Met  Goal: Diagnostic Test/Procedures  Outcome: Resolved/Met  Goal: Nutrition/Diet  Outcome: Resolved/Met  Goal: Discharge Planning  Outcome: Resolved/Met  Goal: Medications  Outcome: Resolved/Met  Goal: Respiratory  Outcome: Resolved/Met  Goal: Treatments/Interventions/Procedures  Outcome: Resolved/Met  Goal: Psychosocial  Outcome: Resolved/Met  Goal: *Optimal pain control at patient's stated goal  Outcome: Resolved/Met  Goal: *Urinary output within identified parameters  Outcome: Resolved/Met  Goal: *Hemodynamically stable  Outcome: Resolved/Met  Goal: *Tolerating diet  Outcome: Resolved/Met     Problem: Patient Education: Go to Patient Education Activity  Goal: Patient/Family Education  Outcome: Resolved/Met     Problem: Patient Education: Go to Patient Education Activity  Goal: Patient/Family Education  Outcome: Resolved/Met     Problem: Acute Renal Failure: Day 4  Goal: Off Pathway (Use only if patient is Off Pathway)  Outcome: Resolved/Met  Goal: Activity/Safety  Outcome: Resolved/Met  Goal: Consults, if ordered  Outcome: Resolved/Met  Goal: Diagnostic Test/Procedures  Outcome: Resolved/Met  Goal: Nutrition/Diet  Outcome: Resolved/Met  Goal: Discharge Planning  Outcome: Resolved/Met  Goal: Medications  Outcome: Resolved/Met  Goal: Respiratory  Outcome: Resolved/Met  Goal: Treatments/Interventions/Procedures  Outcome: Resolved/Met  Goal: Psychosocial  Outcome: Resolved/Met  Goal: *Optimal pain control at patient's stated goal  Outcome: Resolved/Met  Goal: *Urinary output within identified parameters  Outcome: Resolved/Met  Goal: *Hemodynamically stable  Outcome: Resolved/Met  Goal: *Tolerating diet  Outcome: Resolved/Met  Goal: *Lab values improving  Outcome: Resolved/Met     Problem: Acute Renal Failure: Day 5  Goal: Off Pathway (Use only if patient is Off Pathway)  Outcome: Resolved/Met  Goal: Activity/Safety  Outcome: Resolved/Met  Goal: Diagnostic Test/Procedures  Outcome: Resolved/Met  Goal: Nutrition/Diet  Outcome: Resolved/Met  Goal: Discharge Planning  Outcome: Resolved/Met  Goal: Medications  Outcome: Resolved/Met  Goal: Respiratory  Outcome: Resolved/Met  Goal: Treatments/Interventions/Procedures  Outcome: Resolved/Met  Goal: Psychosocial  Outcome: Resolved/Met  Goal: *Optimal pain control at patient's stated goal  Outcome: Resolved/Met  Goal: *Urinary output within identified parameters  Outcome: Resolved/Met  Goal: *Hemodynamically stable  Outcome: Resolved/Met  Goal: *Tolerating diet  Outcome: Resolved/Met  Goal: *Lab values improving  Outcome: Resolved/Met     Problem: Acute Renal Failure: Day 6  Goal: Off Pathway (Use only if patient is Off Pathway)  Outcome: Resolved/Met  Goal: Activity/Safety  Outcome: Resolved/Met  Goal: Diagnostic Test/Procedures  Outcome: Resolved/Met  Goal: Nutrition/Diet  Outcome: Resolved/Met  Goal: Discharge Planning  Outcome: Resolved/Met  Goal: Medications  Outcome: Resolved/Met  Goal: Respiratory  Outcome: Resolved/Met  Goal: Treatments/Interventions/Procedures  Outcome: Resolved/Met  Goal: Psychosocial  Outcome: Resolved/Met     Problem: Acute Renal Failure: Discharge Outcomes  Goal: *Optimal pain control at patient's stated goal  Outcome: Resolved/Met  Goal: *Urinary output within identified parameters  Outcome: Resolved/Met  Goal: *Hemodynamically stable  Outcome: Resolved/Met  Goal: *Tolerating diet  Outcome: Resolved/Met  Goal: *Lab values stabilized  Outcome: Resolved/Met  Goal: *Verbalizes understanding and describes medication purposes and frequencies  Outcome: Resolved/Met  Goal: *Medication reconciliation  Outcome: Resolved/Met     Problem: Anemia Care Plan (Adult and Pediatric)  Goal: *Labs within defined limits  Outcome: Resolved/Met  Goal: *Tolerates increased activity  Outcome: Resolved/Met     Problem: Patient Education: Go to Patient Education Activity  Goal: Patient/Family Education  Outcome: Resolved/Met     Problem: Patient Education: Go to Patient Education Activity  Goal: Patient/Family Education  Outcome: Resolved/Met     Problem: Patient Education: Go to Patient Education Activity  Goal: Patient/Family Education  Outcome: Resolved/Met

## 2022-03-04 NOTE — PROGRESS NOTES
Pt is alert and oriented at time of discharge. AVS reviewed with pt, pt voiced understanding. Opportunity given to voice questions/concerns. Pt provided with enough Ensure Surgery drinks to complete ERAS post-op protocol. Pt states her ride will be here around 1200.

## 2022-03-04 NOTE — PROGRESS NOTES
Problem: Falls - Risk of  Goal: *Absence of Falls  Description: Document Allen Callahan Fall Risk and appropriate interventions in the flowsheet.   Outcome: Progressing Towards Goal  Note: Fall Risk Interventions:  Mobility Interventions: Patient to call before getting OOB         Medication Interventions: Patient to call before getting OOB    Elimination Interventions: Call light in reach              Problem: Patient Education: Go to Patient Education Activity  Goal: Patient/Family Education  Outcome: Progressing Towards Goal     Problem: Patient Education: Go to Patient Education Activity  Goal: Patient/Family Education  Outcome: Progressing Towards Goal     Problem: Acute Renal Failure: Day 1  Goal: Off Pathway (Use only if patient is Off Pathway)  Outcome: Progressing Towards Goal  Goal: Activity/Safety  Outcome: Progressing Towards Goal  Goal: Consults, if ordered  Outcome: Progressing Towards Goal  Goal: Diagnostic Test/Procedures  Outcome: Progressing Towards Goal  Goal: Nutrition/Diet  Outcome: Progressing Towards Goal  Goal: Discharge Planning  Outcome: Progressing Towards Goal  Goal: Medications  Outcome: Progressing Towards Goal  Goal: Respiratory  Outcome: Progressing Towards Goal  Goal: Treatments/Interventions/Procedures  Outcome: Progressing Towards Goal  Goal: Psychosocial  Outcome: Progressing Towards Goal  Goal: *Optimal pain control at patient's stated goal  Outcome: Progressing Towards Goal  Goal: *Urinary output within identified parameters  Outcome: Progressing Towards Goal  Goal: *Hemodynamically stable  Outcome: Progressing Towards Goal  Goal: *Tolerating diet  Outcome: Progressing Towards Goal     Problem: Acute Renal Failure: Day 2  Goal: Off Pathway (Use only if patient is Off Pathway)  Outcome: Progressing Towards Goal  Goal: Activity/Safety  Outcome: Progressing Towards Goal  Goal: Consults, if ordered  Outcome: Progressing Towards Goal  Goal: Diagnostic Test/Procedures  Outcome: Progressing Towards Goal  Goal: Nutrition/Diet  Outcome: Progressing Towards Goal  Goal: Discharge Planning  Outcome: Progressing Towards Goal  Goal: Medications  Outcome: Progressing Towards Goal  Goal: Respiratory  Outcome: Progressing Towards Goal  Goal: Treatments/Interventions/Procedures  Outcome: Progressing Towards Goal  Goal: Psychosocial  Outcome: Progressing Towards Goal  Goal: *Optimal pain control at patient's stated goal  Outcome: Progressing Towards Goal  Goal: *Urinary output within identified parameters  Outcome: Progressing Towards Goal  Goal: *Hemodynamically stable  Outcome: Progressing Towards Goal  Goal: *Tolerating diet  Outcome: Progressing Towards Goal  Goal: *Lab values improving  Outcome: Progressing Towards Goal

## 2022-03-04 NOTE — PROGRESS NOTES
Patient for discharge today. CM met with patient to discuss discharge needs. Patient denies any additional discharge/supportive care needs at this time. Patient discharged home with RW provided by [facility]. Patient declined [facility] therapy services. Friend to transport. Care Management Interventions  PCP Verified by CM: Yes  Mode of Transport at Discharge: Other (see comment) (Patient's Friend.  )  Transition of Care Consult (CM Consult): Discharge Planning  Discharge Durable Medical Equipment: Yes (RW with [facility]. )  Physical Therapy Consult: Yes  Occupational Therapy Consult: Yes  Support Systems: Spouse/Significant Other  Confirm Follow Up Transport: Friends  The Plan for Transition of Care is Related to the Following Treatment Goals : Return to prior level of functioning. The Patient and/or Patient Representative was Provided with a Choice of Provider and Agrees with the Discharge Plan?: Yes  Name of the Patient Representative Who was Provided with a Choice of Provider and Agrees with the Discharge Plan: Patient.    Resource Information Provided?: No  Discharge Location  Patient Expects to be Discharged to[de-identified] Home

## 2022-03-04 NOTE — PROGRESS NOTES
Hospitalist Progress Note   Admit Date:  2022  1:09 PM   Name:  Priyank Reynolds   Age:  73 y.o. Sex:  female  :  1950   MRN:  [id number]   Room:  [facility]/    Presenting Complaint: Abdominal Pain    Reason(s) for Admission: Acute renal failure (ARF) ([facility] [address] [N17.9]  Bladder cancer [facility]) [C67.9]     Hospital Course & Interval History:   Mrs. 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 She was recently diagnosed with a bladder tumor and underwent cystoscopy with TURBT in December which showed squamous cell carcinoma with extensive necrosis. She had repeat TURBT 21 for debulking. She had recently opted to proceed with radical cystectomy and ileal conduit. She was referred to Cardiology for perioperative risk assessment and routine labs showed hypokalemia and RA so she was referred to the hospital where labs showed K 3.1 and sCr 7.3. She has largely been asymptomatic aside from abdominal pain that has been chronic. Did have oliguria. CT a/p showed R sided hydronephrosis and hydroureter with extrinsic compression from her bladder tumor. Urology consulted who recommended admission and IR consult for percutaneous nephrostomy tube placement. She was started on abx for acute cystitis. Hb on the morning of  dropped from 7.9 to 5.8 though no clinical bleeding noted. Perc nephrostomy placed  with good urine output. Cr improved. Planned for radical cystectomy on 3/1, however instead she underwent ex-lap with ALINE and omental flap creation    Subjective/24hr Events (22):  3/2/2022  Patient had-Exploratory laparotomy with lysis of adhesions and creation of omental flap  Says feels better    3/3/2022  By friend at the bedside  Patient says she feels better, mild discomfort over the OpSite at times    3/4/2022  Says doing good, discharged by urology. Mildly decreased magnesium of 1.7.     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 Percutaneous nephrostomy placed 2/24, ongoing urine output and Cr has improved to 1.5 on 3/1.              - S/p ex-lap on 3/1 with ALINE and creation of omental flap, bladder was immobile so she did not undergo cystectomy. No urinary diversion since she already had perc nephrostomy tube. 3/2/2022- Later today ventura dced by urology  Oncology evaluated pt today  3/3/2022-improving kidney function, added NS  3/4/2022-Marked improved RA    #Hypomagnesemia  Replace with tubular magnesium sulfate. Ordered p.o. magnesium hydroxide for home. Advised to repeat CBC BMP and magnesium in a week with the primary care physician     # Iron deficiency anemia              - Baseline Hb ~8 since January, did drop to 5.8 and was transfused, though no clinical bleeding or hematoma, etc noted on CT at admission. PPI was stopped. Hb has since been stable.     # Constipation              - Resolved. Bowel regimen now PRN.    # HypoK              - KEZWKWOT.     # Acute uncomplicated cystitis/pyuria              - Con't Rocephin. Culture neg to date.     # Squamous cell carcinoma of the bladder              - S/p TURBT x2. Radical cystectomy on Tues 3/1     # H/o tobacco abuse              - Quit ~20 years ago     Dispo/Discharge Planning: Hopefully home soon. Diet:  ADULT DIET Regular; Low Potassium (Less than 3000 mg/day)  ADULT ORAL NUTRITION SUPPLEMENT AM Snack, PM Snack; Other Supplement;  Ensure Surgery Immunonutrition- starting POD 1 - POD 5  DVT PPx: SCDs, ambulation  Code status: Full Code         Hospital Problems as of 3/4/2022 Date Reviewed: 2/22/2022          Codes Class Noted - Resolved POA    * (Principal) Acute renal failure (ARF) ([facility] [address] ICD-10-CM: N17.9  ICD-9-CM: 584.9  2/23/2022 - Present Yes        Kidney congenitally absent, left ICD-10-CM: Q60.0  ICD-9-CM: 753.0  2/23/2022 - Present Yes        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 [facility]) ICD-10-CM: N13.30, C67.9  ICD-9-CM: 591, 188.9  2/23/2022 - Present Yes        Systolic CHF, chronic (HCC) ICD-10-CM: I50.22  ICD-9-CM: 428.22, 428.0  2/2/2022 - Present Yes    Overview Signed 2/2/2022  8:06 AM by Griffin Arnett MD     Echo 2019 [place] 45% with no severe valvular pathology             THAO (obstructive sleep apnea) ICD-10-CM: G47.33  ICD-9-CM: 327.23  2/2/2022 - Present Yes        Hematuria ICD-10-CM: R31.9  ICD-9-CM: 599.70  12/30/2021 - Present Yes        Bladder cancer ([facility] [address] ICD-10-CM: C67.9  ICD-9-CM: 188.9  12/30/2021 - Present Yes        RESOLVED: Constipation ICD-10-CM: K59.00  ICD-9-CM: 564.00  2/27/2022 - 2/27/2022 Unknown        RESOLVED: Hypokalemia ICD-10-CM: E87.6  ICD-9-CM: 276.8  2/23/2022 - 2/27/2022 Yes        RESOLVED: Acute UTI ICD-10-CM: N39.0  ICD-9-CM: 599.0  2/23/2022 - 2/27/2022 Yes              Objective:     Patient Vitals for the past 24 hrs:   Temp Pulse Resp BP SpO2   03/04/22 1116 97.7 °F (36.5 °C) 97 17 94/75 98 %   03/04/22 0733 97.5 °F (36.4 °C) 83 17 106/63 96 %   03/04/22 0316 97.8 °F (36.6 °C) 96 18 (!) 91/53 97 %   03/03/22 2322 98.1 °F (36.7 °C) 87 18 105/69 94 %   03/03/22 1929 98.3 °F (36.8 °C) 96 16 (!) 93/57 96 %   03/03/22 1600 97.7 °F (36.5 °C) 98 16 (!) 103/57 94 %     Oxygen Therapy  O2 Sat (%): 98 % (03/04/22 1116)  Pulse via Oximetry: 77 beats per minute (03/01/22 1105)  O2 Device: None (Room air) (03/03/22 1315)  Skin Assessment: Clean, dry, & intact (03/01/22 1105)  O2 Flow Rate (L/min): 2 l/min (03/01/22 1105)  ETCO2 (mmHg): 22 mmHg (02/24/22 1230)    Estimated body mass index is 25.44 kg/m² as calculated from the following:    Height as of this encounter: 5' 2\" (1.575 m). Weight as of this encounter: 63.1 kg (139 lb 1.6 oz).     Intake/Output Summary (Last 24 hours) at 3/4/2022 1433  Last data filed at 3/4/2022 1024  Gross per 24 hour Intake 237 ml   Output 1300 ml   Net -1063 ml         Physical Exam:     Blood pressure 94/75, pulse 97, temperature 97.7 °F (36.5 °C), resp. rate 17, height 5' 2\" (1.575 m), weight 63.1 kg (139 lb 1.6 oz), SpO2 98 %. General:    Well nourished. No overt distress  Head:  Normocephalic, atraumatic  Eyes:  Sclerae appear normal.  Pupils equally round. ENT:  Nares appear normal, no drainage. Moist oral mucosa  Neck:  No restricted ROM. Trachea midline   CV:   RRR. No m/r/g. No jugular venous distension. Lungs:   CTAB. No wheezing, rhonchi, or rales. Respirations even, unlabored  Abdomen: Bowel sounds present. Soft, nontender, nondistended. Rt nephrostomy tube  Midline incision site intact, minimal tenderness. Extremities: No cyanosis or clubbing. No edema  Skin:     No rashes and normal coloration. Warm and dry. Neuro:  CN II-XII grossly intact. Sensation intact. A&Ox3  Psych:  Normal mood and affect. I have reviewed ordered lab tests and independently visualized imaging below:    Recent Labs:  Recent Results (from the past 48 hour(s))   CBC WITH AUTOMATED DIFF    Collection Time: 03/03/22  5:38 AM   Result Value Ref Range    WBC 12.1 (H) 4.3 - 11.1 K/uL    RBC 3.20 (L) 4.05 - 5.2 M/uL    HGB 8.1 (L) 11.7 - 15.4 g/dL    HCT 27.4 (L) 35.8 - 46.3 %    MCV 85.6 79.6 - 97.8 FL    MCH 25.3 (L) 26.1 - 32.9 PG    MCHC 29.6 (L) 31.4 - 35.0 g/dL    RDW 16.9 (H) 11.9 - 14.6 %    PLATELET 579 595 - 090 K/uL    MPV 9.5 9.4 - 12.3 FL    ABSOLUTE NRBC 0.00 0.0 - 0.2 K/uL    DF AUTOMATED      NEUTROPHILS 82 (H) 43 - 78 %    LYMPHOCYTES 11 (L) 13 - 44 %    MONOCYTES 6 4.0 - 12.0 %    EOSINOPHILS 0 (L) 0.5 - 7.8 %    BASOPHILS 0 0.0 - 2.0 %    IMMATURE GRANULOCYTES 1 0.0 - 5.0 %    ABS. NEUTROPHILS 9.9 (H) 1.7 - 8.2 K/UL    ABS. LYMPHOCYTES 1.4 0.5 - 4.6 K/UL    ABS. MONOCYTES 0.7 0.1 - 1.3 K/UL    ABS. EOSINOPHILS 0.1 0.0 - 0.8 K/UL    ABS. BASOPHILS 0.0 0.0 - 0.2 K/UL    ABS. IMM.  GRANS. 0.1 0.0 - 0.5 K/UL   METABOLIC PANEL, BASIC    Collection Time: 03/03/22  5:38 AM   Result Value Ref Range    Sodium 143 136 - 145 mmol/L    Potassium 3.9 3.5 - 5.1 mmol/L    Chloride 107 98 - 107 mmol/L    CO2 34 (H) 21 - 32 mmol/L    Anion gap 2 (L) 7 - 16 mmol/L    Glucose 88 65 - 100 mg/dL    BUN 30 (H) 8 - 23 MG/DL    Creatinine 1.20 (H) 0.6 - 1.0 MG/DL    GFR est AA 57 (L) >60 ml/min/1.73m2    GFR est non-AA 47 (L) >60 ml/min/1.73m2    Calcium 8.7 8.3 - 10.4 MG/DL   MAGNESIUM    Collection Time: 03/03/22  5:38 AM   Result Value Ref Range    Magnesium 1.8 1.8 - 2.4 mg/dL   CBC WITH AUTOMATED DIFF    Collection Time: 03/04/22  5:47 AM   Result Value Ref Range    WBC 12.1 (H) 4.3 - 11.1 K/uL    RBC 3.63 (L) 4.05 - 5.2 M/uL    HGB 9.4 (L) 11.7 - 15.4 g/dL    HCT 30.6 (L) 35.8 - 46.3 %    MCV 84.3 79.6 - 97.8 FL    MCH 25.9 (L) 26.1 - 32.9 PG    MCHC 30.7 (L) 31.4 - 35.0 g/dL    RDW 17.6 (H) 11.9 - 14.6 %    PLATELET 560 883 - 209 K/uL    MPV 9.5 9.4 - 12.3 FL    ABSOLUTE NRBC 0.00 0.0 - 0.2 K/uL    DF AUTOMATED      NEUTROPHILS 79 (H) 43 - 78 %    LYMPHOCYTES 12 (L) 13 - 44 %    MONOCYTES 6 4.0 - 12.0 %    EOSINOPHILS 2 0.5 - 7.8 %    BASOPHILS 1 0.0 - 2.0 %    IMMATURE GRANULOCYTES 1 0.0 - 5.0 %    ABS. NEUTROPHILS 9.6 (H) 1.7 - 8.2 K/UL    ABS. LYMPHOCYTES 1.4 0.5 - 4.6 K/UL    ABS. MONOCYTES 0.8 0.1 - 1.3 K/UL    ABS. EOSINOPHILS 0.2 0.0 - 0.8 K/UL    ABS. BASOPHILS 0.1 0.0 - 0.2 K/UL    ABS. IMM.  GRANS. 0.1 0.0 - 0.5 K/UL   METABOLIC PANEL, BASIC    Collection Time: 03/04/22  5:47 AM   Result Value Ref Range    Sodium 142 136 - 145 mmol/L    Potassium 4.8 3.5 - 5.1 mmol/L    Chloride 108 (H) 98 - 107 mmol/L    CO2 27 21 - 32 mmol/L    Anion gap 7 7 - 16 mmol/L    Glucose 79 65 - 100 mg/dL    BUN 28 (H) 8 - 23 MG/DL    Creatinine 1.00 0.6 - 1.0 MG/DL    GFR est AA >60 >60 ml/min/1.73m2    GFR est non-AA 58 (L) >60 ml/min/1.73m2    Calcium 8.8 8.3 - 10.4 MG/DL   MAGNESIUM    Collection Time: 03/04/22  5:47 AM   Result Value Ref Range    Magnesium 1.7 (L) 1.8 - 2.4 mg/dL       All Micro Results     Procedure Component Value Units Date/Time    CULTURE, BLOOD [[id number]] Collected: 02/23/22 1558    Order Status: Completed Specimen: Blood Updated: 02/28/22 1435     Special Requests: --        RIGHT  ARM       Culture result: NO GROWTH 5 DAYS       CULTURE, BLOOD [[id number]] Collected: 02/23/22 1558    Order Status: Completed Specimen: Blood Updated: 02/28/22 1435     Special Requests: --        LEFT  FOREARM       Culture result: NO GROWTH 5 DAYS       CULTURE, URINE [[id number]] Collected: 02/23/22 1410    Order Status: Completed Specimen: Cath Urine Updated: 02/26/22 0704     Special Requests: NO SPECIAL REQUESTS        Culture result:       >100,000 COLONIES/mL NORMAL SKIN KAILASH ISOLATED          COVID-19 RAPID TEST [[id number]] Collected: 02/23/22 1819    Order Status: Completed Specimen: Nasopharyngeal Updated: 02/23/22 1931     Specimen source NASAL        COVID-19 rapid test Not detected        Comment:      The specimen is NEGATIVE for SARS-CoV-2, the novel coronavirus associated with COVID-19. A negative result does not rule out COVID-19. This test has been authorized by the FDA under an Emergency Use Authorization (EUA) for use by authorized laboratories. Fact sheet for Healthcare Providers: ConventionUpdate.co.nz  Fact sheet for Patients: ConventionUpdate.co.nz       Methodology: Isothermal Nucleic Acid Amplification               Other Studies:  No results found.     Current Meds:  Current Facility-Administered Medications   Medication Dose Route Frequency    magnesium oxide (MAG-OX) tablet 400 mg  400 mg Oral BID    0.9% sodium chloride infusion  100 mL/hr IntraVENous CONTINUOUS    lidocaine (XYLOCAINE) 10 mg/mL (1 %) injection 0.1 mL  0.1 mL SubCUTAneous PRN    sodium chloride (NS) flush 5-40 mL  5-40 mL IntraVENous Q8H    sodium chloride (NS) flush 5-40 mL  5-40 mL IntraVENous Q8H    sodium chloride (NS) flush 5-40 mL  5-40 mL IntraVENous PRN    HYDROmorphone (DILAUDID) injection 0.5 mg  0.5 mg IntraVENous Q4H PRN    naloxone (NARCAN) injection 0.4 mg  0.4 mg IntraVENous PRN    aspirin chewable tablet 81 mg  81 mg Oral DAILY    acetaminophen (TYLENOL) tablet 1,000 mg  1,000 mg Oral Q6H    diphenhydrAMINE (BENADRYL) capsule 25 mg  25 mg Oral Q6H PRN    oxyCODONE IR (ROXICODONE) tablet 5 mg  5 mg Oral Q4H PRN    ondansetron (ZOFRAN ODT) tablet 4 mg  4 mg Oral Q4H PRN    heparin (porcine) injection 5,000 Units  5,000 Units SubCUTAneous Q8H    lip protectant (BLISTEX) ointment 1 Each  1 Each Topical PRN    naloxegoL (MOVANTIK) tablet 12.5 mg  12.5 mg Oral DAILY    polyethylene glycol (MIRALAX) packet 17 g  17 g Oral BID PRN    senna (SENOKOT) tablet 17.2 mg  2 Tablet Oral BID PRN    sodium chloride (NS) flush 5-40 mL  5-40 mL IntraVENous Q8H    ondansetron (ZOFRAN) injection 4 mg  4 mg IntraVENous Q6H PRN     Current Outpatient Medications   Medication Sig    HYDROcodone-acetaminophen (NORCO) 5-325 mg per tablet Take 1 Tablet by mouth every eight (8) hours as needed for Pain for up to 7 days. Max Daily Amount: 3 Tablets.  magnesium oxide (MAG-OX) 400 mg tablet Take 1 Tablet by mouth two (2) times a day.        Signed:  Maureen Carpio MD

## 2022-03-07 NOTE — PROGRESS NOTES
3-7-22     ERAS  POD 6    Post Discharge Phone Call    Patient completed Ensure Surgery Immunonutrition drinks. Tolerating diet w/o any c/o n/v. Having 1-2 BM's per day. Right nephrostomy drain with yellowish urine and no visible blood per patient. No drainage at insertion site of Nephro tube. Patient states is changing dry dressing as instructed. Abdominal incision is C/D/I w/o any erythema or drainage. Pain is 5/10 controlled with Norco (1 pill per day). States is doing light walking with rolling walker and using IS (able to get over 1000 ml total volume). Follow up with Dr. Toan Curtis on 3-25-22.

## 2022-03-08 NOTE — PROCEDURES
Department of Interventional Radiology  [phone]        Interventional Radiology Brief Procedure Note    Patient: Natalie Borrero MRN: [id number]  SSN: [id number]    YOB: 1950  Age: 73 y.o.   Sex: female      Date of Procedure: 3/8/2022    Pre-Procedure Diagnosis: bladder cancer    Post-Procedure Diagnosis: SAME    Procedure(s): Venous Chest Port Placement    Brief Description of Procedure: as above    Performed By: Иван Beauchamp PA-C     Assistants: None    Anesthesia:TIVS/MAC    Estimated Blood Loss: Less than 10ml    Specimens:  None    Implants:  Chest Port Placement    Findings: catheter tip in right atrium     Complications: None    Recommendations: ok to use port     Follow Up: prn    Signed By: Иван Beauchamp PA-C     March 8, 2022

## 2022-03-08 NOTE — ANESTHESIA POSTPROCEDURE EVALUATION
* No procedures listed *.    total IV anesthesia    Anesthesia Post Evaluation        Patient location during evaluation: PACU  Patient participation: complete - patient participated  Level of consciousness: awake  Pain management: satisfactory to patient  Airway patency: patent  Anesthetic complications: no  Cardiovascular status: hemodynamically stable  Respiratory status: spontaneous ventilation  Hydration status: euvolemic  Post anesthesia nausea and vomiting:  none      INITIAL Post-op Vital signs:   Vitals Value Taken Time   /85 03/08/22 1345   Temp     Pulse 91 03/08/22 1346   Resp 16 03/08/22 1339   SpO2 100 % 03/08/22 1339   Vitals shown include unvalidated device data.

## 2022-03-08 NOTE — PROGRESS NOTES
Patient arrived to to IR procedure room 2. Anesthesia personnel are present; please refer to anesthesia flow sheet.

## 2022-03-08 NOTE — DISCHARGE INSTRUCTIONS
[address]  Department of Interventional Radiology  [facility]  [phone] Office  [phone] Fax  Implanted Port Discharge Instructions      General Instructions:   A port is like an implanted IV. They are usually ordered for patients who will be getting chemotherapy, but can also be used as an IV for long term antibiotics, large amounts of fluids, and/or blood products. Your blood can be drawn from your port for labs also. Those patients who do not have good veins find the ports convenient as they can get the IV they need with one stick. The port can be used long term, and the care is easy. The device is under the skin, and once the skin heals, care is minimal. All that is required is the nurse who accesses the port will need to flush it with heparinized saline after each use. Ports are usually placed in the chest wall, usually on the right side. But they can be place in the arms and in the abdomen. Home Care Instructions: If your port is in your arm, do not allow blood pressure or other IVs to be place in that arm. Do not allow bra straps or any clothing to rub the skin over the port. Do not bathe or swim until the skin has healed and if the port is accessed. Once it is healed, and when the port is not accessed, it is okay to bathe and swim. Restrict yourself to light activity for the first 5 days after getting the port put in, after that, resume normal activity slowly. You may resume your normal diet and medications. Follow-Up Instructions: Please see your oncologist, or whatever physician ordered the port as he/she has requested of you. Call If: You should call your Physician and/or the Radiology Nurse if you notice redness, pus, swelling, or pain from the area of your incision. Call if you should develop a fever. The nurses who access your port will know to call your doctor if the port does not seem to be working properly. You need to tell the nurses who use the port if you should have any pain or swelling at the site during an infusion. To Reach Us: If you have any questions about your procedure, please call the Interventional Radiology department at [phone]. After business hours (5pm) and weekends, call the answering service at [phone] and ask for the Radiologist on call to be paged. Si tiene Preguntas acerca del procedimiento, por favor llame al departamento de Radiología Intervencional al [phone]. Después de horas de oficina (5 pm) y los fines de [place], llamar al Jaime Meron Cheema al [phone] y pregunte por el Radiologo de [facility] Qu[facility]o. Interventional Radiology General Nurse Discharge    After general anesthesia or intravenous sedation, for 24 hours or while taking prescription Narcotics:  · Limit your activities  · Do not drive and operate hazardous machinery  · Do not make important personal or business decisions  · Do  not drink alcoholic beverages  · If you have not urinated within 8 hours after discharge, please contact your surgeon on call. * Please give a list of your current medications to your Primary Care Provider. * Please update this list whenever your medications are discontinued, doses are     changed, or new medications (including over-the-counter products) are added. * Please carry medication information at all times in case of emergency situations. These are general instructions for a healthy lifestyle:    No smoking/ No tobacco products/ Avoid exposure to second hand smoke  Surgeon General's Warning:  Quitting smoking now greatly reduces serious risk to your health.     Obesity, smoking, and sedentary lifestyle greatly increases your risk for illness  A healthy diet, regular physical exercise & weight monitoring are important for maintaining a healthy lifestyle    You may be retaining fluid if you have a history of heart failure or if you experience any of the following symptoms:  Weight gain of 3 pounds or more overnight or 5 pounds in a week, increased swelling in our hands or feet or shortness of breath while lying flat in bed. Please call your doctor as soon as you notice any of these symptoms; do not wait until your next office visit. Recognize signs and symptoms of STROKE:  F-face looks uneven    A-arms unable to move or move unevenly    S-speech slurred or non-existent    T-time-call 911 as soon as signs and symptoms begin-DO NOT go       Back to bed or wait to see if you get better-TIME IS BRAIN.

## 2022-03-08 NOTE — PROGRESS NOTES
Dr. Kristen Jimenez patient for discharge. Recovery period without difficulty. Pt alert and oriented and denies pain. Dressing is clean, dry, and intact. Reviewed discharge instructions with patient, verbalized understanding. [facility] Vital signs and Beulah score completed.

## 2022-03-08 NOTE — ANESTHESIA PREPROCEDURE EVALUATION
Relevant Problems   RESPIRATORY SYSTEM   (+) THAO (obstructive sleep apnea)      RENAL FAILURE   (+) Acute renal failure (ARF) (HCC)   (+) 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 (HCC)   (+) Kidney congenitally absent, left      PERSONAL HX & FAMILY HX OF CANCER   (+) Bladder cancer (HCC)       Anesthetic History   No history of anesthetic complications            Review of Systems / Medical History  Patient summary reviewed and pertinent labs reviewed    Pulmonary        Sleep apnea: No treatment  Smoker (Former heavy smoker)         Neuro/Psych   Within defined limits           Cardiovascular          CHF        Exercise tolerance: <4 METS: Patient deconditioned, uses walker  Comments: ECHO 2/22 - EF 45%. Mild MR/TR.   RVSP 32     GI/Hepatic/Renal         Renal disease (RA - renal function improving (s/p perc neph)): ARF       Endo/Other        Cancer (bladder) and anemia (9.4)     Other Findings            Physical Exam    Airway  Mallampati: II  TM Distance: 4 - 6 cm  Neck ROM: normal range of motion   Mouth opening: Normal     Cardiovascular    Rhythm: regular  Rate: abnormal        Comments: Slightly tachy but regular Dental    Dentition: Poor dentition  Comments: VERY POOR DENTITION   Pulmonary      Decreased breath sounds: bilateral           Abdominal  GI exam deferred       Other Findings            Anesthetic Plan    ASA: 3  Anesthesia type: total IV anesthesia          Induction: Intravenous  Anesthetic plan and risks discussed with: Patient

## 2022-03-08 NOTE — PROGRESS NOTES
TRANSFER - OUT REPORT:    Verbal report given to Usama Arnold RN on [place] Stade  being transferred to IR prep room 6 for routine post - op       Report consisted of patients Situation, Background, Assessment and   Recommendations(SBAR). Information from the following report(s) SBAR, Kardex, Procedure Summary and MAR was reviewed with the receiving nurse. Lines:   Venous Access Device Bard Power Port 03/08/22 Upper chest (subclavicular area, right (Active)       Peripheral IV 03/08/22 Anterior; Left Forearm (Active)        Opportunity for questions and clarification was provided.

## 2022-03-17 PROBLEM — G56.01 CARPAL TUNNEL SYNDROME OF RIGHT WRIST: Status: ACTIVE | Noted: 2019-09-26

## 2022-03-17 PROBLEM — F51.01 PRIMARY INSOMNIA: Status: ACTIVE | Noted: 2017-11-20

## 2022-03-30 NOTE — PROGRESS NOTES
I spoke with Teetee Swann via the telephone regarding her [address] insurance coverage, potential oral medication authorizations, enrollment in the [employer] ([facility] and the [address] [place] [address] ([address]), and assistance organization resource sheet. The patient will review the cancer programs. Next, I spoke with Teetee Swann regarding the Oncology Care Model Notification Letter. I answered questions regarding the costs associated with Medicare Benefits. I explained to Teetee Swann the estimated cost of treatment and services for six months under the Medicare billing policies. Teetee Swann was advised to contact Medicare or their healthcare provider for questions or concerns related to service of care. The Oncology Care Model provides different options of contact for Teetee Swann regarding concerns and complaints of treatments and services. The cost of 6 months of medical treatments and services can be estimated to be $4,068.00. Next, I spoke with Teetee  regarding her Prescription Drug Benefits. Next, I spoke with Teetee Swann regarding the patient assistance program.   Next, I spoke with Teetee Swann about billing questions and treatment services. We discussed copayments, deductibles, and out of pocket maximums. Next, I spoke with Teetee Swann regarding the Limited Power of [employer] document. After reading the form, Teetee Swann stated she would sign the Limited Power of  document at her next visit. We discussed the LIS Program and applying for Medicaid. Next, we discussed costs associated with the Gemzar and Carboplatin Medications. Next, I spoke with Teetee Swann regarding potential oral medication authorizations. I told her that if she ever had any problems getting her oral medications filled to give the dedicated [facility]  a call.  Most of the time, it is simply an authorization that needs to be done with the insurance company. Lastly, I gave Cameron Lujan a form with various resource organizations that could assist with specific needs (example:  transportation, lodging, preparing meals, home cleaning)    Cameron Lujan expressed understanding of the information above and all questions were answered to her satisfaction.

## 2022-04-04 PROBLEM — R79.89 ELEVATED SERUM CREATININE: Status: ACTIVE | Noted: 2022-01-01

## 2022-04-04 NOTE — PROGRESS NOTES
Pt arrived ambulatory, 1L NS and potassium completed, pt tolerated well, port needle left in place per pt request, instructed pt to keep port dry, pt verbalized understanding, discharged home ambulatory aware of appt tomorrow at 0800

## 2022-04-04 NOTE — PROGRESS NOTES
4/4/22 saw pt today with Teddi Dandy, NP for pre chemo c1d1 carbo/gem for bladder cancer. See pt instructions for symptom management. IVF today. Potassium and magnesium sent to pharmacy. Provided opportunity to ask questions and all were discussed. Infusion tomorrow. Follow up in 1 week. Encouraged to call with any concerns. Navigation will continue to follow.

## 2022-04-05 NOTE — PROGRESS NOTES
I spoke with Adriana Ayoub regarding questions about the FAP application. Patient will provide bank statement and social security benefit letter for the FAP application. Adriana Ayoub signed the LPOA.

## 2022-04-05 NOTE — PROGRESS NOTES
Pt arrived ambulatory. Labs drawn from port. Repeat Creatinine 1.6. John Gongora NP notified and ordered 1 liter NS to run with chemo today. Premeds, Carbo, Gemzar and 1 liter NS infused as orered. Pt tolerated well. Pt aware of next appt 4/12 at 1530. Discharged ambulatory, no dostress noted.   Patient instructed to call provider with temperature of 100.4 or greater or nausea/vomiting/ diarrhea or pain not controlled by medications

## 2022-04-12 NOTE — ADDENDUM NOTE
Encounter addended by: Kaelyn Cleaning, [facility] on: 4/12/2022 3:57 PM   Actions taken: i-Vent created or edited

## 2022-04-12 NOTE — PROGRESS NOTES
Arrived to the [facility]. Gemzar infusing. Patient tolerating well. Any issues or concerns during appointment: Patient's  upon arrival. Patient states she felt momentarily lightheaded when she got here and states that she periodically has these episodes. HR 72, but irregular when palpated radially. Patient also reports that she has had 5 episodes of diarrhea today (improving now), but attributes all of these symptoms to her \"nerves\". Dr. Lux Garces notified. Okay to proceed with treatment today and order given for 1L NS bolus. Patient aware of next infusion appointment on 4/26/2022 (date) at 5 am (time). Patient aware of next lab and [facility] office visit on 4/25/2022 (date) at 12 pm (time). Patient instructed to call provider with temperature of 100.4 or greater or nausea/vomiting/ diarrhea or pain not controlled by medications  Report given to MATT Talbert.

## 2022-04-12 NOTE — ADDENDUM NOTE
Encounter addended by: Bryant Liz RPH on: 4/12/2022 3:51 PM   Actions taken: i-Natalya created or edited

## 2022-04-12 NOTE — PROGRESS NOTES
Report received from [place] [id number]. Pt tolerated Gemzar well, port needle removed. Pt taken discharge via wheelchair accompanied by pts friend.

## 2022-04-13 PROBLEM — R65.10 SIRS (SYSTEMIC INFLAMMATORY RESPONSE SYNDROME) (HCC): Status: ACTIVE | Noted: 2022-01-01

## 2022-04-13 PROBLEM — D69.6 THROMBOCYTOPENIA (HCC): Status: ACTIVE | Noted: 2022-01-01

## 2022-04-13 PROBLEM — R41.82 AMS (ALTERED MENTAL STATUS): Status: ACTIVE | Noted: 2022-01-01

## 2022-04-13 PROBLEM — D64.9 ANEMIA: Status: ACTIVE | Noted: 2022-01-01

## 2022-04-13 PROBLEM — R41.82 ALTERED MENTAL STATUS: Status: ACTIVE | Noted: 2022-01-01

## 2022-04-13 PROBLEM — N17.9 ACUTE KIDNEY INJURY (HCC): Status: ACTIVE | Noted: 2022-01-01

## 2022-04-13 NOTE — ED TRIAGE NOTES
Pt arrives via [facility] from the [employer] on [address] [facility]. Per EMS pt's spouse called EMS after coming home to find pt in bed minimally responsive. EMS reports pt's spouse said pt was normal this AM around 0800. Upon EMS arrival pt was found to be responsive to pain only. . T 99 axillary. /80. Pt has stage 4 bladder cancer.

## 2022-04-14 PROBLEM — G93.41 METABOLIC ENCEPHALOPATHY: Status: ACTIVE | Noted: 2022-01-01

## 2022-04-14 PROBLEM — E87.20 METABOLIC ACIDOSIS, NORMAL ANION GAP (NAG): Status: ACTIVE | Noted: 2022-01-01

## 2022-04-14 PROBLEM — I63.9 STROKE (HCC): Status: ACTIVE | Noted: 2022-01-01

## 2022-04-14 PROBLEM — D61.818 PANCYTOPENIA (HCC): Status: ACTIVE | Noted: 2022-01-01

## 2022-04-14 NOTE — ED PROVIDER NOTES
79-year-old female with history of bladder cancer, chronic pain, congestive heart failure, sleep apnea presents with altered mental status since awaking this morning.  states she was normal upon going to bed last night. She has had multiple episodes of diarrhea since yesterday. He states she was confused, somnolent, and not recognize him today. She has been tremulous. He denies any vomiting or complaints of pain. No documented fevers. She has had a cough with some difficulty breathing today. Unable to get further history from patient due to altered mental status. Last oncology note 4/11 Dr Durant Nurse:  \"ASSESSMENT:  This woman has a locally advanced and unresectable bladder cancer. Given those findings, she was a candidate for systemic chemotherapy. Although a cisplatin-based regimen would be ideal, I wasvery much concerned about her tolerance of an intense regimen. As a result, she was placed on gemcitabine and carboplatin. She tolerated the first dose of this well. She is due for a dose of gemcitabine tomorrow. Her counts at the present time however are somewhat borderline and I do not yet have a differential to make a final determination. In addition, the patient has been taking ibuprofen for her pelvic discomfort. Her creatinine is now up to 2.1 perhaps related to this.     PLAN:  We will review her differential and determine whether she can receive gemcitabine tomorrow. If not, she will just return in 2 weeks. She is a potential candidate for immunotherapy, erdafitinib, and other agents such as enfortumab and sacituzumab govitecan down the line. I have prescribed Norco for her discomfort. \"      Altered mental status          Past Medical History:   Diagnosis Date    Cancer ([facility] [address] 12/2021    bladder     Chronic pain     Back    Heart failure ([facility] [address]     Echo on 02/11/2022 showed Reduced left ventricular systolic function with a visually estimated EF of 45 - 50%. Normal diastolic function.  Sleep apnea     can not destinee c pap at hs       Past Surgical History:   Procedure Laterality Date    HX APPENDECTOMY      HX HERNIA REPAIR      HX HYSTERECTOMY      HX LAP CHOLECYSTECTOMY      HX ORTHOPAEDIC  1984    toe transplant--- toe removed from left foot and placed on left hand    IR INSERT TUNL CVC W PORT OVER 5 YEARS  3/8/2022    IR NEPHROSTOMY PERC RT PLC CATH  SI  2022    NEUROLOGICAL PROCEDURE UNLISTED      back surgery         Family History:   Problem Relation Age of Onset    Cancer Mother         colon    Cancer Father         lung    Cancer Brother         colon with mets to bladder    Cancer Paternal Uncle        Social History     Socioeconomic History    Marital status:      Spouse name: Not on file    Number of children: Not on file    Years of education: Not on file    Highest education level: Not on file   Occupational History    Not on file   Tobacco Use    Smoking status: Former Smoker     Packs/day: 1.00     Years: 50.00     Pack years: 50.00     Quit date:      Years since quittin.2    Smokeless tobacco: Never Used    Tobacco comment: began smoking age 17   Vaping Use    Vaping Use: Never used   Substance and Sexual Activity    Alcohol use: Never    Drug use: Never    Sexual activity: Not on file   Other Topics Concern    Not on file   Social History Narrative    Not on file     Social Determinants of Health     Financial Resource Strain:     Difficulty of Paying Living Expenses: Not on file   Food Insecurity:     Worried About [address] in the Last Year: Not on file    [address] in the Last Year: Not on file   Transportation Needs:     Lack of Transportation (Medical): Not on file    Lack of Transportation (Non-Medical):  Not on file   Physical Activity:     Days of Exercise per Week: Not on file    Minutes of Exercise per Session: Not on file   Stress:     Feeling of Stress : Not on file   Social Connections:     Frequency of Communication with Friends and Family: Not on file    Frequency of Social Gatherings with Friends and Family: Not on file    Attends Islam Services: Not on file    Active Member of Clubs or Organizations: Not on file    Attends Club or Organization Meetings: Not on file    Marital Status: Not on file   Intimate Partner Violence:     Fear of Current or Ex-Partner: Not on file    Emotionally Abused: Not on file    Physically Abused: Not on file    Sexually Abused: Not on file   Housing Stability:     Unable to Pay for Housing in the Last Year: Not on file    Number of [place] in the Last Year: Not on file    Unstable Housing in the Last Year: Not on file         ALLERGIES: Doxycycline, Fentanyl, and Morphine    Review of Systems   Unable to perform ROS: Mental status change   Gastrointestinal: Positive for diarrhea. Vitals:    04/13/22 1912 04/13/22 1919 04/13/22 2015 04/13/22 2016   BP: 111/75 117/60 117/78    Pulse: (!) 130 (!) 114 (!) 104 (!) 108   Resp: 17 16 13 18   Temp: 98.7 °F (37.1 °C)      SpO2: 97%  97% 100%            Physical Exam  Vitals and nursing note reviewed. Constitutional:       Appearance: Normal appearance. She is well-developed. Comments: Chronically ill-appearing   HENT:      Head: Normocephalic and atraumatic. Nose: Nose normal.      Mouth/Throat:      Mouth: Mucous membranes are dry. Eyes:      Pupils: Pupils are equal, round, and reactive to light. Cardiovascular:      Rate and Rhythm: Regular rhythm. Tachycardia present. Heart sounds: Normal heart sounds. Pulmonary:      Effort: Pulmonary effort is normal.      Breath sounds: Rhonchi present. Abdominal:      Palpations: Abdomen is soft. Tenderness: There is no abdominal tenderness. Comments: Nephrostomy   Musculoskeletal:         General: No deformity. Normal range of motion. Cervical back: Normal range of motion and neck supple.    Skin:     General: Skin is warm and dry. Coloration: Skin is pale. Neurological:      General: No focal deficit present. Mental Status: She is disoriented and confused. Motor: Tremor present. Psychiatric:         Attention and Perception: She is inattentive. Speech: Speech is delayed and slurred. MDM  Number of Diagnoses or Management Options  Diagnosis management comments: Parts of this document were created using dragon voice recognition software. The chart has been reviewed but errors may still be present. I wore appropriate PPE throughout this patient's ED visit. Nakia Thurman MD, 8:24 PM    Patient becoming gradually more responsive since arrival.  Now recognizes  and able to state her name. Last chemo yesterday. Recently started on norco.    10:44 PM  Worsening pancytopenia requiring blood transfusion. Also worsening acute kidney injury. Will treat for UTI. Discussed with hospitalist for admission.        Amount and/or Complexity of Data Reviewed  Clinical lab tests: ordered and reviewed (Results for orders placed or performed during the hospital encounter of 04/13/22  -LACTIC ACID:        Result                      Value             Ref Range           Lactic acid                 2.1 (H)           0.4 - 2.0 MM*  -CBC WITH AUTOMATED DIFF:        Result                      Value             Ref Range           WBC                         2.6 (L)           4.3 - 11.1 K*       RBC                         2.32 (L)          4.05 - 5.2 M*       HGB                         6.1 (LL)          11.7 - 15.4 *       HCT                         19.1 (L)          35.8 - 46.3 %       MCV                         82.3              79.6 - 97.8 *       MCH                         26.3              26.1 - 32.9 *       MCHC                        31.9              31.4 - 35.0 *       RDW                         18.8 (H)          11.9 - 14.6 %       PLATELET                    25 (LL)           150 - 450 K/*       MPV                         10.9              9.4 - 12.3 FL       ABSOLUTE NRBC               0.00              0.0 - 0.2 K/*       DF                          PENDING                          -METABOLIC PANEL, COMPREHENSIVE:        Result                      Value             Ref Range           Sodium                      143               136 - 145 mm*       Potassium                   3.1 (L)           3.5 - 5.1 mm*       Chloride                    117 (H)           98 - 107 mmo*       CO2                         13 (L)            21 - 32 mmol*       Anion gap                   13                7 - 16 mmol/L       Glucose                     112 (H)           65 - 100 mg/*       BUN                         37 (H)            8 - 23 MG/DL        Creatinine                  2.10 (H)          0.6 - 1.0 MG*       GFR est AA                  30 (L)            >60 ml/min/1*       GFR est non-AA              25 (L)            >60 ml/min/1*       Calcium                     8.7               8.3 - 10.4 M*       Bilirubin, total            0.4               0.2 - 1.1 MG*       ALT (SGPT)                  15                12 - 65 U/L         AST (SGOT)                  17                15 - 37 U/L         Alk.  phosphatase            51                50 - 136 U/L        Protein, total              5.8 (L)           6.3 - 8.2 g/*       Albumin                     1.9 (L)           3.2 - 4.6 g/*       Globulin                    3.9 (H)           2.3 - 3.5 g/*       A-G Ratio                   0.5 (L)           1.2 - 3.5      -PROCALCITONIN:        Result                      Value             Ref Range           Procalcitonin               0.30              0.00 - 0.49 *  -URINALYSIS W/ RFLX MICROSCOPIC:        Result                      Value             Ref Range           Color                       YELLOW                                Appearance                  CLOUDY Specific gravity            1.025 (H)         1.001 - 1.02*       pH (UA)                     6.0               5.0 - 9.0           Protein                     100 (A)           NEG mg/dL           Glucose                     Negative          mg/dL               Ketone                      Negative          NEG mg/dL           Bilirubin                   Negative          NEG                 Blood                       MODERATE (A)      NEG                 Urobilinogen                0.2               0.2 - 1.0 EU*       Nitrites                    Negative          NEG                 Leukocyte Esterase                            NEG             SMALL AMOUNT (A)  -MAGNESIUM:        Result                      Value             Ref Range           Magnesium                   2.0               1.8 - 2.4 mg*  -PHOSPHORUS:        Result                      Value             Ref Range           Phosphorus                  4.2 (H)           2.3 - 3.7 MG*  -TSH 3RD GENERATION:        Result                      Value             Ref Range           TSH                         1.040             0.358 - 3.74*  -RBC, ALLOCATE:        Result                      Value             Ref Range           HISTORY CHECKED? Historical check performed  -TYPE & SCREEN:        Result                      Value             Ref Range           Crossmatch Expiration                                         04/16/2022,2359       ABO/Rh(D)                   O NEGATIVE                            Antibody screen             PENDING                          )  Tests in the radiology section of CPT®: ordered and reviewed (CT HEAD WO CONT    Result Date: 4/13/2022  Noncontrast head CT Clinical Indication: Patient found down, unresponsive, severe weakness, tachycardic. Stage IV bladder cancer. Technique: Noncontrast axial images were obtained through the brain.  All CT scans at this location are performed using dose modulation techniques as appropriate including the following: Automated exposure control, adjustment of the MA and/or kV according to patient's size, or use of iterative reconstruction technique. Reformatted sagittal, coronal images obtained to further evaluate bones, soft tissues, extra-axial spaces. Comparison: None available Findings: There is no acute intracranial hemorrhage, hydrocephalus, intra-axial mass, herniation, midline shift, or mass-effect. Scattered mild nonspecific white matter hypodensities, most often chronic small vessel ischemic change but indeterminate by CT without prior. There is no CT evidence of acute large artery territorial infarction or abnormal extra-axial fluid collection. The mastoid air cells and paranasal sinuses are clear where imaged. No displaced skull fractures are present. 1. No hemorrhage. 2. Nonspecific white matter hypodensities. .     XR CHEST PORT    Result Date: 4/13/2022  Chest portable CLINICAL INDICATION: Currently meets SIRS criteria, hypotensive, stage IV bladder cancer, patient found down with diminished responsiveness and severe weakness COMPARISON: Radiograph 3/1/2022, 12/28/2021 CT and 12/3/2021 radiograph TECHNIQUE: single AP portable view chest at 7:22 PM semiupright FINDINGS: Right portacatheter in place with tip projecting over right atrium. Low lung volume leading to crowding. Patient rotated to the left. Stable mediastinal and hilar contours given technique and positioning. Lungs are unchanged without developing consolidation, pneumothorax, pleural effusion or CHF.      No acute airspace disease.     )  Tests in the medicine section of CPT®: reviewed and ordered           Procedures

## 2022-04-14 NOTE — H&P
[facility] History and Physical       Name:  Mitra Gar  Age:71 y.o. Sex:female   :  1950    MRN:  [id number]   PCP:  AGATHA Anderson    ER Arrival date and time:  2022  7:08 PM   Chief Complaint: \"Pt arrives via [facility] from the [employer] on [address] [facility]. Per EMS pt's spouse called EMS after coming home to find pt in bed minimally responsive. EMS reports pt's spouse said pt was normal this AM around 0800. Upon EMS arrival pt was found to be responsive to pain only. . T 99 axillary. /80. Pt has stage 4 bladder cancer. \"    Reason for Admission:   Metabolic encephalopathy with AMS, multifactorial , in this 74 yo with advanced unresectable bladder cancer, undergoing chemo, now with anemia, thrombocytopenia, electrolyte abnormalities, poss UTI, SIRS criteria. CT Head NEG. No fever, but has tachycardia    Assessment & Plan:     Principal Problem:    SIRS (systemic inflammatory response syndrome) (HCC) (2022)       Active Problems:    Anemia (2022)          Thrombocytopenia ([address] (2022)          UTI (urinary tract infection) (2022)         Acute kidney injury ([address] (2022)          Hematuria (2021)          Bladder cancer ([address] (2021)          Altered mental status (2022)          AMS (altered mental status) (2022)            PLAN:  1)  Admit med bed with remote telemetry, INPATIENT STATUS due to medical complexity, need for close cardiopulmonary monitoring given initial unstable vitals signs (tachycardia) & need for IV medication    2) Will continue with blood transfusion as ordered in ER. Patient and significant other has consented for blood transfusion  3) Monitor platelets. No need for transfusion of platelets at this time    4) Will continue with empiric abx at this time. Follow cultures and de-escalate Abx as appropriate  5) Will place on Neuro checks and check MRI brain in AM. ? whether pain meds or metastatic disease contributing to her altered mental status    6) IVF for metabolic acidosis  7) Consult her oncologist Dr. Hawa Nguyễn for additional input  8) She has long time significant other present in ER, not legally , no family. At this time will keep her full code  9) Consider Palliative Care consult if no improvement. 10 Case management consult for discharge planning    Disposition/Expected LOS: 3-4  Diet: DIET ADULT  VTE ppx: SCD given anemia and thrombocytopenia  Code status: FULL  Surrogate decision-maker: significant other Krupakhalif Cooepr      History of Presenting Illness:     Mara Carroll is a 73 y.o. female with medical history of bladder cancer, chronic pain, congestive heart failure, sleep apnea presents with altered mental status since awaking this morning. Common law  states she was normal upon going to bed last night. She has had multiple episodes of diarrhea since yesterday. He states she was confused, somnolent, and did not recognize him today. She has been tremulous. He denies any vomiting or complaints of pain. No documented fevers. She has had a cough with some difficulty breathing today. Unable to get further history from patient due to altered mental status.     Workup in ER shows Worsening pancytopenia requiring blood transfusion with HGB 6.1 and platelet 25. Also worsening acute kidney injury with creatinine 2.1, up from 1.6 last week. She was empirically treated for UTI. Initial lactic acid 2.1, with tachycardia in ER, but no fever      Last oncology note 4/11 Dr Hawa Nguyễn:  \"ASSESSMENT:  This woman has a locally advanced and unresectable bladder cancer. Given those findings, she was a candidate for systemic chemotherapy. Although a cisplatin-based regimen would be ideal, I wasvery much concerned about her tolerance of an intense regimen. As a result, she was placed on gemcitabine and carboplatin. She tolerated the first dose of this well.   She is due for a dose of gemcitabine tomorrow. Her counts at the present time however are somewhat borderline and I do not yet have a differential to make a final determination. In addition, the patient has been taking ibuprofen for her pelvic discomfort. Her creatinine is now up to 2.1 perhaps related to this. Review of Systems:  Unable to accurately obtain due to mental status        Past Medical History:   Diagnosis Date    Cancer [facility]) 2021    bladder     Chronic pain     Back    Heart failure ([address]     Echo on 2022 showed Reduced left ventricular systolic function with a visually estimated EF of 45 - 50%. Normal diastolic function.  Sleep apnea     can not destinee c pap at hs       Past Surgical History:   Procedure Laterality Date    HX APPENDECTOMY      HX HERNIA REPAIR      HX HYSTERECTOMY      HX LAP CHOLECYSTECTOMY      HX ORTHOPAEDIC  1984    toe transplant--- toe removed from left foot and placed on left hand    IR INSERT TUNL CVC W PORT OVER 5 YEARS  3/8/2022    IR NEPHROSTOMY PERC RT PLC CATH  SI  2022    NEUROLOGICAL PROCEDURE UNLISTED      back surgery       Family History : reviewed  Family History   Problem Relation Age of Onset    Cancer Mother         colon    Cancer Father         lung    Cancer Brother         colon with mets to bladder    Cancer Paternal Uncle         Social History     Tobacco Use    Smoking status: Former Smoker     Packs/day: 1.00     Years: 50.00     Pack years: 50.00     Quit date:      Years since quittin.2    Smokeless tobacco: Never Used    Tobacco comment: began smoking age 17   Substance Use Topics    Alcohol use: Never       Allergies   Allergen Reactions    Doxycycline Rash     Other reaction(s): Rash-Allergy    Fentanyl Rash     Other reaction(s): Rash-Allergy    Morphine Rash     Other reaction(s): Rash-Allergy       There is no immunization history for the selected administration types on file for this patient.       PTA Medications:  Current Outpatient Medications   Medication Instructions    DULoxetine (CYMBALTA) 30 mg capsule No dose, route, or frequency recorded.  HYDROcodone-acetaminophen (NORCO) 5-325 mg per tablet 1 Tablet, Oral, EVERY 6 HOURS AS NEEDED    ketoconazole (NIZORAL) 2 % topical cream APPLY TO THE AFFECTED AREA OF SKIN TWICE DAILY    lidocaine-prilocaine (EMLA) topical cream Topical, AS NEEDED, Apply to port 30 min prior to needle stick    magnesium oxide (MAG-OX) 400 mg, Oral, 2 TIMES DAILY    ondansetron (ZOFRAN ODT) 8 mg, Oral, EVERY 8 HOURS AS NEEDED    potassium chloride (K-DUR, KLOR-CON M20) 20 mEq tablet 20 mEq, Oral, 2 TIMES DAILY    prochlorperazine (COMPAZINE) 10 mg, Oral, EVERY 6 HOURS AS NEEDED       Objective:     Patient Vitals for the past 24 hrs:   Temp Pulse Resp BP SpO2   04/13/22 2016  (!) 108 18  100 %   04/13/22 2015  (!) 104 13 117/78 97 %   04/13/22 1919  (!) 114 16 117/60    04/13/22 1912 98.7 °F (37.1 °C) (!) 130 17 111/75 97 %       Oxygen Therapy  O2 Sat (%): 100 % (04/13/22 2016)  Pulse via Oximetry: 108 beats per minute (04/13/22 2016)  O2 Device: None (Room air) (04/13/22 1958)    There is no height or weight on file to calculate BMI. Physical Exam:    General:  Alert and oriented x 1, mild acute distress, opens eyes, confused. Does not follow commands  HEENT:  Pupils equal and reactive to light and accommodation, oropharynx is clear but dehydrated with poor dentition  Neck:   Supple, no lymphadenopathy, no JVD   Lungs:  Clear to auscultation bilaterally   CV:   Regular rate, regular rhythm, with normal S1 and S2   Abdomen:  Soft, nontender, nondistended, normoactive bowel sounds   Extremities:  No cyanosis clubbing or edema   Neuro:  Nonfocal, A&O x 1        Data Reviewed: I have reviewed all labs, meds, and studies.       Recent Results (from the past 24 hour(s))   LACTIC ACID    Collection Time: 04/13/22  7:20 PM   Result Value Ref Range    Lactic acid 2.1 (H) 0.4 - 2.0 MMOL/L   CBC WITH AUTOMATED DIFF    Collection Time: 04/13/22  7:20 PM   Result Value Ref Range    WBC 2.6 (L) 4.3 - 11.1 K/uL    RBC 2.32 (L) 4.05 - 5.2 M/uL    HGB 6.1 (LL) 11.7 - 15.4 g/dL    HCT 19.1 (L) 35.8 - 46.3 %    MCV 82.3 79.6 - 97.8 FL    MCH 26.3 26.1 - 32.9 PG    MCHC 31.9 31.4 - 35.0 g/dL    RDW 18.8 (H) 11.9 - 14.6 %    PLATELET 25 (LL) 085 - 450 K/uL    MPV 10.9 9.4 - 12.3 FL    ABSOLUTE NRBC 0.00 0.0 - 0.2 K/uL    NEUTROPHILS 85 (H) 43 - 78 %    LYMPHOCYTES 11 (L) 13 - 44 %    MONOCYTES 1 (L) 4.0 - 12.0 %    EOSINOPHILS 1 0.5 - 7.8 %    BASOPHILS 0 0.0 - 2.0 %    IMMATURE GRANULOCYTES 2 0.0 - 5.0 %    ABS. NEUTROPHILS 2.2 1.7 - 8.2 K/UL    ABS. LYMPHOCYTES 0.3 (L) 0.5 - 4.6 K/UL    ABS. MONOCYTES 0.0 (L) 0.1 - 1.3 K/UL    ABS. EOSINOPHILS 0.0 0.0 - 0.8 K/UL    ABS. BASOPHILS 0.0 0.0 - 0.2 K/UL    ABS. IMM. GRANS. 0.1 0.0 - 0.5 K/UL    RBC COMMENTS MODERATE  ANISOCYTOSIS + POIKILOCYTOSIS        WBC COMMENTS Result Confirmed By Smear      PLATELET COMMENTS MARKED      DF AUTOMATED     METABOLIC PANEL, COMPREHENSIVE    Collection Time: 04/13/22  7:20 PM   Result Value Ref Range    Sodium 143 136 - 145 mmol/L    Potassium 3.1 (L) 3.5 - 5.1 mmol/L    Chloride 117 (H) 98 - 107 mmol/L    CO2 13 (L) 21 - 32 mmol/L    Anion gap 13 7 - 16 mmol/L    Glucose 112 (H) 65 - 100 mg/dL    BUN 37 (H) 8 - 23 MG/DL    Creatinine 2.10 (H) 0.6 - 1.0 MG/DL    GFR est AA 30 (L) >60 ml/min/1.73m2    GFR est non-AA 25 (L) >60 ml/min/1.73m2    Calcium 8.7 8.3 - 10.4 MG/DL    Bilirubin, total 0.4 0.2 - 1.1 MG/DL    ALT (SGPT) 15 12 - 65 U/L    AST (SGOT) 17 15 - 37 U/L    Alk.  phosphatase 51 50 - 136 U/L    Protein, total 5.8 (L) 6.3 - 8.2 g/dL    Albumin 1.9 (L) 3.2 - 4.6 g/dL    Globulin 3.9 (H) 2.3 - 3.5 g/dL    A-G Ratio 0.5 (L) 1.2 - 3.5     PROCALCITONIN    Collection Time: 04/13/22  7:20 PM   Result Value Ref Range    Procalcitonin 0.30 0.00 - 0.49 ng/mL   MAGNESIUM    Collection Time: 04/13/22  7:20 PM   Result Value Ref Range    Magnesium 2.0 1.8 - 2.4 mg/dL   PHOSPHORUS    Collection Time: 04/13/22  7:20 PM   Result Value Ref Range    Phosphorus 4.2 (H) 2.3 - 3.7 MG/DL   TSH 3RD GENERATION    Collection Time: 04/13/22  7:20 PM   Result Value Ref Range    TSH 1.040 0.358 - 3.740 uIU/mL   URINALYSIS W/ RFLX MICROSCOPIC    Collection Time: 04/13/22  8:16 PM   Result Value Ref Range    Color YELLOW      Appearance CLOUDY      Specific gravity 1.025 (H) 1.001 - 1.023      pH (UA) 6.0 5.0 - 9.0      Protein 100 (A) NEG mg/dL    Glucose Negative mg/dL    Ketone Negative NEG mg/dL    Bilirubin Negative NEG      Blood MODERATE (A) NEG      Urobilinogen 0.2 0.2 - 1.0 EU/dL    Nitrites Negative NEG      Leukocyte Esterase SMALL AMOUNT (A) NEG     URINE MICROSCOPIC    Collection Time: 04/13/22  8:16 PM   Result Value Ref Range    WBC 20-50 0 /hpf    RBC 10-20 0 /hpf    Epithelial cells 0 0 /hpf    Bacteria TRACE 0 /hpf    Casts HYALINE 0 /lpf    Crystals, urine 0 0 /LPF    Mucus 0 0 /lpf   RBC, ALLOCATE    Collection Time: 04/13/22  8:45 PM   Result Value Ref Range    HISTORY CHECKED?  Historical check performed    LACTIC ACID    Collection Time: 04/13/22  9:14 PM   Result Value Ref Range    Lactic acid 1.7 0.4 - 2.0 MMOL/L   TYPE & SCREEN    Collection Time: 04/13/22  9:59 PM   Result Value Ref Range    Crossmatch Expiration 04/16/2022,2359     ABO/Rh(D) O NEGATIVE     Antibody screen NEG     Comment       [facility] CALLED ER POD B, SPOKE TO ROSE AT 2246 4.13.22 EOR    Unit number [id number]     Blood component type RC LR     Unit division 00     Status of unit ALLOCATED     Crossmatch result Compatible        EKG Results     Procedure 720 Value Units Date/Time    EKG, 12 LEAD, INITIAL [[id number]]     Order Status: Completed           All Micro Results     Procedure Component Value Units Date/Time    CULTURE, URINE [[id number]] Collected: 04/13/22 2030    Order Status: Completed Specimen: Urine from Clean catch Updated: 04/13/22 [address]  [[id number]] Collected: 04/13/22 1927    Order Status: Completed Specimen: Blood Updated: 04/13/22 2018    BLOOD CULTURE [[id number]] Collected: 04/13/22 1920    Order Status: Completed Specimen: Blood Updated: 04/13/22 2018          Other Studies:  CT HEAD WO CONT    Result Date: 4/13/2022  Noncontrast head CT Clinical Indication: Patient found down, unresponsive, severe weakness, tachycardic. Stage IV bladder cancer. Technique: Noncontrast axial images were obtained through the brain. All CT scans at this location are performed using dose modulation techniques as appropriate including the following: Automated exposure control, adjustment of the MA and/or kV according to patient's size, or use of iterative reconstruction technique. Reformatted sagittal, coronal images obtained to further evaluate bones, soft tissues, extra-axial spaces. Comparison: None available Findings: There is no acute intracranial hemorrhage, hydrocephalus, intra-axial mass, herniation, midline shift, or mass-effect. Scattered mild nonspecific white matter hypodensities, most often chronic small vessel ischemic change but indeterminate by CT without prior. There is no CT evidence of acute large artery territorial infarction or abnormal extra-axial fluid collection. The mastoid air cells and paranasal sinuses are clear where imaged. No displaced skull fractures are present. 1. No hemorrhage. 2. Nonspecific white matter hypodensities. .     XR CHEST PORT    Result Date: 4/13/2022  Chest portable CLINICAL INDICATION: Currently meets SIRS criteria, hypotensive, stage IV bladder cancer, patient found down with diminished responsiveness and severe weakness COMPARISON: Radiograph 3/1/2022, 12/28/2021 CT and 12/3/2021 radiograph TECHNIQUE: single AP portable view chest at 7:22 PM semiupright FINDINGS: Right portacatheter in place with tip projecting over right atrium. Low lung volume leading to crowding.  Patient rotated to the left. Stable mediastinal and hilar contours given technique and positioning. Lungs are unchanged without developing consolidation, pneumothorax, pleural effusion or CHF. No acute airspace disease.          Medications:  Medications Administered      Medications Administered     lactated ringers bolus infusion 1,000 mL     Admin Date  04/13/2022 Action  New Bag Dose  1,000 mL Rate  1,000 mL/hr Route  IntraVENous Administered By  Mara Hoffman RN                    Signed By: Vaishali Delatorre MD   [facility]    April 13, 2022   11:55 PM

## 2022-04-14 NOTE — PROGRESS NOTES
Hospitalist Progress Note   Admit Date:  2022  7:08 PM   Name:  Yeyo Leal   Age:  73 y.o. Sex:  female  :  1950   MRN:  [id number]   Room:  ER18/18    Presenting Complaint: Altered mental status    Reason(s) for Admission: AMS (altered mental status) [facility] Course & Interval History:     ER Arrival date and time:     2022  7:08 PM   Chief Complaint: \"Pt arrives via [address] from the [employer] on [address] [facility]. Per EMS pt's spouse called EMS after coming home to find pt in bed minimally responsive. EMS reports pt's spouse said pt was normal this AM around 0800. Upon EMS arrival pt was found to be responsive to pain only. . T 99 axillary. /80. Pt has stage 4 bladder cancer. \"    Reason for Admission:   Metabolic encephalopathy with AMS, multifactorial , in this 74 yo with advanced unresectable bladder cancer, undergoing chemo, now with anemia, thrombocytopenia, electrolyte abnormalities, poss UTI, SIRS criteria. CT Head NEG. No fever, but has tachycardia    Subjective/24hr Events (22): Patient examined at bedside. Patient with multiple blankets,  at bedside. Very lethargic but will open eyes to verbal command. No apparent distress but very lethargic and will not answer any questions. Difficulty to lock[place] any further history. ROS:  10 systems unable to be obtained due to clinical condition.      Assessment & Plan:     # Acute metabolic encephalopathy   - likely multifactorial  - hematologic workup as below  - follow cultures  - switch CTX to cefepime empirically  - MRI with new ischemic infarcts with hemorragic components  - avoid all NSAIDs and heparin products  - agree with neurology consult  - likely also an underlying degree of toxicity from chemotherapy   - avoid narcotics and benzos  - nonpharmacologic interventions    # SIRS  - not neutropenic but high risk for infections  - follow cultures  - switch CTX to cefepime as above  - IVF resuscitation     # Pancytopenia, due to chemotherapy  - received pRBC transfusion this morning  - transfuse for Hgb<7 and/or brisk bleeding  - transfuse for platelet count <61L with bleeding and/or <10K  - trend hematologic indices   - avoid NSAIDs and heparin products     # Metastatic bladder cancer, Stage IV  - per oncology      Acute kidney injury ([facility] (4/13/2022)   - likely multifactorial (volume depletion, poor po intake)  - IVF and blood product resuscitation   - avoid nephtoxic meds, IV contrast, NSAIDs, morphine  - strict I's/O's  - daily BMPs      Metabolic acidosis, normal anion gap (NAG) (4/14/2022)  - as above    F/E/N: fluids as above, replete electrolytes as needed, NPO until mental status improves    Ppx: SCDs for VTE    Code Status: FULL CODE ( wishes to think about code status)    Disposition: pending clinical improvement with plan as above. Very high risk for continued clinical decompensation. Discussed with  at bedside. All questions answered.      Hospital Problems as of 4/14/2022 Date Reviewed: 4/11/2022          Codes Class Noted - Resolved POA    Acute metabolic encephalopathy NKC-93-DU: G93.41  ICD-9-CM: 348.31  4/14/2022 - Present Unknown        Metabolic acidosis, normal anion gap (NAG) ICD-10-CM: E87.2  ICD-9-CM: 276.2  4/14/2022 - Present Unknown        Stroke ([facility] ICD-10-CM: I63.9  ICD-9-CM: 434.91  4/14/2022 - Present Unknown        Thrombocytopenia ([facility] ICD-10-CM: D69.6  ICD-9-CM: 287.5  4/13/2022 - Present Unknown        Pancytopenia ([facility] ICD-10-CM: C29.547  ICD-9-CM: 284.19  4/13/2022 - Present Unknown        Altered mental status ICD-10-CM: R41.82  ICD-9-CM: 780.97  4/13/2022 - Present Unknown        * (Principal) SIRS (systemic inflammatory response syndrome) ([facility] ICD-10-CM: R65.10  ICD-9-CM: 995.90  4/13/2022 - Present Unknown        AMS (altered mental status) ICD-10-CM: L18.77  ICD-9-CM: 780.97  4/13/2022 - Present Unknown        Acute kidney injury ([facility] ICD-10-CM: N17.9  ICD-9-CM: 584.9  4/13/2022 - Present Unknown        UTI (urinary tract infection) ICD-10-CM: N39.0  ICD-9-CM: 599.0  2/23/2022 - Present Unknown        Hematuria ICD-10-CM: R31.9  ICD-9-CM: 599.70  12/30/2021 - Present Yes        Bladder cancer ([facility] ICD-10-CM: C67.9  ICD-9-CM: 188.9  12/30/2021 - Present Yes              Objective:     Patient Vitals for the past 24 hrs:   Temp Pulse Resp BP SpO2   04/14/22 1430 -- -- -- 111/71 --   04/14/22 1140 98.7 °F (37.1 °C) 94 -- 129/60 100 %   04/14/22 0956 -- 94 22 106/71 100 %   04/14/22 0345 98.5 °F (36.9 °C) 70 17 125/69 100 %   04/14/22 0340 98.2 °F (36.8 °C) 78 18 124/63 100 %   04/14/22 0335 98.6 °F (37 °C) 86 17 126/78 --   04/14/22 0320 98.4 °F (36.9 °C) 84 20 117/81 --   04/14/22 0250 98.4 °F (36.9 °C) 81 20 123/68 100 %   04/14/22 0225 98.7 °F (37.1 °C) 68 17 114/67 100 %   04/14/22 0155 98.2 °F (36.8 °C) 76 13 118/66 --   04/14/22 0140 98.5 °F (36.9 °C) 92 14 125/70 --   04/14/22 0125 98.3 °F (36.8 °C) (!) 102 19 114/85 --   04/14/22 0110 98.1 °F (36.7 °C) 77 14 (!) 121/58 100 %   04/14/22 0105 98.3 °F (36.8 °C) 70 13 122/74 100 %   04/14/22 0100 98.5 °F (36.9 °C) 87 14 122/66 100 %   04/14/22 0055 98.2 °F (36.8 °C) 82 16 (!) 128/57 100 %   04/14/22 0046 98.9 °F (37.2 °C) 90 18 112/72 100 %   04/13/22 2016 -- (!) 108 18 -- 100 %   04/13/22 2015 -- (!) 104 13 117/78 97 %   04/13/22 1919 -- (!) 114 16 117/60 --   04/13/22 1912 98.7 °F (37.1 °C) (!) 130 17 111/75 97 %     Oxygen Therapy  O2 Sat (%): 100 % (04/14/22 1140)  Pulse via Oximetry: 108 beats per minute (04/13/22 2016)  O2 Device: None (Room air) (04/14/22 0730)    Estimated body mass index is 21.95 kg/m² as calculated from the following:    Height as of this encounter: 5' 2\" (1.575 m). Weight as of this encounter: 54.4 kg (120 lb).     Intake/Output Summary (Last 24 hours) at 4/14/2022 1537  Last data filed at 4/14/2022 1101  Gross per 24 hour   Intake 1378.8 ml   Output 150 ml   Net 1228.8 ml         Physical Exam:     Blood pressure 111/71, pulse 94, temperature 98.7 °F (37.1 °C), resp. rate 22, height 5' 2\" (1.575 m), weight 54.4 kg (120 lb), SpO2 100 %. General:    Lethargic and toxic appearing, cachectic appearing  Head:  Normocephalic, atraumatic  Eyes:  Sclerae appear normal.  Pupils equally round. ENT:  Nares appear normal, no drainage. Moist oral mucosa  Neck:  No restricted ROM. Trachea midline   CV:   RRR. No jugular venous distension. Lungs:   CTAB. No wheezing, rhonchi, or rales. Respirations even, unlabored  Abdomen: Bowel sounds present. Soft, nontender, nondistended. Extremities: No cyanosis or clubbing. No edema  Skin:     No rashes and normal coloration. Warm and dry. Neuro:  CN II-XII grossly intact. Sensation intact. A&Ox3  Psych:  Normal mood and affect. I have reviewed ordered lab tests and independently visualized imaging below:    Recent Labs:  Recent Results (from the past 48 hour(s))   LACTIC ACID    Collection Time: 04/13/22  7:20 PM   Result Value Ref Range    Lactic acid 2.1 (H) 0.4 - 2.0 MMOL/L   CBC WITH AUTOMATED DIFF    Collection Time: 04/13/22  7:20 PM   Result Value Ref Range    WBC 2.6 (L) 4.3 - 11.1 K/uL    RBC 2.32 (L) 4.05 - 5.2 M/uL    HGB 6.1 (LL) 11.7 - 15.4 g/dL    HCT 19.1 (L) 35.8 - 46.3 %    MCV 82.3 79.6 - 97.8 FL    MCH 26.3 26.1 - 32.9 PG    MCHC 31.9 31.4 - 35.0 g/dL    RDW 18.8 (H) 11.9 - 14.6 %    PLATELET 25 (LL) 341 - 450 K/uL    MPV 10.9 9.4 - 12.3 FL    ABSOLUTE NRBC 0.00 0.0 - 0.2 K/uL    NEUTROPHILS 85 (H) 43 - 78 %    LYMPHOCYTES 11 (L) 13 - 44 %    MONOCYTES 1 (L) 4.0 - 12.0 %    EOSINOPHILS 1 0.5 - 7.8 %    BASOPHILS 0 0.0 - 2.0 %    IMMATURE GRANULOCYTES 2 0.0 - 5.0 %    ABS. NEUTROPHILS 2.2 1.7 - 8.2 K/UL    ABS. LYMPHOCYTES 0.3 (L) 0.5 - 4.6 K/UL    ABS. MONOCYTES 0.0 (L) 0.1 - 1.3 K/UL    ABS. EOSINOPHILS 0.0 0.0 - 0.8 K/UL    ABS. BASOPHILS 0.0 0.0 - 0.2 K/UL    ABS. IMM.  GRANS. 0.1 0.0 - 0.5 K/UL    RBC COMMENTS MODERATE  ANISOCYTOSIS + POIKILOCYTOSIS        WBC COMMENTS Result Confirmed By Smear      PLATELET COMMENTS MARKED      DF AUTOMATED     METABOLIC PANEL, COMPREHENSIVE    Collection Time: 04/13/22  7:20 PM   Result Value Ref Range    Sodium 143 136 - 145 mmol/L    Potassium 3.1 (L) 3.5 - 5.1 mmol/L    Chloride 117 (H) 98 - 107 mmol/L    CO2 13 (L) 21 - 32 mmol/L    Anion gap 13 7 - 16 mmol/L    Glucose 112 (H) 65 - 100 mg/dL    BUN 37 (H) 8 - 23 MG/DL    Creatinine 2.10 (H) 0.6 - 1.0 MG/DL    GFR est AA 30 (L) >60 ml/min/1.73m2    GFR est non-AA 25 (L) >60 ml/min/1.73m2    Calcium 8.7 8.3 - 10.4 MG/DL    Bilirubin, total 0.4 0.2 - 1.1 MG/DL    ALT (SGPT) 15 12 - 65 U/L    AST (SGOT) 17 15 - 37 U/L    Alk.  phosphatase 51 50 - 136 U/L    Protein, total 5.8 (L) 6.3 - 8.2 g/dL    Albumin 1.9 (L) 3.2 - 4.6 g/dL    Globulin 3.9 (H) 2.3 - 3.5 g/dL    A-G Ratio 0.5 (L) 1.2 - 3.5     PROCALCITONIN    Collection Time: 04/13/22  7:20 PM   Result Value Ref Range    Procalcitonin 0.30 0.00 - 0.49 ng/mL   MAGNESIUM    Collection Time: 04/13/22  7:20 PM   Result Value Ref Range    Magnesium 2.0 1.8 - 2.4 mg/dL   PHOSPHORUS    Collection Time: 04/13/22  7:20 PM   Result Value Ref Range    Phosphorus 4.2 (H) 2.3 - 3.7 MG/DL   TSH 3RD GENERATION    Collection Time: 04/13/22  7:20 PM   Result Value Ref Range    TSH 1.040 0.358 - 3.740 uIU/mL   URINALYSIS W/ RFLX MICROSCOPIC    Collection Time: 04/13/22  8:16 PM   Result Value Ref Range    Color YELLOW      Appearance CLOUDY      Specific gravity 1.025 (H) 1.001 - 1.023      pH (UA) 6.0 5.0 - 9.0      Protein 100 (A) NEG mg/dL    Glucose Negative mg/dL    Ketone Negative NEG mg/dL    Bilirubin Negative NEG      Blood MODERATE (A) NEG      Urobilinogen 0.2 0.2 - 1.0 EU/dL    Nitrites Negative NEG      Leukocyte Esterase SMALL AMOUNT (A) NEG     URINE MICROSCOPIC    Collection Time: 04/13/22  8:16 PM   Result Value Ref Range    WBC 20-50 0 /hpf    RBC 10-20 0 /hpf    Epithelial cells 0 0 /hpf    Bacteria TRACE 0 /hpf    Casts HYALINE 0 /lpf    Crystals, urine 0 0 /LPF    Mucus 0 0 /lpf   CULTURE, URINE    Collection Time: 04/13/22  8:30 PM    Specimen: Clean catch; Urine   Result Value Ref Range    Special Requests: NO SPECIAL REQUESTS      Culture result:        NO GROWTH AFTER SHORT PERIOD OF INCUBATION. FURTHER RESULTS TO FOLLOW AFTER OVERNIGHT INCUBATION. RBC, ALLOCATE    Collection Time: 04/13/22  8:45 PM   Result Value Ref Range    HISTORY CHECKED? Historical check performed    LACTIC ACID    Collection Time: 04/13/22  9:14 PM   Result Value Ref Range    Lactic acid 1.7 0.4 - 2.0 MMOL/L   TYPE & SCREEN    Collection Time: 04/13/22  9:59 PM   Result Value Ref Range    Crossmatch Expiration 04/16/2022,2359     ABO/Rh(D) O NEGATIVE     Antibody screen NEG     Comment       [facility] CALLED ER POD B, SPOKE TO ROSE AT 5016 4.13.22 EOR    Unit number [id number]     Blood component type RC LR     Unit division 00     Status of unit ISSUED     Crossmatch result Compatible    EKG, 12 LEAD, INITIAL    Collection Time: 04/14/22 12:55 AM   Result Value Ref Range    Ventricular Rate 80 BPM    Atrial Rate 80 BPM    QRS Duration 108 ms    Q-T Interval 444 ms    QTC Calculation (Bezet) 513 ms    Calculated R Axis 12 degrees    Calculated T Axis 41 degrees    Diagnosis       NSR (small p waves). PACs. Low voltage, precordial leads  Prolonged QT interval    Confirmed by Dae Figueroa ([id number]) on 4/14/2022 11:26:00 AM         All Micro Results     Procedure Component Value Units Date/Time    CULTURE, URINE [[id number]] Collected: 04/13/22 2030    Order Status: Completed Specimen: Urine from Clean catch Updated: 04/14/22 0716     Special Requests: NO SPECIAL REQUESTS        Culture result:       NO GROWTH AFTER SHORT PERIOD OF INCUBATION. FURTHER RESULTS TO FOLLOW AFTER OVERNIGHT INCUBATION.           BLOOD CULTURE [[id number]] Collected: 04/13/22 1927    Order Status: Completed Specimen: Blood Updated: 04/13/22 2018    BLOOD CULTURE [[id number]] Collected: 04/13/22 1920    Order Status: Completed Specimen: Blood Updated: 04/13/22 2018          Other Studies:  MRI BRAIN W WO CONT    Result Date: 4/14/2022  EXAMINATION: BRAIN MRI 4/14/2022 1:24 PM ACCESSION NUMBER: [id number] INDICATION: AMS, bladder cancer COMPARISON: Head CT 4/13/2022 TECHNIQUE: Multiplanar multisequence MRI of the brain without and with intravenous administration of 11 cc ProHance contrast agent. FINDINGS: Technically difficult exam due to patient motion. Midline structures including the corpus callosum, pituitary gland, optic nerves, and cerebellum are well developed. There are widespread symmetric T2 hyperintensities throughout the supratentorial white matter, including extension along both external capsules and along the posterior limb of both internal capsules. There is a moderate-sized focus of restricted diffusion involving the cortex and underlying white matter of the right occipital lobe. This is an acute or early subacute infarction. There is trace associated petechial hemorrhage (gradient image 14). There is mild mass effect upon the adjacent occipital horn of the right lateral ventricle. The ventricles are otherwise appropriate in caliber for the otherwise symmetric degree of global brain parenchymal volume loss. There is no midline shift. The basilar cisterns are patent. There is no cerebellar tonsillar ectopia or herniation. No definite evidence of intracranial blood products within the limits of significant motion degradation the gradient images. There is no abnormal intra or extra-axial postcontrast enhancement. There are no suspicious osseous lesions. 1. Widespread symmetric T2 hyperintensities throughout the supratentorial white matter, consistent with a diffuse leukoencephalopathy. Differential considerations favor a toxic or metabolic etiology.  Correlation for side medication side effects or recent toxic ingestion is recommended. 2. Moderate size acute or early subacute infarction in the right occipital lobe. There is mild local mass effect and trace associated petechial hemorrhage. 3. No evidence of intracranial metastatic disease. DC4 The significant findings in this report have been referred to the Imaging Navigator in order to communicate to the referring provider or his/her designee as outlined in Section II.C.2.a.ii-iii of the ACR Practice Guideline for Communication of Diagnostic Imaging Findings. CT HEAD WO CONT    Result Date: 4/13/2022  Noncontrast head CT Clinical Indication: Patient found down, unresponsive, severe weakness, tachycardic. Stage IV bladder cancer. Technique: Noncontrast axial images were obtained through the brain. All CT scans at this location are performed using dose modulation techniques as appropriate including the following: Automated exposure control, adjustment of the MA and/or kV according to patient's size, or use of iterative reconstruction technique. Reformatted sagittal, coronal images obtained to further evaluate bones, soft tissues, extra-axial spaces. Comparison: None available Findings: There is no acute intracranial hemorrhage, hydrocephalus, intra-axial mass, herniation, midline shift, or mass-effect. Scattered mild nonspecific white matter hypodensities, most often chronic small vessel ischemic change but indeterminate by CT without prior. There is no CT evidence of acute large artery territorial infarction or abnormal extra-axial fluid collection. The mastoid air cells and paranasal sinuses are clear where imaged. No displaced skull fractures are present. 1. No hemorrhage. 2. Nonspecific white matter hypodensities. .     XR CHEST PORT    Result Date: 4/13/2022  Chest portable CLINICAL INDICATION: Currently meets SIRS criteria, hypotensive, stage IV bladder cancer, patient found down with diminished responsiveness and severe weakness COMPARISON: Radiograph 3/1/2022, 12/28/2021 CT and 12/3/2021 radiograph TECHNIQUE: single AP portable view chest at 7:22 PM semiupright FINDINGS: Right portacatheter in place with tip projecting over right atrium. Low lung volume leading to crowding. Patient rotated to the left. Stable mediastinal and hilar contours given technique and positioning. Lungs are unchanged without developing consolidation, pneumothorax, pleural effusion or CHF. No acute airspace disease. Current Meds:  Current Facility-Administered Medications   Medication Dose Route Frequency    sodium chloride (NS) flush 5-40 mL  5-40 mL IntraVENous Q8H    sodium chloride (NS) flush 5-40 mL  5-40 mL IntraVENous PRN    acetaminophen (TYLENOL) tablet 650 mg  650 mg Oral Q6H PRN    Or    acetaminophen (TYLENOL) suppository 650 mg  650 mg Rectal Q6H PRN    polyethylene glycol (MIRALAX) packet 17 g  17 g Oral DAILY PRN    ondansetron (ZOFRAN ODT) tablet 4 mg  4 mg Oral Q8H PRN    Or    ondansetron (ZOFRAN) injection 4 mg  4 mg IntraVENous Q6H PRN    dextrose 5% - 0.45% NaCl with KCl 20 mEq/L 1,000 mL with sodium bicarbonate (8.4%) 50 mEq infusion   IntraVENous CONTINUOUS    cefepime (MAXIPIME) 1 g in 0.9% sodium chloride (MBP/ADV) 50 mL MBP  1 g IntraVENous Q24H    saline peripheral flush soln 10 mL  10 mL InterCATHeter RAD ONCE    sodium chloride (NS) flush 5-10 mL  5-10 mL IntraVENous Q8H    sodium chloride (NS) flush 5-10 mL  5-10 mL IntraVENous PRN    0.9% sodium chloride infusion 250 mL  250 mL IntraVENous PRN     Current Outpatient Medications   Medication Sig    HYDROcodone-acetaminophen (NORCO) 5-325 mg per tablet Take 1 Tablet by mouth every six (6) hours as needed for Pain for up to 14 days. Max Daily Amount: 4 Tablets.  potassium chloride (K-DUR, KLOR-CON M20) 20 mEq tablet Take 1 Tablet by mouth two (2) times a day.  magnesium oxide (MAG-OX) 400 mg tablet Take 1 Tablet by mouth two (2) times a day.     ketoconazole (NIZORAL) 2 % topical cream APPLY TO THE AFFECTED AREA OF SKIN TWICE DAILY    lidocaine-prilocaine (EMLA) topical cream Apply  to affected area as needed for Pain. Apply to port 30 min prior to needle stick    ondansetron (ZOFRAN ODT) 8 mg disintegrating tablet Take 1 Tablet by mouth every eight (8) hours as needed for Nausea or Vomiting. Indications: prevent nausea and vomiting from cancer chemotherapy    prochlorperazine (Compazine) 10 mg tablet Take 1 Tablet by mouth every six (6) hours as needed for Nausea or Vomiting for up to 30 days. Indications: prevent nausea and vomiting from cancer chemotherapy    DULoxetine (CYMBALTA) 30 mg capsule  (Patient not taking: Reported on 3/25/2022)       Signed: Mirza Diana DO    Part of this note may have been written by using a voice dictation software. The note has been proof read but may still contain some grammatical/other typographical errors.

## 2022-04-14 NOTE — ACP (ADVANCE CARE PLANNING)
Advance care planninginitial encounter      Face to face time - 22 minutes face-to-face time spent discussion the care       Patient has decision making capacity   [] Yes  Ronak ] NO    Names of POA/surrogate decision maker when patient is altered:  zeny reynolds (Boyfriend)  [phone] (Mobile)    Other members present in the meeting : NONE. She has no family members, relatives in area        [XX] Full code- full aggressive medical and surgical interventions, including intubations, resuscitations, pressors, artificial tube feeding  [ ] DO NOT RESUSCITATE -okay to intubate,  and other aggressive medical and surgical intervention   [ ] Do Not resuscitate, DO NOT INTUBATE, but okay for other aggressive medical and surgical intervention   [ ] DNR/DNI, hospice, comfort focus care to maximize patient's quality of life  [ ] DNR/DNI, strict comfort care ONLY        Further discussion regarding principle disease process. prognosis, plans of care, and treatment goals:   Full code for now. No family members for surrogate decision making.  She has common law /significant other    Jean Marie Yoo MD

## 2022-04-14 NOTE — PROGRESS NOTES
Messaged with critical labs wbc 1.8 K, platelets 32Z, transfuse platelets <73H with procedures or bleeding, otherwise < 10K, CBC for tomorrow  Roma Cui MD

## 2022-04-14 NOTE — ED NOTES
Heart monitor showing multiple episodes of PVCs and missed beats. Pt asymptomatic during these episodes. Dr Mackenzie Ivan notified via perfect serve.

## 2022-04-14 NOTE — H&P
[facility]        Inpatient Hematology / Oncology History and Physical    Reason for Admission:  AMS (altered mental status) [R41.82]    History of Present Illness:  Ms. Hever Alvarado is a 73 y.o. female patient known to Dr. Hawa Nguyễn that is receiving treatment for locally advanced and unresectable bladder cancer. She is sp cycle one gemcitabine and carboplatin 4/5 and day 8 gemzar on 4/12. Her spouse states that she was normal when she went to bed last night then later became confused, somnolent, making very little sense in speaking. Per notes EMS arrived and patient only responsive to pain. CT head without was negative. Hgb 6.1, platelets 23R, WBC 2.6, ANC 2.2. K+3.1, Cr 2.10, LA 2.1 then recheck 1.7. CXR negative. UA small leukocyte esterase, trace bacteria, negative nitrites. UC and BCs obtained. She received dose of Rocephin now on cefe. Brain MRI pending. Today's labs pending. Review of Systems: Unable to obtain. Allergies   Allergen Reactions    Doxycycline Rash     Other reaction(s): Rash-Allergy    Fentanyl Rash     Other reaction(s): Rash-Allergy    Morphine Rash     Other reaction(s): Rash-Allergy     Past Medical History:   Diagnosis Date    Cancer ([facility] [address] 12/2021    bladder     Chronic pain     Back    Heart failure ([facility] [address]     Echo on 02/11/2022 showed Reduced left ventricular systolic function with a visually estimated EF of 45 - 50%. Normal diastolic function.     Sleep apnea     can not destinee c pap at hs     Past Surgical History:   Procedure Laterality Date    HX APPENDECTOMY      HX HERNIA REPAIR      HX HYSTERECTOMY      HX LAP CHOLECYSTECTOMY      HX ORTHOPAEDIC  1984    toe transplant--- toe removed from left foot and placed on left hand    IR INSERT TUNL CVC W PORT OVER 5 YEARS  3/8/2022    IR NEPHROSTOMY PERC RT PLC CATH  SI  2/24/2022    NEUROLOGICAL PROCEDURE UNLISTED      back surgery     Family History   Problem Relation Age of Onset    Cancer Mother colon    Cancer Father         lung    Cancer Brother         colon with mets to bladder    Cancer Paternal Uncle      Social History     Socioeconomic History    Marital status:      Spouse name: Not on file    Number of children: Not on file    Years of education: Not on file    Highest education level: Not on file   Occupational History    Not on file   Tobacco Use    Smoking status: Former Smoker     Packs/day: 1.00     Years: 50.00     Pack years: 50.00     Quit date:      Years since quittin.2    Smokeless tobacco: Never Used    Tobacco comment: began smoking age 17   Vaping Use    Vaping Use: Never used   Substance and Sexual Activity    Alcohol use: Never    Drug use: Never    Sexual activity: Not on file   Other Topics Concern    Not on file   Social History Narrative    Not on file     Social Determinants of Health     Financial Resource Strain:     Difficulty of Paying Living Expenses: Not on file   Food Insecurity:     Worried About [address] in the Last Year: Not on file    [address] in the Last Year: Not on file   Transportation Needs:     Lack of Transportation (Medical): Not on file    Lack of Transportation (Non-Medical):  Not on file   Physical Activity:     Days of Exercise per Week: Not on file    Minutes of Exercise per Session: Not on file   Stress:     Feeling of Stress : Not on file   Social Connections:     Frequency of Communication with Friends and Family: Not on file    Frequency of Social Gatherings with Friends and Family: Not on file    Attends Mandaen Services: Not on file    Active Member of Clubs or Organizations: Not on file    Attends Club or Organization Meetings: Not on file    Marital Status: Not on file   Intimate Partner Violence:     Fear of Current or Ex-Partner: Not on file    Emotionally Abused: Not on file    Physically Abused: Not on file    Sexually Abused: Not on file   Housing Stability:     Unable to Pay for Housing in the Last Year: Not on file    Number of Places Lived in the Last Year: Not on file    Unstable Housing in the Last Year: Not on file     Current Facility-Administered Medications   Medication Dose Route Frequency Provider Last Rate Last Admin    sodium chloride (NS) flush 5-40 mL  5-40 mL IntraVENous Q8H Sagar Li MD        sodium chloride (NS) flush 5-40 mL  5-40 mL IntraVENous PRN Sagar Li MD        acetaminophen (TYLENOL) tablet 650 mg  650 mg Oral Q6H PRN Sagar Li MD        Or    acetaminophen (TYLENOL) suppository 650 mg  650 mg Rectal Q6H PRN Sagar Li MD        polyethylene glycol (MIRALAX) packet 17 g  17 g Oral DAILY PRN Sagar Li MD        ondansetron (ZOFRAN ODT) tablet 4 mg  4 mg Oral Q8H PRN Sagar Li MD        Or    ondansetron [facility]) injection 4 mg  4 mg IntraVENous Q6H PRN Sagar Li MD        dextrose 5% - 0.45% NaCl with KCl 20 mEq/L 1,000 mL with sodium bicarbonate (8.4%) 50 mEq infusion   IntraVENous CONTINUOUS Sagar Li MD 75 mL/hr at 04/14/22 0419 New Bag at 04/14/22 0419    cefepime (MAXIPIME) 1 g in 0.9% sodium chloride (MBP/ADV) 50 mL MBP  1 g IntraVENous Q24H CiesVictorino trent  mL/hr at 04/14/22 1057 1 g at 04/14/22 1057    sodium chloride (NS) flush 5-10 mL  5-10 mL IntraVENous Q8H Christian SANDOVAL MD        sodium chloride (NS) flush 5-10 mL  5-10 mL IntraVENous PRN Ct Hiceky MD        0.9% sodium chloride infusion 250 mL  250 mL IntraVENous PRN Briana Degroot MD         Current Outpatient Medications   Medication Sig Dispense Refill    HYDROcodone-acetaminophen (NORCO) 5-325 mg per tablet Take 1 Tablet by mouth every six (6) hours as needed for Pain for up to 14 days. Max Daily Amount: 4 Tablets. 30 Tablet 0    potassium chloride (K-DUR, KLOR-CON M20) 20 mEq tablet Take 1 Tablet by mouth two (2) times a day.  60 Tablet 3    magnesium oxide (MAG-OX) 400 mg tablet Take 1 Tablet by mouth two (2) times a day. 60 Tablet 3    ketoconazole (NIZORAL) 2 % topical cream APPLY TO THE AFFECTED AREA OF SKIN TWICE DAILY      lidocaine-prilocaine (EMLA) topical cream Apply  to affected area as needed for Pain. Apply to port 30 min prior to needle stick 30 g 3    ondansetron (ZOFRAN ODT) 8 mg disintegrating tablet Take 1 Tablet by mouth every eight (8) hours as needed for Nausea or Vomiting. Indications: prevent nausea and vomiting from cancer chemotherapy 60 Tablet 3    prochlorperazine (Compazine) 10 mg tablet Take 1 Tablet by mouth every six (6) hours as needed for Nausea or Vomiting for up to 30 days. Indications: prevent nausea and vomiting from cancer chemotherapy 60 Tablet 3    DULoxetine (CYMBALTA) 30 mg capsule  (Patient not taking: Reported on 3/25/2022)         OBJECTIVE:  Patient Vitals for the past 8 hrs:   BP Temp Pulse Resp SpO2   22 0956 106/71  94 22 100 %   22 0345 125/69 98.5 °F (36.9 °C) 70 17 100 %   22 0340 124/63 98.2 °F (36.8 °C) 78 18 100 %   22 0335 126/78 98.6 °F (37 °C) 86 17    22 0320 117/81 98.4 °F (36.9 °C) 84 20      Temp (24hrs), Av.4 °F (36.9 °C), Min:98.1 °F (36.7 °C), Max:98.9 °F (37.2 °C)     0701 -  1900  In: -   Out: 150 [Urine:150]    Physical Exam:  Constitutional: Critically ill appearing  female in no acute distress, sitting comfortably in the hospital bed. HEENT: Normocephalic and atraumatic. Oropharynx is clear, mucous membranes are moist.      Skin Warm and dry. No bruising and no rash noted. No erythema. No pallor. Respiratory Lungs are clear to auscultation bilaterally without wheezes, rales or rhonchi, normal air exchange without accessory muscle use. CVS Irregular rate normal S1 and S2. No murmurs, gallops, or rubs. Abdomen Soft, nontender and nondistended, normoactive bowel sounds. No palpable mass. No hepatosplenomegaly. Neuro Mild left side facial droop. Left eye closed.  Moderate strength upper extremites   Psych Somnolent       Labs:    Recent Results (from the past 24 hour(s))   LACTIC ACID    Collection Time: 04/13/22  7:20 PM   Result Value Ref Range    Lactic acid 2.1 (H) 0.4 - 2.0 MMOL/L   CBC WITH AUTOMATED DIFF    Collection Time: 04/13/22  7:20 PM   Result Value Ref Range    WBC 2.6 (L) 4.3 - 11.1 K/uL    RBC 2.32 (L) 4.05 - 5.2 M/uL    HGB 6.1 (LL) 11.7 - 15.4 g/dL    HCT 19.1 (L) 35.8 - 46.3 %    MCV 82.3 79.6 - 97.8 FL    MCH 26.3 26.1 - 32.9 PG    MCHC 31.9 31.4 - 35.0 g/dL    RDW 18.8 (H) 11.9 - 14.6 %    PLATELET 25 (LL) 513 - 450 K/uL    MPV 10.9 9.4 - 12.3 FL    ABSOLUTE NRBC 0.00 0.0 - 0.2 K/uL    NEUTROPHILS 85 (H) 43 - 78 %    LYMPHOCYTES 11 (L) 13 - 44 %    MONOCYTES 1 (L) 4.0 - 12.0 %    EOSINOPHILS 1 0.5 - 7.8 %    BASOPHILS 0 0.0 - 2.0 %    IMMATURE GRANULOCYTES 2 0.0 - 5.0 %    ABS. NEUTROPHILS 2.2 1.7 - 8.2 K/UL    ABS. LYMPHOCYTES 0.3 (L) 0.5 - 4.6 K/UL    ABS. MONOCYTES 0.0 (L) 0.1 - 1.3 K/UL    ABS. EOSINOPHILS 0.0 0.0 - 0.8 K/UL    ABS. BASOPHILS 0.0 0.0 - 0.2 K/UL    ABS. IMM. GRANS. 0.1 0.0 - 0.5 K/UL    RBC COMMENTS MODERATE  ANISOCYTOSIS + POIKILOCYTOSIS        WBC COMMENTS Result Confirmed By Smear      PLATELET COMMENTS MARKED      DF AUTOMATED     METABOLIC PANEL, COMPREHENSIVE    Collection Time: 04/13/22  7:20 PM   Result Value Ref Range    Sodium 143 136 - 145 mmol/L    Potassium 3.1 (L) 3.5 - 5.1 mmol/L    Chloride 117 (H) 98 - 107 mmol/L    CO2 13 (L) 21 - 32 mmol/L    Anion gap 13 7 - 16 mmol/L    Glucose 112 (H) 65 - 100 mg/dL    BUN 37 (H) 8 - 23 MG/DL    Creatinine 2.10 (H) 0.6 - 1.0 MG/DL    GFR est AA 30 (L) >60 ml/min/1.73m2    GFR est non-AA 25 (L) >60 ml/min/1.73m2    Calcium 8.7 8.3 - 10.4 MG/DL    Bilirubin, total 0.4 0.2 - 1.1 MG/DL    ALT (SGPT) 15 12 - 65 U/L    AST (SGOT) 17 15 - 37 U/L    Alk.  phosphatase 51 50 - 136 U/L    Protein, total 5.8 (L) 6.3 - 8.2 g/dL    Albumin 1.9 (L) 3.2 - 4.6 g/dL    Globulin 3.9 (H) 2.3 - 3.5 g/dL    A-G Ratio 0.5 (L) 1.2 - 3.5     PROCALCITONIN    Collection Time: 04/13/22  7:20 PM   Result Value Ref Range    Procalcitonin 0.30 0.00 - 0.49 ng/mL   MAGNESIUM    Collection Time: 04/13/22  7:20 PM   Result Value Ref Range    Magnesium 2.0 1.8 - 2.4 mg/dL   PHOSPHORUS    Collection Time: 04/13/22  7:20 PM   Result Value Ref Range    Phosphorus 4.2 (H) 2.3 - 3.7 MG/DL   TSH 3RD GENERATION    Collection Time: 04/13/22  7:20 PM   Result Value Ref Range    TSH 1.040 0.358 - 3.740 uIU/mL   URINALYSIS W/ RFLX MICROSCOPIC    Collection Time: 04/13/22  8:16 PM   Result Value Ref Range    Color YELLOW      Appearance CLOUDY      Specific gravity 1.025 (H) 1.001 - 1.023      pH (UA) 6.0 5.0 - 9.0      Protein 100 (A) NEG mg/dL    Glucose Negative mg/dL    Ketone Negative NEG mg/dL    Bilirubin Negative NEG      Blood MODERATE (A) NEG      Urobilinogen 0.2 0.2 - 1.0 EU/dL    Nitrites Negative NEG      Leukocyte Esterase SMALL AMOUNT (A) NEG     URINE MICROSCOPIC    Collection Time: 04/13/22  8:16 PM   Result Value Ref Range    WBC 20-50 0 /hpf    RBC 10-20 0 /hpf    Epithelial cells 0 0 /hpf    Bacteria TRACE 0 /hpf    Casts HYALINE 0 /lpf    Crystals, urine 0 0 /LPF    Mucus 0 0 /lpf   CULTURE, URINE    Collection Time: 04/13/22  8:30 PM    Specimen: Clean catch; Urine   Result Value Ref Range    Special Requests: NO SPECIAL REQUESTS      Culture result:        NO GROWTH AFTER SHORT PERIOD OF INCUBATION. FURTHER RESULTS TO FOLLOW AFTER OVERNIGHT INCUBATION. RBC, ALLOCATE    Collection Time: 04/13/22  8:45 PM   Result Value Ref Range    HISTORY CHECKED?  Historical check performed    LACTIC ACID    Collection Time: 04/13/22  9:14 PM   Result Value Ref Range    Lactic acid 1.7 0.4 - 2.0 MMOL/L   TYPE & SCREEN    Collection Time: 04/13/22  9:59 PM   Result Value Ref Range    Crossmatch Expiration 04/16/2022,2359     ABO/Rh(D) O NEGATIVE     Antibody screen NEG     Comment       [facility] CALLED ER POD B, SPOKE TO ROSE AT 2246 4.13.22 EOR    Unit number [id number]     Blood component type RC LR     Unit division 00     Status of unit ISSUED     Crossmatch result Compatible    EKG, 12 LEAD, INITIAL    Collection Time: 04/14/22 12:55 AM   Result Value Ref Range    Ventricular Rate 80 BPM    Atrial Rate 80 BPM    QRS Duration 108 ms    Q-T Interval 444 ms    QTC Calculation (Bezet) 513 ms    Calculated R Axis 12 degrees    Calculated T Axis 41 degrees    Diagnosis       Atrial fibrillation  Low voltage, precordial leads  Prolonged QT interval         Imaging:  [unfilled]    ASSESSMENT:  Problem List  Date Reviewed: 4/11/2022          Codes Class Noted    Metabolic encephalopathy [id number]: G93.41  ICD-9-CM: 348.31  2/04/8078        Metabolic acidosis, normal anion gap (NAG) ICD-10-CM: E87.2  ICD-9-CM: 276.2  4/14/2022        Thrombocytopenia ([facility] ICD-10-CM: D69.6  ICD-9-CM: 287.5  4/13/2022        Anemia ICD-10-CM: D64.9  ICD-9-CM: 285.9  4/13/2022        Altered mental status ICD-10-CM: R41.82  ICD-9-CM: 780.97  4/13/2022        * (Principal) SIRS (systemic inflammatory response syndrome) ([facility] ICD-10-CM: R65.10  ICD-9-CM: 995.90  4/13/2022        AMS (altered mental status) ICD-10-CM: R41.82  ICD-9-CM: 780.97  4/13/2022        Acute kidney injury ([facility] ICD-10-CM: N17.9  ICD-9-CM: 584.9  4/13/2022        Elevated serum creatinine ICD-10-CM: R79.89  ICD-9-CM: 790.99  4/4/2022        Acute renal failure (ARF) (HCC) ICD-10-CM: N17.9  ICD-9-CM: 584.9  2/23/2022        UTI (urinary tract infection) ICD-10-CM: N39.0  ICD-9-CM: 599.0  2/23/2022        Kidney congenitally absent, left ICD-10-CM: Q60.0  ICD-9-CM: 753.0  2/23/2022        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 ([facility] [address] ICD-10-CM: N13.30, C67.9  ICD-9-CM: 591, 188.9  2/23/2022        Tobacco use ICD-10-CM: Z72.0  ICD-9-CM: 305.1  2/9/3215        Systolic CHF, chronic (HCC) ICD-10-CM: I50.22  ICD-9-CM: 428.22, 428.0  2/2/2022    Overview Signed 2/2/2022  8:06 AM by Juan Silvestre, Tawnya Dick MD     Echo 2019 [place] 45% with no severe valvular pathology             THAO (obstructive sleep apnea) ICD-10-CM: G47.33  ICD-9-CM: 327.23  2/2/2022        Hematuria ICD-10-CM: R31.9  ICD-9-CM: 599.70  12/30/2021        Bladder cancer ([facility] [address] ICD-10-CM: C67.9  ICD-9-CM: 188.9  12/30/2021        Carpal tunnel syndrome of right wrist ICD-10-CM: G56.01  ICD-9-CM: 354.0  9/26/2019    Overview Signed 3/17/2022  3:55 PM by Richa Jimenez MD     Last Assessment & Plan:   Formatting of this note might be different from the original.  Continue naprosyn and wrist split started today. Pt will try this for 2-3 weeks. If not resolving we will refer to the hand center. Primary insomnia ICD-10-CM: F51.01  ICD-9-CM: 307.42  11/20/2017    Overview Signed 3/17/2022  3:55 PM by Richa Jimenez MD     Last Assessment & Plan:   Formatting of this note might be different from the original.  Chronic induction and maintenance insomnia. Chronic sleep deprivation and poor sleep hygiene. 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 Patient is undergoing reevaluation for previously diagnosed THAO and CPAP therapy will potentially amplify her already severe insomnia. Plan: Ambien 5 mg daily at bedtime, emphasized sleep hygiene. Left ventricular diastolic dysfunction with preserved systolic function [id number]: I51.89  ICD-9-CM: 429.9  12/19/2016    Overview Signed 3/17/2022  3:55 PM by Richa Jimenez MD     Last Assessment & Plan:   Formatting of this note might be different from the original.  Grade 1 diastolic dysfunction on last ECHO. Patient has f/u with pcp in 2 weeks. Encouraged to review symptoms of shortness of breath and orthopnea with pcp to see if further evaluation is indicated.              Irregular heart beat ICD-10-CM: I49.9  ICD-9-CM: 427.9  2/29/2016    Overview Signed 3/17/2022  3:55 PM by Richa Jimenez MD     Last Assessment & Plan:   Formatting of this note might be different from the original.  ECG showed a regular rate and rhythm. I discussed the patient with Dr. Guicho Holbrook, and he agreed that no further intervention is indicated at this time. Patient is advised to follow up if symptoms develop or if occuring more frequently, I will set her up for a holter monitor at that point. Otherwise, we will continue her current regimen as prescribed. Hyperlipidemia ICD-10-CM: E78.5  ICD-9-CM: 272.4  2/12/2016    Overview Signed 3/17/2022  3:55 PM by Cait Darby MD     Last Assessment & Plan:   Formatting of this note might be different from the original.  Well controlled with Simvastatin 20 mg. Continue current medications and work on diet with exercise. PLAN:  Bladder cancer sp cycle one carbo/gem 4/5 and 4/12    Encephalopathy/mild left facial droop/left eye closed  -CT head without contrast negative  -MRI brain without contrast pending  -Neurology consulted    Pancytopenia related to chemo  -Hgb 6.1-transfuse one unit    RA  -Cr 2.10 on D51/2 NS at 75    Electrolyte abnormalities  -Kirby SOPs    Concern for UTI  -received one time dose Rocephin now on cefe  -follow culture    Kirby SOPs    Lab studies and imaging studies were personally reviewed. Pertinent old records were reviewed    Thank you for allowing us to participate in the care of Ms. Agata Calix. We will gladly take over the care of our patient.                Oksana Conway NP   [facility]  [address] [address]  Office : [phone]  Fax : [phone]

## 2022-04-15 PROBLEM — G93.49 LEUKOENCEPHALOPATHY: Status: ACTIVE | Noted: 2022-01-01

## 2022-04-15 PROBLEM — R62.7 ADULT FAILURE TO THRIVE: Status: ACTIVE | Noted: 2022-01-01

## 2022-04-15 PROBLEM — E87.0 HYPERNATREMIA: Status: ACTIVE | Noted: 2022-01-01

## 2022-04-15 NOTE — PROGRESS NOTES
END OF SHIFT NOTE:    Intake/Output  No intake/output data recorded. Voiding: YES  Catheter: NO  Drain:  Nephrology tube            Stool:  1 occurrences. Stool Assessment  Stool Color: Brown (04/15/22 0636)  Stool Appearance: Watery (04/15/22 0636)  Stool Amount: Medium (04/15/22 0636)  Stool Source/Status: Incontinence; Rectum (04/15/22 0636)    Emesis:  0 occurrences. VITAL SIGNS  Patient Vitals for the past 12 hrs:   Temp Pulse Resp BP SpO2   04/15/22 0349 98.4 °F (36.9 °C) (!) 110 20 124/73 100 %   04/14/22 2208 98.7 °F (37.1 °C) 89 22 (!) 117/51 100 %       Pain Assessment  Pain 1  Pain Scale 1: Numeric (0 - 10) (04/14/22 2332)  Pain Intensity 1: 0 (04/14/22 2332)  Patient Stated Pain Goal: 0 (04/14/22 2332)    Ambulating  No    Additional Information:   - Pt came up from ED having had a loose BM. Gluteal fold red, irritated, peeling and red in perivaginal areas. Allevyn placed. - Incision on abdomen red with pus around it.  - Nephrology tube site red with drainage around it upon arrival, cleansed gently with NS flush. Placed new dressing.   - Wound care consult put it. - Accessed port using sterile technique, blood return, currently infusing.   - Platelet level at 11, contacted provider via perfect serve, order for platelets put in.   -Pt nonverbal,  at bedside.   - Replaced potassium. Shift report given to oncoming nurse at the bedside.     Amisha Suarez RN

## 2022-04-15 NOTE — PROGRESS NOTES
[facility] Hematology & Oncology        Inpatient Hematology / Oncology Progress Note      Admission Date: 2022  7:08 PM  Reason for Admission/Hospital Course: AMS (altered mental status) [R41.82]      24 Hour Events:  Afeb, VSS   at bedside. Very lethargic and does not speak during exam    ROS:  Unable to assess as pt not very responsive. She does not speak during rounding. 10 point review of systems is otherwise negative with the exception of the elements mentioned above in the HPI. Allergies   Allergen Reactions    Doxycycline Rash     Other reaction(s): Rash-Allergy    Fentanyl Rash     Other reaction(s): Rash-Allergy    Morphine Rash     Other reaction(s): Rash-Allergy       OBJECTIVE:  Patient Vitals for the past 8 hrs:   BP Temp Pulse Resp SpO2   04/15/22 0800 118/78 98.3 °F (36.8 °C) 72 18 --   04/15/22 0349 124/73 98.4 °F (36.9 °C) (!) 110 20 100 %     Temp (24hrs), Av.5 °F (36.9 °C), Min:98.3 °F (36.8 °C), Max:98.7 °F (37.1 °C)    No intake/output data recorded. Physical Exam:  Constitutional: Ill apeearing female in no acute distress, sitting comfortably in the hospital bed. HEENT: Normocephalic and atraumatic. No oral exam performed. supple without JVD. No thyromegaly present. Lymph node   Deferred    Skin Warm and dry. +nephrostomy tube with dressing. Respiratory Lungs are clear to auscultation bilaterally without wheezes, rales or rhonchi, normal air exchange without accessory muscle use. CVS Normal rate, regular rhythm and normal S1 and S2. No murmurs, gallops, or rubs. Abdomen Soft, nontender and nondistended, normoactive bowel sounds. No palpable mass. No hepatosplenomegaly. +R nephrostomy tube    Neuro Unable to fully assess as pt is not responsive. She does not speak at all during exam.    MSK Normal range of motion in general.  No edema and no tenderness. Psych Appropriate mood and affect.         Labs:      Recent Labs     04/15/22  5426 04/14/22 1642 04/13/22 1920   WBC 1.0* 1.8* 2.6*   RBC 3.12* 2.91* 2.32*   HGB 8.0* 7.7* 6.1*   HCT 25.4* 23.8* 19.1*   MCV 81.4 81.8 82.3   MCH 25.6* 26.5 26.3   MCHC 31.5 32.4 31.9   RDW 18.6* 18.2* 18.8*   PLT 11* 16* 25*   GRANS 67 84* 85*   LYMPH 27 13 11*   MONOS 0* 0* 1*   EOS 1 1 1   BASOS 2 0 0   IG 3 2 2   DF AUTOMATED AUTOMATED AUTOMATED   ANEU 0.7* 1.6* 2.2   ABL 0.3* 0.2* 0.3*   ABM 0.0* 0.0* 0.0*   DOMENICA 0.0 0.0 0.0   ABB 0.0 0.0 0.0   AIG 0.0 0.0 0.1        Recent Labs     04/15/22  0521 04/14/22  1642 04/13/22  1920   * 147* 143   K 3.4* 2.8* 3.1*   * 121* 117*   CO2 12* 13* 13*   AGAP 14 13 13   GLU 90 87 112*   BUN 42* 38* 37*   CREA 1.70* 1.70* 2.10*   GFRAA 38* 38* 30*   GFRNA 32* 32* 25*   CA 9.2 8.9 8.7   AP 53 49* 51   TP 6.2* 6.0* 5.8*   ALB 2.0* 1.9* 1.9*   GLOB 4.2* 4.1* 3.9*   AGRAT 0.5* 0.5* 0.5*   MG  --   --  2.0   PHOS  --   --  4.2*         Imaging:    Medications:  Current Facility-Administered Medications   Medication Dose Route Frequency    0.9% sodium chloride infusion 250 mL  250 mL IntraVENous PRN    methylPREDNISolone (PF) (Solu-MEDROL) 500 mg in 0.9% sodium chloride 100 mL IVPB  500 mg IntraVENous BID    pantoprazole (PROTONIX) 40 mg in 0.9% sodium chloride 10 mL injection  40 mg IntraVENous DAILY    sodium chloride (NS) flush 5-40 mL  5-40 mL IntraVENous Q8H    sodium chloride (NS) flush 5-40 mL  5-40 mL IntraVENous PRN    acetaminophen (TYLENOL) tablet 650 mg  650 mg Oral Q6H PRN    Or    acetaminophen (TYLENOL) suppository 650 mg  650 mg Rectal Q6H PRN    polyethylene glycol (MIRALAX) packet 17 g  17 g Oral DAILY PRN    ondansetron (ZOFRAN ODT) tablet 4 mg  4 mg Oral Q8H PRN    Or    ondansetron (ZOFRAN) injection 4 mg  4 mg IntraVENous Q6H PRN    dextrose 5% - 0.45% NaCl with KCl 20 mEq/L 1,000 mL with sodium bicarbonate (8.4%) 50 mEq infusion   IntraVENous CONTINUOUS    cefepime (MAXIPIME) 1 g in 0.9% sodium chloride (MBP/ADV) 50 mL MBP  1 g IntraVENous Q24H    sodium chloride (NS) flush 5-10 mL  5-10 mL IntraVENous Q8H    sodium chloride (NS) flush 5-10 mL  5-10 mL IntraVENous PRN    0.9% sodium chloride infusion 250 mL  250 mL IntraVENous PRN         ASSESSMENT:    Problem List  Date Reviewed: 4/11/2022          Codes Class Noted    Acute metabolic encephalopathy KDL-30-KD: G93.41  ICD-9-CM: 348.31  6/65/6508        Metabolic acidosis, normal anion gap (NAG) ICD-10-CM: E87.2  ICD-9-CM: 276.2  4/14/2022        Stroke ([facility] ICD-10-CM: I63.9  ICD-9-CM: 434.91  4/14/2022        Thrombocytopenia ([facility] ICD-10-CM: D69.6  ICD-9-CM: 287.5  4/13/2022        Pancytopenia ([facility] ICD-10-CM: R20.828  ICD-9-CM: 284.19  4/13/2022        Altered mental status ICD-10-CM: R41.82  ICD-9-CM: 780.97  4/13/2022        * (Principal) SIRS (systemic inflammatory response syndrome) ([facility] ICD-10-CM: R65.10  ICD-9-CM: 995.90  4/13/2022        AMS (altered mental status) ICD-10-CM: R41.82  ICD-9-CM: 780.97  4/13/2022        Acute kidney injury ([facility] ICD-10-CM: N17.9  ICD-9-CM: 584.9  4/13/2022        Elevated serum creatinine ICD-10-CM: R79.89  ICD-9-CM: 790.99  4/4/2022        Acute renal failure (ARF) ([facility] ICD-10-CM: N17.9  ICD-9-CM: 584.9  2/23/2022        UTI (urinary tract infection) ICD-10-CM: N39.0  ICD-9-CM: 599.0  2/23/2022        Kidney congenitally absent, 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-CM: Q60.0  ICD-9-CM: 753.0  2/23/2022        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 [facility]) ICD-10-CM: N13.30, C67.9  ICD-9-CM: 591, 188.9  2/23/2022        Tobacco use ICD-10-CM: Z72.0  ICD-9-CM: 305.1  1/6/4617        Systolic CHF, chronic (HCC) ICD-10-CM: I50.22  ICD-9-CM: 428.22, 428.0  2/2/2022    Overview Signed 2/2/2022  8:06 AM by Clara Bolden MD     Echo 2019 [place] 45% with no severe valvular pathology             THAO (obstructive sleep apnea) ICD-10-CM: G47.33  ICD-9-CM: 327.23  2/2/2022        Hematuria ICD-10-CM: R31.9  ICD-9-CM: 599.70  12/30/2021        Bladder cancer [facility]) ICD-10-CM: C67.9  ICD-9-CM: 188.9  12/30/2021        Carpal tunnel syndrome of right wrist ICD-10-CM: G56.01  ICD-9-CM: 354.0  9/26/2019    Overview Signed 3/17/2022  3:55 PM by Ramirez Orr MD     Last Assessment & Plan:   Formatting of this note might be different from the original.  Continue naprosyn and wrist split started today. Pt will try this for 2-3 weeks. If not resolving we will refer to the hand center. Primary insomnia ICD-10-CM: F51.01  ICD-9-CM: 307.42  11/20/2017    Overview Signed 3/17/2022  3:55 PM by Ramirez Orr MD     Last Assessment & Plan:   Formatting of this note might be different from the original.  Chronic induction and maintenance insomnia. Chronic sleep deprivation and poor sleep hygiene. 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 Patient is undergoing reevaluation for previously diagnosed THAO and CPAP therapy will potentially amplify her already severe insomnia. Plan: Ambien 5 mg daily at bedtime, emphasized sleep hygiene. Left ventricular diastolic dysfunction with preserved systolic function BDL-04-RE: I51.89  ICD-9-CM: 429.9  12/19/2016    Overview Signed 3/17/2022  3:55 PM by Ramirez Orr MD     Last Assessment & Plan:   Formatting of this note might be different from the original.  Grade 1 diastolic dysfunction on last ECHO. Patient has f/u with pcp in 2 weeks. Encouraged to review symptoms of shortness of breath and orthopnea with pcp to see if further evaluation is indicated. Irregular heart beat ICD-10-CM: I49.9  ICD-9-CM: 427.9  2/29/2016    Overview Signed 3/17/2022  3:55 PM by Ramirez Orr MD     Last Assessment & Plan:   Formatting of this note might be different from the original.  ECG showed a regular rate and rhythm. I discussed the patient with Dr. Edward Ceron, and he agreed that no further intervention is indicated at this time.   Patient is advised to follow up if symptoms develop or if occuring more frequently, I will set her up for a holter monitor at that point. Otherwise, we will continue her current regimen as prescribed. Hyperlipidemia ICD-10-CM: E78.5  ICD-9-CM: 272.4  2/12/2016    Overview Signed 3/17/2022  3:55 PM by Deo Liu MD     Last Assessment & Plan:   Formatting of this note might be different from the original.  Well controlled with Simvastatin 20 mg. Continue current medications and work on diet with exercise. PLAN:  Bladder cancer sp cycle one carbo/gem 4/5 and 4/12     Encephalopathy/mild left facial droop/left eye closed  -CT head without contrast negative  -MRI brain without contrast pending  -Neurology consulted  4/16 Brain MRI with concern for diffuse leukoencephalopathy as well as noted moderate size acute or early subacute infarction in the right occipital lobe with mild local mass effect and trace petechial hemorrhage. Neurology discussed with Dr. Gene Elam and they plan to start steroids. Continue to monitor AMS. Neurology consult pending.      Pancytopenia related to chemo  -Hgb 6.1-transfuse one unit     RA  -Cr 2.10 on D51/2 NS at 75  4/15 improving to 1.70.      Electrolyte abnormalities  -[place] SOPs     Concern for UTI  -received one time dose Rocephin now on cefe  -follow culture   4/15 Urine culture with mixed skin froylan. Wounds  -nephrostomy tube noted to have drainage around it on arrival, an abdominal incision with pus, and some peeling areas in the gluteal fold. Wound care has been consulted for further recommendations. Disposition: Conversations will continue to occur about goals of care depending on clinical course. Dr. Gene Elam discussed with patient's  today that we will need to make further decisions about nutritional support over the weekend. Goals and plan of care reviewed with the patient. All questions answered to the best of our ability.             Jennifer Ada Rao [facility] Hematology and Oncology/Radiation Oncology   [address] [address]  Office : [phone]  Fax : [phone]

## 2022-04-15 NOTE — PROGRESS NOTES
Hospitalist Progress Note   Admit Date:  2022  7:08 PM   Name:  Molly Humphreys   Age:  73 y.o. Sex:  female  :  1950   MRN:  [id number]   Room:  [facility]    Presenting Complaint: Altered mental status    Reason(s) for Admission: AMS (altered mental status) [facility] Course & Interval History:     ER Arrival date and time:     2022  7:08 PM   Chief Complaint: \"Pt arrives via [address] from the [employer] on [address] [facility]. Per EMS pt's spouse called EMS after coming home to find pt in bed minimally responsive. EMS reports pt's spouse said pt was normal this AM around 0800. Upon EMS arrival pt was found to be responsive to pain only. . T 99 axillary. /80. Pt has stage 4 bladder cancer. \"    Reason for Admission:   Metabolic encephalopathy with AMS, multifactorial , in this 74 yo with advanced unresectable bladder cancer, undergoing chemo, now with anemia, thrombocytopenia, electrolyte abnormalities, poss UTI, SIRS criteria. CT Head NEG. No fever, but has tachycardia    Subjective/24hr Events (04/15/22): Patient examined at bedside. Still lethargic but will smile occasionally. Cannot lockhart any further information due to clinical condition. ROS:  10 systems unable to be obtained due to clinical condition.      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   - MRI with new ischemic infarcts with hemorragic components  - avoid all NSAIDs and heparin products  - avoid narcotics and benzos  - nonpharmacologic interventions  - worsening hypernatremia, will increase fluid rate of infusion    # SIRS  - not neutropenic but high risk for infections  - follow cultures  - switch CTX to cefepime as above (day 2)  - IVF resuscitation     # Pancytopenia, due to chemotherapy  - received pRBC transfusion upon admission  - transfuse for Hgb<7 and/or brisk bleeding  - transfuse for platelet count <88P with bleeding and/or <10K  - trend hematologic indices   - oncology following and will assume care as primary  - avoid NSAIDs and heparin products     # Metastatic bladder cancer, Stage IV  - per oncology      Acute kidney [facility] ([facility] (4/13/2022)   - likely multifactorial (volume depletion, poor po intake)  - IVF and blood product resuscitation   - avoid nephtoxic meds, IV contrast, NSAIDs, morphine  - strict I's/O's  - daily BMPs      Metabolic acidosis, normal anion gap (NAG) (4/14/2022)  - as above    F/E/N: fluids as above, replete electrolytes as needed, NPO until mental status improves    Ppx: SCDs for VTE    Code Status: FULL CODE ( wishes to think about code status)    Disposition: pending clinical improvement with plan as above. Very high risk for continued clinical decompensation. Hospitalist will sign off. Please call/page with any questions. Discussed with  at bedside. All questions answered.      Hospital Problems as of 4/15/2022 Date Reviewed: 4/11/2022          Codes Class Noted - Resolved POA    Acute metabolic encephalopathy IIY-25-BF: G93.41  ICD-9-CM: 348.31  4/14/2022 - Present Unknown        Metabolic acidosis, normal anion gap (NAG) ICD-10-CM: E87.2  ICD-9-CM: 276.2  4/14/2022 - Present Unknown        Stroke ([facility] ICD-10-CM: I63.9  ICD-9-CM: 434.91  4/14/2022 - Present Unknown        Thrombocytopenia ([facility] ICD-10-CM: D69.6  ICD-9-CM: 287.5  4/13/2022 - Present Unknown        Pancytopenia ([facility] ICD-10-CM: V29.573  ICD-9-CM: 284.19  4/13/2022 - Present Unknown        Altered mental status ICD-10-CM: R41.82  ICD-9-CM: 780.97  4/13/2022 - Present Unknown        * (Principal) SIRS (systemic inflammatory response syndrome) ([facility] ICD-10-CM: R65.10  ICD-9-CM: 995.90  4/13/2022 - Present Unknown        AMS (altered mental status) ICD-10-CM: R41.82  ICD-9-CM: 780.97  4/13/2022 - Present Unknown        Acute kidney injury [facility]) ICD-10-CM: N17.9  ICD-9-CM: 584.9  4/13/2022 - Present Unknown        UTI (urinary tract infection) ICD-10-CM: N39.0  ICD-9-CM: 599.0  2/23/2022 - Present Unknown        Hematuria ICD-10-CM: R31.9  ICD-9-CM: 599.70  12/30/2021 - Present Yes        Bladder cancer ([facility] ICD-10-CM: C67.9  ICD-9-CM: 188.9  12/30/2021 - Present Yes              Objective:     Patient Vitals for the past 24 hrs:   Temp Pulse Resp BP SpO2   04/15/22 1120 97.8 °F (36.6 °C) (!) 111 18 131/61 100 %   04/15/22 0800 98.3 °F (36.8 °C) 72 18 118/78 --   04/15/22 0349 98.4 °F (36.9 °C) (!) 110 20 124/73 100 %   04/14/22 2208 98.7 °F (37.1 °C) 89 22 (!) 117/51 100 %   04/14/22 1430 -- -- -- 111/71 --     Oxygen Therapy  O2 Sat (%): 100 % (04/15/22 1120)  Pulse via Oximetry: 108 beats per minute (04/13/22 2016)  O2 Device: None (Room air) (04/14/22 0730)    Estimated body mass index is 21.95 kg/m² as calculated from the following:    Height as of this encounter: 5' 2\" (1.575 m). Weight as of this encounter: 54.4 kg (120 lb). Intake/Output Summary (Last 24 hours) at 4/15/2022 1238  Last data filed at 4/15/2022 0636  Gross per 24 hour   Intake --   Output 150 ml   Net -150 ml         Physical Exam:     Blood pressure 131/61, pulse (!) 111, temperature 97.8 °F (36.6 °C), resp. rate 18, height 5' 2\" (1.575 m), weight 54.4 kg (120 lb), SpO2 100 %. General:    Lethargic and toxic appearing, cachectic appearing, no apparent distress  Head:  Normocephalic, atraumatic  Eyes:  Sclerae appear normal.  Pupils equally round. ENT:  Nares appear normal, no drainage. Moist oral mucosa  Neck:  No restricted ROM. Trachea midline   CV:   RRR. No jugular venous distension. Lungs:   CTAB. No wheezing, rhonchi, or rales. Respirations even, unlabored  Abdomen: Bowel sounds present. Soft, nontender, nondistended.   Extremities: No cyanosis or clubbing. No edema  Skin:     No rashes and normal coloration. Warm and dry. Neuro:  CN II-XII grossly intact. Sensation intact. A&Ox3  Psych:  Normal mood and affect. I have reviewed ordered lab tests and independently visualized imaging below:    Recent Labs:  Recent Results (from the past 48 hour(s))   CULTURE, BLOOD    Collection Time: 04/13/22  7:20 PM    Specimen: Blood   Result Value Ref Range    Special Requests: RIGHT  FOREARM        Culture result: NO GROWTH 2 DAYS     LACTIC ACID    Collection Time: 04/13/22  7:20 PM   Result Value Ref Range    Lactic acid 2.1 (H) 0.4 - 2.0 MMOL/L   CBC WITH AUTOMATED DIFF    Collection Time: 04/13/22  7:20 PM   Result Value Ref Range    WBC 2.6 (L) 4.3 - 11.1 K/uL    RBC 2.32 (L) 4.05 - 5.2 M/uL    HGB 6.1 (LL) 11.7 - 15.4 g/dL    HCT 19.1 (L) 35.8 - 46.3 %    MCV 82.3 79.6 - 97.8 FL    MCH 26.3 26.1 - 32.9 PG    MCHC 31.9 31.4 - 35.0 g/dL    RDW 18.8 (H) 11.9 - 14.6 %    PLATELET 25 (LL) 273 - 450 K/uL    MPV 10.9 9.4 - 12.3 FL    ABSOLUTE NRBC 0.00 0.0 - 0.2 K/uL    NEUTROPHILS 85 (H) 43 - 78 %    LYMPHOCYTES 11 (L) 13 - 44 %    MONOCYTES 1 (L) 4.0 - 12.0 %    EOSINOPHILS 1 0.5 - 7.8 %    BASOPHILS 0 0.0 - 2.0 %    IMMATURE GRANULOCYTES 2 0.0 - 5.0 %    ABS. NEUTROPHILS 2.2 1.7 - 8.2 K/UL    ABS. LYMPHOCYTES 0.3 (L) 0.5 - 4.6 K/UL    ABS. MONOCYTES 0.0 (L) 0.1 - 1.3 K/UL    ABS. EOSINOPHILS 0.0 0.0 - 0.8 K/UL    ABS. BASOPHILS 0.0 0.0 - 0.2 K/UL    ABS. IMM.  GRANS. 0.1 0.0 - 0.5 K/UL    RBC COMMENTS MODERATE  ANISOCYTOSIS + POIKILOCYTOSIS        WBC COMMENTS Result Confirmed By Smear      PLATELET COMMENTS MARKED      DF AUTOMATED     METABOLIC PANEL, COMPREHENSIVE    Collection Time: 04/13/22  7:20 PM   Result Value Ref Range    Sodium 143 136 - 145 mmol/L    Potassium 3.1 (L) 3.5 - 5.1 mmol/L    Chloride 117 (H) 98 - 107 mmol/L    CO2 13 (L) 21 - 32 mmol/L    Anion gap 13 7 - 16 mmol/L    Glucose 112 (H) 65 - 100 mg/dL    BUN 37 (H) 8 - 23 MG/DL    Creatinine 2.10 (H) 0.6 - 1.0 MG/DL    GFR est AA 30 (L) >60 ml/min/1.73m2    GFR est non-AA 25 (L) >60 ml/min/1.73m2    Calcium 8.7 8.3 - 10.4 MG/DL    Bilirubin, total 0.4 0.2 - 1.1 MG/DL    ALT (SGPT) 15 12 - 65 U/L    AST (SGOT) 17 15 - 37 U/L    Alk.  phosphatase 51 50 - 136 U/L    Protein, total 5.8 (L) 6.3 - 8.2 g/dL    Albumin 1.9 (L) 3.2 - 4.6 g/dL    Globulin 3.9 (H) 2.3 - 3.5 g/dL    A-G Ratio 0.5 (L) 1.2 - 3.5     PROCALCITONIN    Collection Time: 04/13/22  7:20 PM   Result Value Ref Range    Procalcitonin 0.30 0.00 - 0.49 ng/mL   MAGNESIUM    Collection Time: 04/13/22  7:20 PM   Result Value Ref Range    Magnesium 2.0 1.8 - 2.4 mg/dL   PHOSPHORUS    Collection Time: 04/13/22  7:20 PM   Result Value Ref Range    Phosphorus 4.2 (H) 2.3 - 3.7 MG/DL   TSH 3RD GENERATION    Collection Time: 04/13/22  7:20 PM   Result Value Ref Range    TSH 1.040 0.358 - 3.740 uIU/mL   CULTURE, BLOOD    Collection Time: 04/13/22  7:27 PM    Specimen: Blood   Result Value Ref Range    Special Requests: LEFT  FOREARM        Culture result: NO GROWTH 2 DAYS     URINALYSIS W/ RFLX MICROSCOPIC    Collection Time: 04/13/22  8:16 PM   Result Value Ref Range    Color YELLOW      Appearance CLOUDY      Specific gravity 1.025 (H) 1.001 - 1.023      pH (UA) 6.0 5.0 - 9.0      Protein 100 (A) NEG mg/dL    Glucose Negative mg/dL    Ketone Negative NEG mg/dL    Bilirubin Negative NEG      Blood MODERATE (A) NEG      Urobilinogen 0.2 0.2 - 1.0 EU/dL    Nitrites Negative NEG      Leukocyte Esterase SMALL AMOUNT (A) NEG     URINE MICROSCOPIC    Collection Time: 04/13/22  8:16 PM   Result Value Ref Range    WBC 20-50 0 /hpf    RBC 10-20 0 /hpf    Epithelial cells 0 0 /hpf    Bacteria TRACE 0 /hpf    Casts HYALINE 0 /lpf    Crystals, urine 0 0 /LPF    Mucus 0 0 /lpf   CULTURE, URINE    Collection Time: 04/13/22  8:30 PM    Specimen: Clean catch; Urine   Result Value Ref Range    Special Requests: NO SPECIAL REQUESTS      Culture result: >100,000 COLONIES/mL MIXED SKIN KAILASH ISOLATED     RBC, ALLOCATE    Collection Time: 04/13/22  8:45 PM   Result Value Ref Range    HISTORY CHECKED? Historical check performed    LACTIC ACID    Collection Time: 04/13/22  9:14 PM   Result Value Ref Range    Lactic acid 1.7 0.4 - 2.0 MMOL/L   TYPE & SCREEN    Collection Time: 04/13/22  9:59 PM   Result Value Ref Range    Crossmatch Expiration 04/16/2022,2359     ABO/Rh(D) O NEGATIVE     Antibody screen NEG     Comment       [facility] CALLED ER POD B, SPOKE TO ROSE AT 2246 4.13.22 EOR    Unit number [id number]     Blood component type RC LR     Unit division 00     Status of unit TRANSFUSED     Crossmatch result Compatible    EKG, 12 LEAD, INITIAL    Collection Time: 04/14/22 12:55 AM   Result Value Ref Range    Ventricular Rate 80 BPM    Atrial Rate 80 BPM    QRS Duration 108 ms    Q-T Interval 444 ms    QTC Calculation (Bezet) 513 ms    Calculated R Axis 12 degrees    Calculated T Axis 41 degrees    Diagnosis       NSR (small p waves). PACs. Low voltage, precordial leads  Prolonged QT interval    Confirmed by Debby Lawson ([id number]) on 4/14/2022 11:26:00 AM     CBC WITH AUTOMATED DIFF    Collection Time: 04/14/22  4:42 PM   Result Value Ref Range    WBC 1.8 (LL) 4.3 - 11.1 K/uL    RBC 2.91 (L) 4.05 - 5.2 M/uL    HGB 7.7 (L) 11.7 - 15.4 g/dL    HCT 23.8 (L) 35.8 - 46.3 %    MCV 81.8 79.6 - 97.8 FL    MCH 26.5 26.1 - 32.9 PG    MCHC 32.4 31.4 - 35.0 g/dL    RDW 18.2 (H) 11.9 - 14.6 %    PLATELET 16 (LL) 486 - 450 K/uL    MPV 10.0 9.4 - 12.3 FL    ABSOLUTE NRBC 0.00 0.0 - 0.2 K/uL    NEUTROPHILS 84 (H) 43 - 78 %    LYMPHOCYTES 13 13 - 44 %    MONOCYTES 0 (L) 4.0 - 12.0 %    EOSINOPHILS 1 0.5 - 7.8 %    BASOPHILS 0 0.0 - 2.0 %    IMMATURE GRANULOCYTES 2 0.0 - 5.0 %    ABS. NEUTROPHILS 1.6 (L) 1.7 - 8.2 K/UL    ABS. LYMPHOCYTES 0.2 (L) 0.5 - 4.6 K/UL    ABS. MONOCYTES 0.0 (L) 0.1 - 1.3 K/UL    ABS. EOSINOPHILS 0.0 0.0 - 0.8 K/UL    ABS.  BASOPHILS 0.0 0.0 - 0.2 K/UL ABS. IMM. GRANS. 0.0 0.0 - 0.5 K/UL    RBC COMMENTS MODERATE  ANISOCYTOSIS + POIKILOCYTOSIS        RBC COMMENTS SLIGHT  HYPOCHROMIA        WBC COMMENTS Result Confirmed By Smear      PLATELET COMMENTS MARKED      DF AUTOMATED     METABOLIC PANEL, COMPREHENSIVE    Collection Time: 04/14/22  4:42 PM   Result Value Ref Range    Sodium 147 (H) 136 - 145 mmol/L    Potassium 2.8 (L) 3.5 - 5.1 mmol/L    Chloride 121 (H) 98 - 107 mmol/L    CO2 13 (L) 21 - 32 mmol/L    Anion gap 13 7 - 16 mmol/L    Glucose 87 65 - 100 mg/dL    BUN 38 (H) 8 - 23 MG/DL    Creatinine 1.70 (H) 0.6 - 1.0 MG/DL    GFR est AA 38 (L) >60 ml/min/1.73m2    GFR est non-AA 32 (L) >60 ml/min/1.73m2    Calcium 8.9 8.3 - 10.4 MG/DL    Bilirubin, total 0.6 0.2 - 1.1 MG/DL    ALT (SGPT) 14 12 - 65 U/L    AST (SGOT) 26 15 - 37 U/L    Alk.  phosphatase 49 (L) 50 - 136 U/L    Protein, total 6.0 (L) 6.3 - 8.2 g/dL    Albumin 1.9 (L) 3.2 - 4.6 g/dL    Globulin 4.1 (H) 2.3 - 3.5 g/dL    A-G Ratio 0.5 (L) 1.2 - 3.5     PTT    Collection Time: 04/14/22  4:42 PM   Result Value Ref Range    aPTT 31.3 24.1 - 35.1 SEC   PROTHROMBIN TIME + INR    Collection Time: 04/14/22  4:42 PM   Result Value Ref Range    Prothrombin time 14.3 12.6 - 14.5 sec    INR 1.1     FIBRINOGEN    Collection Time: 04/14/22  4:42 PM   Result Value Ref Range    Fibrinogen 713 (H) 190 - 501 mg/dL   CBC WITH AUTOMATED DIFF    Collection Time: 04/15/22  5:21 AM   Result Value Ref Range    WBC 1.0 (LL) 4.3 - 11.1 K/uL    RBC 3.12 (L) 4.05 - 5.2 M/uL    HGB 8.0 (L) 11.7 - 15.4 g/dL    HCT 25.4 (L) 35.8 - 46.3 %    MCV 81.4 79.6 - 97.8 FL    MCH 25.6 (L) 26.1 - 32.9 PG    MCHC 31.5 31.4 - 35.0 g/dL    RDW 18.6 (H) 11.9 - 14.6 %    PLATELET 11 (LL) 352 - 450 K/uL    MPV 11.8 9.4 - 12.3 FL    ABSOLUTE NRBC 0.00 0.0 - 0.2 K/uL    NEUTROPHILS 67 43 - 78 %    LYMPHOCYTES 27 13 - 44 %    MONOCYTES 0 (L) 4.0 - 12.0 %    EOSINOPHILS 1 0.5 - 7.8 %    BASOPHILS 2 0.0 - 2.0 %    IMMATURE GRANULOCYTES 3 0.0 - 5.0 %    ABS. NEUTROPHILS 0.7 (L) 1.7 - 8.2 K/UL    ABS. LYMPHOCYTES 0.3 (L) 0.5 - 4.6 K/UL    ABS. MONOCYTES 0.0 (L) 0.1 - 1.3 K/UL    ABS. EOSINOPHILS 0.0 0.0 - 0.8 K/UL    ABS. BASOPHILS 0.0 0.0 - 0.2 K/UL    ABS. IMM. GRANS. 0.0 0.0 - 0.5 K/UL    RBC COMMENTS SLIGHT  ANISOCYTOSIS + POIKILOCYTOSIS        WBC COMMENTS Result Confirmed By Smear      PLATELET COMMENTS MARKED      DF AUTOMATED     METABOLIC PANEL, COMPREHENSIVE    Collection Time: 04/15/22  5:21 AM   Result Value Ref Range    Sodium 148 (H) 136 - 145 mmol/L    Potassium 3.4 (L) 3.5 - 5.1 mmol/L    Chloride 122 (H) 98 - 107 mmol/L    CO2 12 (L) 21 - 32 mmol/L    Anion gap 14 7 - 16 mmol/L    Glucose 90 65 - 100 mg/dL    BUN 42 (H) 8 - 23 MG/DL    Creatinine 1.70 (H) 0.6 - 1.0 MG/DL    GFR est AA 38 (L) >60 ml/min/1.73m2    GFR est non-AA 32 (L) >60 ml/min/1.73m2    Calcium 9.2 8.3 - 10.4 MG/DL    Bilirubin, total 0.6 0.2 - 1.1 MG/DL    ALT (SGPT) 14 12 - 65 U/L    AST (SGOT) 27 15 - 37 U/L    Alk.  phosphatase 53 50 - 136 U/L    Protein, total 6.2 (L) 6.3 - 8.2 g/dL    Albumin 2.0 (L) 3.2 - 4.6 g/dL    Globulin 4.2 (H) 2.3 - 3.5 g/dL    A-G Ratio 0.5 (L) 1.2 - 3.5     PLATELETS, ALLOCATE    Collection Time: 04/15/22  7:00 AM   Result Value Ref Range    Unit number [id number]     Blood component type PLPH,LR2     Unit division 00     Status of unit ALLOCATED        All Micro Results     Procedure Component Value Units Date/Time    CULTURE, URINE [[id number]] Collected: 04/13/22 2030    Order Status: Completed Specimen: Urine from Clean catch Updated: 04/15/22 0876     Special Requests: NO SPECIAL REQUESTS        Culture result:       >100,000 COLONIES/mL MIXED SKIN KAILASH ISOLATED          BLOOD CULTURE [[id number]] Collected: 04/13/22 1920    Order Status: Completed Specimen: Blood Updated: 04/15/22 0722     Special Requests: --        RIGHT  FOREARM       Culture result: NO GROWTH 2 DAYS       BLOOD CULTURE [[id number]] Collected: 04/13/22 1927 Order Status: Completed Specimen: Blood Updated: 04/15/22 0722     Special Requests: --        LEFT  FOREARM       Culture result: NO GROWTH 2 DAYS             Other Studies:  MRI BRAIN W WO CONT    Result Date: 4/14/2022  EXAMINATION: BRAIN MRI 4/14/2022 1:24 PM ACCESSION NUMBER: [id number] INDICATION: AMS, bladder cancer COMPARISON: Head CT 4/13/2022 TECHNIQUE: Multiplanar multisequence MRI of the brain without and with intravenous administration of 11 cc ProHance contrast agent. FINDINGS: Technically difficult exam due to patient motion. Midline structures including the corpus callosum, pituitary gland, optic nerves, and cerebellum are well developed. There are widespread symmetric T2 hyperintensities throughout the supratentorial white matter, including extension along both external capsules and along the posterior limb of both internal capsules. There is a moderate-sized focus of restricted diffusion involving the cortex and underlying white matter of the right occipital lobe. This is an acute or early subacute infarction. There is trace associated petechial hemorrhage (gradient image 14). There is mild mass effect upon the adjacent occipital horn of the right lateral ventricle. The ventricles are otherwise appropriate in caliber for the otherwise symmetric degree of global brain parenchymal volume loss. There is no midline shift. The basilar cisterns are patent. There is no cerebellar tonsillar ectopia or herniation. No definite evidence of intracranial blood products within the limits of significant motion degradation the gradient images. There is no abnormal intra or extra-axial postcontrast enhancement. There are no suspicious osseous lesions. 1. Widespread symmetric T2 hyperintensities throughout the supratentorial white matter, consistent with a diffuse leukoencephalopathy. Differential considerations favor a toxic or metabolic etiology.  Correlation for side medication side effects or recent toxic ingestion is recommended. 2. Moderate size acute or early subacute infarction in the right occipital lobe. There is mild local mass effect and trace associated petechial hemorrhage. 3. No evidence of intracranial metastatic disease. DC4 The significant findings in this report have been referred to the Imaging Navigator in order to communicate to the referring provider or his/her designee as outlined in Section II.C.2.a.ii-iii of the ACR Practice Guideline for Communication of Diagnostic Imaging Findings. Current Meds:  Current Facility-Administered Medications   Medication Dose Route Frequency    0.9% sodium chloride infusion 250 mL  250 mL IntraVENous PRN    methylPREDNISolone (PF) (Solu-MEDROL) 500 mg in 0.9% sodium chloride 100 mL IVPB  500 mg IntraVENous BID    pantoprazole (PROTONIX) 40 mg in 0.9% sodium chloride 10 mL injection  40 mg IntraVENous DAILY    sodium chloride (NS) flush 5-40 mL  5-40 mL IntraVENous Q8H    sodium chloride (NS) flush 5-40 mL  5-40 mL IntraVENous PRN    acetaminophen (TYLENOL) tablet 650 mg  650 mg Oral Q6H PRN    Or    acetaminophen (TYLENOL) suppository 650 mg  650 mg Rectal Q6H PRN    polyethylene glycol (MIRALAX) packet 17 g  17 g Oral DAILY PRN    ondansetron (ZOFRAN ODT) tablet 4 mg  4 mg Oral Q8H PRN    Or    ondansetron (ZOFRAN) injection 4 mg  4 mg IntraVENous Q6H PRN    dextrose 5% - 0.45% NaCl with KCl 20 mEq/L 1,000 mL with sodium bicarbonate (8.4%) 50 mEq infusion   IntraVENous CONTINUOUS    cefepime (MAXIPIME) 1 g in 0.9% sodium chloride (MBP/ADV) 50 mL MBP  1 g IntraVENous Q24H    sodium chloride (NS) flush 5-10 mL  5-10 mL IntraVENous Q8H    sodium chloride (NS) flush 5-10 mL  5-10 mL IntraVENous PRN    0.9% sodium chloride infusion 250 mL  250 mL IntraVENous PRN       Signed: Fany Bronson DO    Part of this note may have been written by using a voice dictation software.   The note has been proof read but may still contain some grammatical/other typographical errors.

## 2022-04-15 NOTE — PROGRESS NOTES
ordered 3 meals per day for Saturday and Sunday for the   He will also get supper on Friday    Good visit with family  Petrona    Spoke with staff about prognosis

## 2022-04-15 NOTE — PROGRESS NOTES
04/14/22 2155   Dual Skin Pressure Injury Assessment   Dual Skin Pressure Injury Assessment WDL   Second Care Provider (Based on [address] [place] [address]) Obdulia Muse RN   Skin Integumentary   Skin Integumentary (WDL) X    Pressure  Injury Documentation No Pressure Injury Noted-Pressure Ulcer Prevention Initiated   Skin Color Pale;Ecchymosis (comment); Red  (sacrum, gluteal fold, perivaginal red)   Skin Condition/Temp Fragile;Cool;Peeling  (gluteal fold red, peeling )   Skin Integrity Excoriation; Incision (comment); Other (comment)  (neph tube, gluteal fold open, peeling, incision with pus abd)   Nails X   Exceptions to WDL Thick; Discolored   Wound Prevention and Protection Methods   Orientation of Wound Prevention Posterior   Location of Wound Prevention Sacrum/Coccyx   Dressing Present  Yes   Dressing Status Intact   Wound Offloading (Prevention Methods) Bed, pressure reduction mattress;Pillows;Repositioning     Pt has R nephrostomy tube. Pt has an incision (per ) on lower abdomen that has pus and redness. Gluteal fold is red, peeling, open. Perivaginal area red.

## 2022-04-15 NOTE — PROGRESS NOTES
Chart screened by CM for d/c planning. Pt resides with her boyfriend Dutch Pardo [phone] in the [facility] on [address] [facility]. in [facility]. Pt does not have any family in the area. Pt has a rolling walker and requires assistance with ADLs. Her boyfriend provides this assistance. Pt has insurance with pharmacy benefits and a PCP. Pt presented to the ED with c/o \"being minimally responsive. \"  Pt admitted for Dx of Metabolic encephalopathy with AMS, SIRS, Anemia, Thrombocytopenia, UTI, RA, Hematuria, Bladder CA, Chronic pain, CHF, and Sleep apnea. Pt is followed by Dr. Lakesha High for Dx of Stage IV bladder CA. CM sent Dr. Uriel Bernal a message via [employer] to inquire when he feels comfortable with CM ordering PT and OT consults. Will wait on this as pt has low platelet count. Anticipated d/c plan is for [facility] when medically stable. CM will continue to follow and remain available if any needs arise. Care Management Interventions  PCP Verified by CM: Yes Yana Klein, [id number])  Mode of Transport at Discharge:  Other (see comment) (Boyfriend)  Transition of Care Consult (CM Consult): Discharge Planning  Physical Therapy Consult: No  Occupational Therapy Consult: No  Speech Therapy Consult: No  Support Systems: Spouse/Significant Other  Confirm Follow Up Transport: Family  The Patient and/or Patient Representative was Provided with a Choice of Provider and Agrees with the Discharge Plan?: Yes  Name of the Patient Representative Who was Provided with a Choice of Provider and Agrees with the Discharge Plan: Tiana Blount and Dutch Pardo (boyfriend)  Freedom of Choice List was Provided with Basic Dialogue that Supports the Patient's Individualized Plan of Care/Goals, Treatment Preferences and Shares the Quality Data Associated with the Providers?: Yes   Resource Information Provided?: No  Discharge Location  Patient Expects to be Discharged to[de-identified] Home with family assistance

## 2022-04-15 NOTE — ED NOTES
TRANSFER - OUT REPORT:    Verbal report given to Adry RN(name) on Alejandro Gadnhi  being transferred to [facility](unit) for routine progression of care       Report consisted of patients Situation, Background, Assessment and   Recommendations(SBAR). Information from the following report(s) SBAR, ED Summary and MAR was reviewed with the receiving nurse. Lines:   Venous Access Device [employer] Port 03/08/22 Upper chest (subclavicular area, right (Active)       Peripheral IV 04/13/22 Anterior;Right Forearm (Active)        Opportunity for questions and clarification was provided.       Patient transported with:   [employer]

## 2022-04-15 NOTE — WOUND CARE
Labia, external vulva, perineum and gluteal cleft skin with severe denuded skin, known bladder cancer, concern for ?? Ashley's disease? ? Versus severe yeast/ bacterial scalding of skin in these folds. Consider dermatologist if needed. Recommend silvadene to areas. The skin on abdomen and around tubes can be protected with zinc based cream, foam dressing to weeping scar of the abdomen.

## 2022-04-15 NOTE — PROGRESS NOTES
END OF SHIFT NOTE:    Intake/Output  No intake/output data recorded. Voiding: YES  Catheter: NO  Drain:   External Urinary Catheter 04/13/22 (Active)   Site Assessment Clean, dry, & intact 04/15/22 1611   Repositioned No 04/15/22 1611   Perineal Care Yes 04/15/22 1611   Suction Canister/Tubing Changed No 04/15/22 1611   Urine Output (mL) 100 ml 04/15/22 1611               Stool:  2 occurrences. Stool Assessment  Stool Color: Brown (04/15/22 0636)  Stool Appearance: Loose (04/15/22 0714)  Stool Amount: Medium (04/15/22 0636)  Stool Source/Status: Incontinence; Rectum (04/15/22 0636)    Emesis:  0 occurrences. VITAL SIGNS  Patient Vitals for the past 12 hrs:   Temp Pulse Resp BP SpO2   04/15/22 1947 98.3 °F (36.8 °C) 93 16 116/83 97 %   04/15/22 1500 97.9 °F (36.6 °C) (!) 102 17 100/72 100 %   04/15/22 1408 98.1 °F (36.7 °C) 94 20 105/65 99 %   04/15/22 1336 98.7 °F (37.1 °C) (!) 101 19 (!) 112/59 100 %   04/15/22 1305 97.5 °F (36.4 °C) (!) 106 20 100/83 100 %   04/15/22 1120 97.8 °F (36.6 °C) (!) 111 18 131/61 100 %   04/15/22 0800 98.3 °F (36.8 °C) 72 18 118/78 --       Pain Assessment  Pain 1  Pain Scale 1: Visual (04/15/22 0600)  Pain Intensity 1: 0 (04/15/22 0600)  Patient Stated Pain Goal: 0 (04/15/22 0600)    Ambulating  No    Additional Information: Patient family at bedside and needs assistance with meal tickets. Shift report given to oncoming nurse at the bedside.     Lana Ormond, RN

## 2022-04-15 NOTE — ACP (ADVANCE CARE PLANNING)
Advance Care Planning     General Advance Care Planning (ACP) Conversation      Date of Conversation: 4/13/2022  Conducted with: Patient with Decision Making Capacity and Healthcare Decision Maker: Next of Kin by law (only applies in absence of a Healthcare Power of  or Legal Guardian)    Healthcare Decision Maker:     Primary Decision Maker: zeny reynolds - [phone]  Click here to complete [id number] including selection of the Healthcare Decision Maker Relationship (ie \"Primary\")      Today we documented Decision Maker(s) consistent with Legal Next of Kin hierarchy.     Content/Action Overview:   Has NO ACP documents/care preferences - refer to ACP Clinical Specialist  Reviewed DNR/DNI and patient elects Full Code (Attempt Resuscitation)  Topics discussed: ventilation preferences, hospitalization preferences and resuscitation preferences  Additional Comments: N/A     Length of Voluntary ACP Conversation in minutes:  <16 minutes (Non-Billable)    Fortino De La Torre LMSW

## 2022-04-15 NOTE — CONSULTS
Consult    Patient: Shona Briseno MRN: [id number]     YOB: 1950  Age: 73 y.o. Sex: female      Subjective:      Shona Briseno is a 73 y.o. female who is being seen for altered mental status    Please see the patient's hematology/oncology note for details regarding her history of bladder cancer and treatment with chemotherapy. Over the last 3 days or so the patient has had worsening cognition which prompted her  to bring her into the hospital for further assessment. The patient had an MRI which shows severe confluent white matter disease, concerning for a toxic leukoencephalopathy. The patient also has continuous movements of her bilateral lower limbs. The patient cannot contribute to medical history. She briefly makes eye contact but is unable to respond verbally. Past Medical History:   Diagnosis Date    Cancer [facility]) 2021    bladder     Chronic pain     Back    Heart failure ([facility] [address]     Echo on 2022 showed Reduced left ventricular systolic function with a visually estimated EF of 45 - 50%. Normal diastolic function.     Sleep apnea     can not destinee c pap at hs     Past Surgical History:   Procedure Laterality Date    HX APPENDECTOMY      HX HERNIA REPAIR      HX HYSTERECTOMY      HX LAP CHOLECYSTECTOMY      HX ORTHOPAEDIC      toe transplant--- toe removed from left foot and placed on left hand    IR INSERT TUNL CVC W PORT OVER 5 YEARS  3/8/2022    IR NEPHROSTOMY PERC RT PLC CATH  SI  2022    NEUROLOGICAL PROCEDURE UNLISTED      back surgery      Family History   Problem Relation Age of Onset    Cancer Mother         colon    Cancer Father         lung    Cancer Brother         colon with mets to bladder    Cancer Paternal Uncle      Social History     Tobacco Use    Smoking status: Former Smoker     Packs/day: 1.00     Years: 50.00     Pack years: 50.00     Quit date:      Years since quittin.2    Smokeless tobacco: Never Used    Tobacco comment: began smoking age 17   Substance Use Topics    Alcohol use: Never      Current Facility-Administered Medications   Medication Dose Route Frequency Provider Last Rate Last Admin    0.9% sodium chloride infusion 250 mL  250 mL IntraVENous PRN Josie Fortune MD        methylPREDNISolone (PF) (Solu-MEDROL) 500 mg in 0.9% sodium chloride 100 mL IVPB  500 mg IntraVENous BID Ramon Angeles DO        pantoprazole (PROTONIX) 40 mg in 0.9% sodium chloride 10 mL injection  40 mg IntraVENous DAILY Ramon Angeles DO        sodium chloride (NS) flush 5-40 mL  5-40 mL IntraVENous Q8H Josie Fortune MD   10 mL at 04/15/22 0503    sodium chloride (NS) flush 5-40 mL  5-40 mL IntraVENous PRN Josie Fortune MD        acetaminophen (TYLENOL) tablet 650 mg  650 mg Oral Q6H PRN Josie Fortune MD        Or    acetaminophen (TYLENOL) suppository 650 mg  650 mg Rectal Q6H PRN Josie Fortune MD        polyethylene glycol (MIRALAX) packet 17 g  17 g Oral DAILY PRN Josie Fortune MD        ondansetron (ZOFRAN ODT) tablet 4 mg  4 mg Oral Q8H PRN Josie Fortune MD        Or    ondansetron [facility]) injection 4 mg  4 mg IntraVENous Q6H PRN Josie Fortune MD        dextrose 5% - 0.45% NaCl with KCl 20 mEq/L 1,000 mL with sodium bicarbonate (8.4%) 50 mEq infusion   IntraVENous CONTINUOUS Josie Fortune MD 75 mL/hr at 04/14/22 0419 New Bag at 04/14/22 0419    cefepime (MAXIPIME) 1 g in 0.9% sodium chloride (MBP/ADV) 50 mL MBP  1 g IntraVENous Q24H ZuleimaesVictorino trent DO   IV Completed at 04/14/22 2105    sodium chloride (NS) flush 5-10 mL  5-10 mL IntraVENous Q8H Yvrose Vasquez MD   10 mL at 04/15/22 0503    sodium chloride (NS) flush 5-10 mL  5-10 mL IntraVENous PRN Eleno Vasquez MD        0.9% sodium chloride infusion 250 mL  250 mL IntraVENous PRN Flavio Velasquez MD            Allergies   Allergen Reactions    Doxycycline Rash     Other reaction(s): Rash-Allergy    Fentanyl Rash     Other reaction(s): Rash-Allergy    Morphine Rash     Other reaction(s): Rash-Allergy       Review of Systems:  Could not obtain due to altered mental status        Objective:     Vitals:    04/14/22 1430 04/14/22 2208 04/15/22 0349 04/15/22 0800   BP: 111/71 (!) 117/51 124/73 118/78   Pulse:  89 (!) 110 72   Resp:  22 20 18   Temp:  98.7 °F (37.1 °C) 98.4 °F (36.9 °C) 98.3 °F (36.8 °C)   SpO2:  100% 100%    Weight:       Height:            Physical Exam:  General: Nonverbal.  Tracks the examiner briefly. HEENT - Normocephalic, atraumatic. Conjunctiva, tympanic membranes, and oropharynx are clear. Neck - Supple without masses, no bruits   Cardiovascular - Regular rate and rhythm. Normal S1, S2 without murmurs, rubs, or gallops. Lungs - Clear to auscultation. Abdomen - Soft, nontender with normal bowel sounds. Extremities - Peripheral pulses intact. No edema and no rashes. Neurological examination -    Comprehension is poor. Attention is poor. She is currently nonverbal.  Minor facial droop on the left. Language and speech cannot be assessed. On cranial nerve examination pupils are equal round and reactive to light. She could not participate in funduscopic examination, visual acuity, visual field testing, or extraocular motility testing due to altered mental status. Motor examination: On motor examination, the patient has increased muscle tone throughout with continuous clonus in the feet, bilaterally. She cannot participate in strength testing. Muscle stretch reflexes are absent in the bilateral lower extremities and 1+ at the brachioradialis, bilaterally. Toes are downgoing to plantar stimulation.   She cannot participate in sensory examination, cerebellar examination, gait or stance        Lab Results   Component Value Date/Time    Cholesterol, total 152 02/02/2022 08:58 AM    HDL Cholesterol 33 (L) 02/02/2022 08:58 AM    LDL, calculated 94 02/02/2022 08:58 AM    VLDL, calculated 25 (H) 02/02/2022 08:58 AM    Triglyceride 125 02/02/2022 08:58 AM    CHOL/HDL Ratio 4.6 02/02/2022 08:58 AM        No results found for: HBA1C, [id number], [id number]     CT Results (most recent): Personally Reviewed   Results from [place] encounter on 04/13/22    CT HEAD WO CONT    Narrative  Noncontrast head CT    Clinical Indication: Patient found down, unresponsive, severe weakness,  tachycardic. Stage IV bladder cancer. Technique: Noncontrast axial images were obtained through the brain. All CT  scans at this location are performed using dose modulation techniques as  appropriate including the following: Automated exposure control, adjustment of  the MA and/or kV according to patient's size, or use of iterative reconstruction  technique. Reformatted sagittal, coronal images obtained to further evaluate  bones, soft tissues, extra-axial spaces. Comparison: None available    Findings: There is no acute intracranial hemorrhage, hydrocephalus, intra-axial  mass, herniation, midline shift, or mass-effect. Scattered mild nonspecific  white matter hypodensities, most often chronic small vessel ischemic change but  indeterminate by CT without prior. There is no CT evidence of acute large artery  territorial infarction or abnormal extra-axial fluid collection. The mastoid air cells and paranasal sinuses are clear where imaged. No displaced skull fractures are present. Impression  1. No hemorrhage. 2. Nonspecific white matter hypodensities. .      Results for orders placed or performed during the hospital encounter of 04/13/22   EKG, 12 LEAD, INITIAL   Result Value Ref Range    Ventricular Rate 80 BPM    Atrial Rate 80 BPM    QRS Duration 108 ms    Q-T Interval 444 ms    QTC Calculation (Bezet) 513 ms    Calculated R Axis 12 degrees    Calculated T Axis 41 degrees    Diagnosis       NSR (small p waves). PACs.    Low voltage, precordial leads  Prolonged QT interval    Confirmed by Dae Figueroa ([id number]) on 4/14/2022 11:26:00 AM     Results for orders placed or performed in visit on 02/02/22   AMB POC EKG ROUTINE W/ 12 LEADS, INTER & REP    Impression    Sinus  Rhythm 68bpm - occasional ectopic ventricular beat     -Nonspecific ST/T wave changes  Baseline artifact  Normal axis and intervals           MRI Results (most recent): Personally Reviewed  Results from [place] encounter on 04/13/22    MRI BRAIN W WO CONT    Narrative  EXAMINATION: BRAIN MRI 4/14/2022 1:24 PM    ACCESSION NUMBER: [id number]    INDICATION: AMS, bladder cancer    COMPARISON: Head CT 4/13/2022    TECHNIQUE: Multiplanar multisequence MRI of the brain without and with  intravenous administration of 11 cc ProHance contrast agent. FINDINGS:    Technically difficult exam due to patient motion. Midline structures including the corpus callosum, pituitary gland, optic nerves,  and cerebellum are well developed. There are widespread symmetric T2 hyperintensities throughout the supratentorial  white matter, including extension along both external capsules and along the  posterior limb of both internal capsules. There is a moderate-sized focus of restricted diffusion involving the cortex and  underlying white matter of the right occipital lobe. This is an acute or early  subacute infarction. There is trace associated petechial hemorrhage (gradient  image 14). There is mild mass effect upon the adjacent occipital horn of the  right lateral ventricle. The ventricles are otherwise appropriate in caliber for the otherwise symmetric  degree of global brain parenchymal volume loss. There is no midline shift. The basilar cisterns are patent. There is no  cerebellar tonsillar ectopia or herniation. No definite evidence of intracranial blood products within the limits of  significant motion degradation the gradient images. There is no abnormal intra or extra-axial postcontrast enhancement.     There are no suspicious osseous lesions. Impression  1. Widespread symmetric T2 hyperintensities throughout the supratentorial white  matter, consistent with a diffuse leukoencephalopathy. Differential  considerations favor a toxic or metabolic etiology. Correlation for side  medication side effects or recent toxic ingestion is recommended. 2. Moderate size acute or early subacute infarction in the right occipital lobe. There is mild local mass effect and trace associated petechial hemorrhage. 3. No evidence of intracranial metastatic disease. DC4  The significant findings in this report have been referred to the Imaging  Navigator in order to communicate to the referring provider or his/her designee  as outlined in Section II.C.2.a.ii-iii of the ACR Practice Guideline for  Communication of Diagnostic Imaging Findings. 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 In addition, MRI does show an occipital lobe infarct, but this is not contributing to her clinical presentation. I did investigate her specific chemotherapy regimen and the development of toxic leukoencephalopathy and I do not see case reports in which these medications were a cause. 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 Imaging finding is not consistent with posterior reversible encephalopathy syndrome and is not consistent with PML. Her abnormal movements represent upper motor neuron signs due to extensive white matter damage. In these patients with acute toxic leukoencephalopathies, I often do a trial of methylprednisolone. Plan:     Methylprednisolone 500 mg twice daily. GI prophylactics ordered    The degree of her leukoencephalopathy is very severe.   High degree of concern for poor neurological prognosis, but we will do our best to help    Signed By: Yaneth Sánchez,      April 15, 2022

## 2022-04-16 NOTE — PROGRESS NOTES
Patient has not responded during am shift. Oral mucosa has become dry and cracked. Oral care completed to the best of nurses ability. Patient resists swab when attempting to clean. Appears to be grimacing at times.   at bedside and very emotional.

## 2022-04-16 NOTE — PROGRESS NOTES
[facility] Hematology & Oncology        Inpatient Hematology / Oncology Progress Note      Admission Date: 2022  7:08 PM  Reason for Admission/Hospital Course: AMS (altered mental status) [R41.82]      24 Hour Events:  Afeb, VSS  Unresponsive to tactile/verbal stimuli  Counts dropping   at bedside    ROS:  Unable to assess as pt not very responsive. She does not speak during rounding. 10 point review of systems is otherwise negative with the exception of the elements mentioned above in the HPI. Allergies   Allergen Reactions    Doxycycline Rash     Other reaction(s): Rash-Allergy    Fentanyl Rash     Other reaction(s): Rash-Allergy    Morphine Rash     Other reaction(s): Rash-Allergy       OBJECTIVE:  Patient Vitals for the past 8 hrs:   BP Temp Pulse Resp SpO2   22 0758 132/85 98.9 °F (37.2 °C) (!) 104 18 99 %   22 0300 123/83 98.7 °F (37.1 °C) 99 16 98 %     Temp (24hrs), Av.3 °F (36.8 °C), Min:97.5 °F (36.4 °C), Max:98.9 °F (37.2 °C)    No intake/output data recorded. Physical Exam:  Constitutional: Ill apeearing female in no acute distress, sitting comfortably in the hospital bed. HEENT: Normocephalic and atraumatic. No oral exam performed. supple without JVD. No thyromegaly present. Lymph node   Deferred    Skin Warm and dry. +nephrostomy tube with dressing. Respiratory Lungs are clear to auscultation bilaterally without wheezes, rales or rhonchi, normal air exchange without accessory muscle use. CVS Normal rate, regular rhythm and normal S1 and S2. No murmurs, gallops, or rubs. Abdomen Soft, nontender and nondistended, normoactive bowel sounds. No palpable mass. No hepatosplenomegaly. +R nephrostomy tube    Neuro Unable to fully assess as pt is not responsive. She does not speak at all during exam.    MSK Normal range of motion in general.  No edema and no tenderness. Psych Appropriate mood and affect.         Labs:      Recent Labs 04/16/22  0325 04/15/22  0521 04/14/22  1642   WBC 0.2* 1.0* 1.8*   RBC 2.37* 3.12* 2.91*   HGB 6.3* 8.0* 7.7*   HCT 19.2* 25.4* 23.8*   MCV 81.0 81.4 81.8   MCH 26.6 25.6* 26.5   MCHC 32.8 31.5 32.4   RDW 18.6* 18.6* 18.2*   PLT 31* 11* 16*   GRANS 29* 67 84*   LYMPH 71* 27 13   MONOS 0* 0* 0*   EOS 0* 1 1   BASOS 0 2 0   IG 0 3 2   DF AUTOMATED AUTOMATED AUTOMATED   ANEU 0.1* 0.7* 1.6*   ABL 0.1* 0.3* 0.2*   ABM 0.0* 0.0* 0.0*   DOMENICA 0.0 0.0 0.0   ABB 0.0 0.0 0.0   AIG 0.0 0.0 0.0        Recent Labs     04/16/22  0325 04/15/22  0521 04/14/22  1642 04/13/22  1920 04/13/22  1920   * 148* 147*   < > 143   K 3.2* 3.4* 2.8*   < > 3.1*   * 122* 121*   < > 117*   CO2 16* 12* 13*   < > 13*   AGAP 10 14 13   < > 13   * 90 87   < > 112*   BUN 47* 42* 38*   < > 37*   CREA 1.70* 1.70* 1.70*   < > 2.10*   GFRAA 38* 38* 38*   < > 30*   GFRNA 32* 32* 32*   < > 25*   CA 8.9 9.2 8.9   < > 8.7   AP 46* 53 49*   < > 51   TP 5.3* 6.2* 6.0*   < > 5.8*   ALB 1.7* 2.0* 1.9*   < > 1.9*   GLOB 3.6* 4.2* 4.1*   < > 3.9*   AGRAT 0.5* 0.5* 0.5*   < > 0.5*   MG  --   --   --   --  2.0   PHOS  --   --   --   --  4.2*    < > = values in this interval not displayed.          Imaging:    Medications:  Current Facility-Administered Medications   Medication Dose Route Frequency    0.9% sodium chloride infusion 250 mL  250 mL IntraVENous PRN    potassium chloride 40 mEq IVPB  40 mEq IntraVENous ONCE    0.9% sodium chloride infusion 250 mL  250 mL IntraVENous PRN    methylPREDNISolone (PF) (Solu-MEDROL) 500 mg in 0.9% sodium chloride 100 mL IVPB  500 mg IntraVENous BID    pantoprazole (PROTONIX) 40 mg in 0.9% sodium chloride 10 mL injection  40 mg IntraVENous DAILY    silver sulfADIAZINE (SILVADENE) 1 % topical cream   Topical DAILY    sodium chloride (NS) flush 5-40 mL  5-40 mL IntraVENous Q8H    sodium chloride (NS) flush 5-40 mL  5-40 mL IntraVENous PRN    acetaminophen (TYLENOL) tablet 650 mg  650 mg Oral Q6H PRN    Or  acetaminophen (TYLENOL) suppository 650 mg  650 mg Rectal Q6H PRN    polyethylene glycol (MIRALAX) packet 17 g  17 g Oral DAILY PRN    ondansetron (ZOFRAN ODT) tablet 4 mg  4 mg Oral Q8H PRN    Or    ondansetron (ZOFRAN) injection 4 mg  4 mg IntraVENous Q6H PRN    dextrose 5% - 0.45% NaCl with KCl 20 mEq/L 1,000 mL with sodium bicarbonate (8.4%) 50 mEq infusion   IntraVENous CONTINUOUS    cefepime (MAXIPIME) 1 g in 0.9% sodium chloride (MBP/ADV) 50 mL MBP  1 g IntraVENous Q24H    sodium chloride (NS) flush 5-10 mL  5-10 mL IntraVENous Q8H    sodium chloride (NS) flush 5-10 mL  5-10 mL IntraVENous PRN    0.9% sodium chloride infusion 250 mL  250 mL IntraVENous PRN         ASSESSMENT:    Problem List  Date Reviewed: 4/11/2022          Codes Class Noted    Leukoencephalopathy ICD-10-CM: G93.49  ICD-9-CM: 323.9  4/15/2022        Adult failure to thrive ICD-10-CM: R62.7  ICD-9-CM: 783.7  4/15/2022        Hypernatremia ICD-10-CM: E87.0  ICD-9-CM: 276.0  4/15/2022        Acute metabolic encephalopathy AYV-91-WO: G93.41  ICD-9-CM: 348.31  7/17/9304        Metabolic acidosis, normal anion gap (NAG) ICD-10-CM: E87.2  ICD-9-CM: 276.2  4/14/2022        Stroke ([facility] ICD-10-CM: I63.9  ICD-9-CM: 434.91  4/14/2022        Thrombocytopenia ([facility] ICD-10-CM: D69.6  ICD-9-CM: 287.5  4/13/2022        Pancytopenia ([facility] ICD-10-CM: B16.023  ICD-9-CM: 284.19  4/13/2022        Altered mental status ICD-10-CM: R41.82  ICD-9-CM: 780.97  4/13/2022        * (Principal) SIRS (systemic inflammatory response syndrome) ([address] ICD-10-CM: R65.10  ICD-9-CM: 995.90  4/13/2022        AMS (altered mental status) ICD-10-CM: R41.82  ICD-9-CM: 780.97  4/13/2022        Acute kidney injury ([place] [address].) ICD-10-CM: N17.9  ICD-9-CM: 584.9  4/13/2022        Elevated serum creatinine ICD-10-CM: R79.89  ICD-9-CM: 790.99  4/4/2022        Acute renal failure (ARF) ([place] [address].) ICD-10-CM: N17.9  ICD-9-CM: 584.9  2/23/2022        UTI (urinary tract infection) ICD-10-CM: N39.0  ICD-9-CM: 599.0  2/23/2022        Kidney congenitally absent, 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-CM: 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.0  ICD-9-CM: 753.0  2/23/2022        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 [facility]) ICD-10-CM: N13.30, C67.9  ICD-9-CM: 591, 188.9  2/23/2022        Tobacco use ICD-10-CM: Z72.0  ICD-9-CM: 305.1  1/6/1978        Systolic CHF, chronic (HCC) ICD-10-CM: I50.22  ICD-9-CM: 428.22, 428.0  2/2/2022    Overview Signed 2/2/2022  8:06 AM by Sivakumar Paulson MD     Echo 2019 [place] 45% with no severe valvular pathology             THAO (obstructive sleep apnea) ICD-10-CM: G47.33  ICD-9-CM: 327.23  2/2/2022        Hematuria ICD-10-CM: R31.9  ICD-9-CM: 599.70  12/30/2021        Bladder cancer ([facility] ICD-10-CM: C67.9  ICD-9-CM: 188.9  12/30/2021        Carpal tunnel syndrome of right wrist ICD-10-CM: G56.01  ICD-9-CM: 354.0  9/26/2019    Overview Signed 3/17/2022  3:55 PM by Ivonne Somers MD     Last Assessment & Plan:   Formatting of this note might be different from the original.  Continue naprosyn and wrist split started today. Pt will try this for 2-3 weeks. If not resolving we will refer to the hand center. Primary insomnia ICD-10-CM: F51.01  ICD-9-CM: 307.42  11/20/2017    Overview Signed 3/17/2022  3:55 PM by Ivonne Somers MD     Last Assessment & Plan:   Formatting of this note might be different from the original.  Chronic induction and maintenance insomnia. Chronic sleep deprivation and poor sleep hygiene. 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 Patient is undergoing reevaluation for previously diagnosed THAO and CPAP therapy will potentially amplify her already severe insomnia. Plan: Ambien 5 mg daily at bedtime, emphasized sleep hygiene.              Left ventricular diastolic dysfunction with preserved systolic function GFU-85-LD: I51.89  ICD-9-CM: 429.9  12/19/2016    Overview Signed 3/17/2022  3:55 PM by Ivonne Somers MD     Last Assessment & Plan:   Formatting of this note might be different from the original.  Grade 1 diastolic dysfunction on last ECHO. Patient has f/u with pcp in 2 weeks. Encouraged to review symptoms of shortness of breath and orthopnea with pcp to see if further evaluation is indicated. Irregular heart beat ICD-10-CM: I49.9  ICD-9-CM: 427.9  2/29/2016    Overview Signed 3/17/2022  3:55 PM by Jana Ko MD     Last Assessment & Plan:   Formatting of this note might be different from the original.  ECG showed a regular rate and rhythm. I discussed the patient with Dr. Teresa Barraza, and he agreed that no further intervention is indicated at this time. Patient is advised to follow up if symptoms develop or if occuring more frequently, I will set her up for a holter monitor at that point. Otherwise, we will continue her current regimen as prescribed. Hyperlipidemia ICD-10-CM: E78.5  ICD-9-CM: 272.4  2/12/2016    Overview Signed 3/17/2022  3:55 PM by Jana Ko MD     Last Assessment & Plan:   Formatting of this note might be different from the original.  Well controlled with Simvastatin 20 mg. Continue current medications and work on diet with exercise. PLAN:    Bladder cancer sp cycle one carbo/gem 4/5 and 4/12     Encephalopathy/mild left facial droop/left eye closed  -CT head without contrast negative  -MRI brain without contrast pending  -Neurology consulted  4/15 Brain MRI with concern for diffuse leukoencephalopathy as well as noted moderate size acute or early subacute infarction in the right occipital lobe with mild local mass effect and trace petechial hemorrhage. Neurology discussed with Dr. Macey Murphy and they plan to start steroids. Continue to monitor AMS. Neurology consult pending. 4/16 Continues on solu-medrol 500mg BID - no significant change, apparently more somnolent/less arousable per neurology. Possible EEG per neurology.   agrees to DNR status.     Pancytopenia related to chemo  -Hgb 6.1-transfuse one unit  4/16 WBC down to 0.2 - , start Granix. Will need PRBCs today for Hgb 6.3 Plts up after transfusion yesterday.     Acute on chronic kidney disease  -Cr 2.10 on D51/2 NS at 77  4/15 improving to 1.70.   4/16 Cr stable at 1.7, nephrostomy with 250cc output     Electrolyte abnormalities  - Kirby SOPs  4/16 Na up to 151, Cl 125, CO2 16 - will change IVF to D5. Continue KCl replacement.     Concern for UTI  - received one time dose Rocephin now on cefe  - follow culture   4/16 Urine culture with mixed skin froylan. Continues on Cefepime. Afebrile but quite neutropenic. Wounds  - nephrostomy tube noted to have drainage around it on arrival, an abdominal incision with pus, and some peeling areas in the gluteal fold. Wound care has been consulted for further recommendations. 4/16 Wound care following    Disposition: Conversations will continue to occur about goals of care depending on clinical course. Dr. Eduardo Vázquez discussed with patient's  today that we will need to make further decisions about nutritional support over the weekend. He agrees to a DNR status today. Consulting social work about social/financial concerns. Goals and plan of care reviewed with the patient. All questions answered to the best of our ability.            Annabelle Pacheco  Hematology & Oncology  [address] [place] [address]  Office : [phone]  Fax : [phone]

## 2022-04-16 NOTE — PROGRESS NOTES
Pt to receive 1 unit prbcs for hgb 6.3  T&S current  Consent signed per  & in chart  KCl 40meq per charlie protocol  Pt only responsive to noxious stimuli throughout shift    Shift report given to jerry Cornejo RN    Patient Vitals for the past 12 hrs:   Temp Pulse Resp BP SpO2   04/16/22 0758 98.9 °F (37.2 °C) (!) 104 18 132/85 99 %   04/16/22 0300 98.7 °F (37.1 °C) 99 16 123/83 98 %   04/15/22 2328 98.5 °F (36.9 °C) (!) 107 16 114/67 98 %

## 2022-04-17 NOTE — PROGRESS NOTES
[facility] Hematology & Oncology        Inpatient Hematology / Oncology Progress Note      Admission Date: 2022  7:08 PM  Reason for Admission/Hospital Course: AMS (altered mental status) [R41.82]      24 Hour Events:  Afeb, VSS  Remains unresponsive to tactile/verbal stimuli  Snoring respirations  Counts dropping - on Granix   at bedside    Transfusions - Plts  Replacements - None    ROS:  Unable to assess as pt not very responsive. She does not speak during rounding. 10 point review of systems is otherwise negative with the exception of the elements mentioned above in the HPI. Allergies   Allergen Reactions    Doxycycline Rash     Other reaction(s): Rash-Allergy    Fentanyl Rash     Other reaction(s): Rash-Allergy    Morphine Rash     Other reaction(s): Rash-Allergy       OBJECTIVE:  Patient Vitals for the past 8 hrs:   BP Temp Pulse Resp SpO2 Weight   22 0723 103/88 99.2 °F (37.3 °C) 63 24 90 % --   22 0326 (!) 105/49 99.2 °F (37.3 °C) 70 17 94 % 130 lb 8 oz (59.2 kg)     Temp (24hrs), Av.9 °F (37.2 °C), Min:98.1 °F (36.7 °C), Max:99.8 °F (37.7 °C)    No intake/output data recorded. Physical Exam:  Constitutional: Ill apeearing female in no acute distress, sitting comfortably in the hospital bed. HEENT: +dry mucous membranes. Normocephalic and atraumatic. No oral exam performed. supple without JVD. No thyromegaly present. Lymph node   Deferred    Skin Warm and dry. +nephrostomy tube with dressing. Respiratory Snoring respirations. Lungs are clear to auscultation bilaterally without wheezes, rales or rhonchi, normal air exchange without accessory muscle use. CVS Normal rate, regular rhythm and normal S1 and S2. No murmurs, gallops, or rubs. Abdomen Soft, nontender and nondistended, normoactive bowel sounds. No palpable mass. No hepatosplenomegaly. +R nephrostomy tube    Neuro Unable to fully assess as pt is not responsive.  She does not speak at all during exam.    MSK Normal range of motion in general.  No edema and no tenderness. Psych Appropriate mood and affect. Labs:      Recent Labs     04/17/22  0312 04/16/22  0325 04/15/22  0521 04/14/22  1642 04/14/22  1642   WBC 0.1* 0.2* 1.0*   < > 1.8*   RBC 2.93* 2.37* 3.12*   < > 2.91*   HGB 7.6* 6.3* 8.0*   < > 7.7*   HCT 22.9* 19.2* 25.4*   < > 23.8*   MCV 78.2* 81.0 81.4   < > 81.8   MCH 25.9* 26.6 25.6*   < > 26.5   MCHC 33.2 32.8 31.5   < > 32.4   RDW 19.7* 18.6* 18.6*   < > 18.2*   PLT 8* 31* 11*   < > 16*   GRANS  --  29* 67  --  84*   LYMPH  --  71* 27  --  13   MONOS  --  0* 0*  --  0*   EOS  --  0* 1  --  1   BASOS  --  0 2  --  0   IG  --  0 3  --  2   DF PENDING AUTOMATED AUTOMATED   < > AUTOMATED   ANEU  --  0.1* 0.7*  --  1.6*   ABL  --  0.1* 0.3*  --  0.2*   ABM  --  0.0* 0.0*  --  0.0*   DOMENICA  --  0.0 0.0  --  0.0   ABB  --  0.0 0.0  --  0.0   AIG  --  0.0 0.0  --  0.0    < > = values in this interval not displayed.         Recent Labs     04/17/22  0312 04/16/22  0325 04/15/22  0521   * 151* 148*   K 3.8 3.2* 3.4*   * 125* 122*   CO2 17* 16* 12*   AGAP 8 10 14   * 200* 90   BUN 47* 47* 42*   CREA 1.60* 1.70* 1.70*   GFRAA 41* 38* 38*   GFRNA 34* 32* 32*   CA 8.9 8.9 9.2   AP 54 46* 53   TP 5.5* 5.3* 6.2*   ALB 1.7* 1.7* 2.0*   GLOB 3.8* 3.6* 4.2*   AGRAT 0.4* 0.5* 0.5*         Imaging:    Medications:  Current Facility-Administered Medications   Medication Dose Route Frequency    glycopyrrolate (ROBINUL) injection 0.1 mg  0.1 mg IntraVENous TID    0.9% sodium chloride infusion 250 mL  250 mL IntraVENous PRN    dextrose 5% infusion  100 mL/hr IntraVENous CONTINUOUS    filgrastim-aafi (NIVESTYM) injection syringe 300 mcg  5 mcg/kg SubCUTAneous DAILY    0.9% sodium chloride infusion 250 mL  250 mL IntraVENous PRN    0.9% sodium chloride infusion 250 mL  250 mL IntraVENous PRN    methylPREDNISolone (PF) (Solu-MEDROL) 500 mg in 0.9% sodium chloride 100 mL IVPB  500 mg IntraVENous BID    pantoprazole (PROTONIX) 40 mg in 0.9% sodium chloride 10 mL injection  40 mg IntraVENous DAILY    silver sulfADIAZINE (SILVADENE) 1 % topical cream   Topical DAILY    sodium chloride (NS) flush 5-40 mL  5-40 mL IntraVENous Q8H    sodium chloride (NS) flush 5-40 mL  5-40 mL IntraVENous PRN    acetaminophen (TYLENOL) tablet 650 mg  650 mg Oral Q6H PRN    Or    acetaminophen (TYLENOL) suppository 650 mg  650 mg Rectal Q6H PRN    polyethylene glycol (MIRALAX) packet 17 g  17 g Oral DAILY PRN    ondansetron (ZOFRAN ODT) tablet 4 mg  4 mg Oral Q8H PRN    Or    ondansetron (ZOFRAN) injection 4 mg  4 mg IntraVENous Q6H PRN    cefepime (MAXIPIME) 1 g in 0.9% sodium chloride (MBP/ADV) 50 mL MBP  1 g IntraVENous Q24H    sodium chloride (NS) flush 5-10 mL  5-10 mL IntraVENous Q8H    sodium chloride (NS) flush 5-10 mL  5-10 mL IntraVENous PRN    0.9% sodium chloride infusion 250 mL  250 mL IntraVENous PRN         ASSESSMENT:    Problem List  Date Reviewed: 4/11/2022          Codes Class Noted    Leukoencephalopathy ICD-10-CM: G93.49  ICD-9-CM: 323.9  4/15/2022        Adult failure to thrive ICD-10-CM: R62.7  ICD-9-CM: 783.7  4/15/2022        Hypernatremia ICD-10-CM: E87.0  ICD-9-CM: 276.0  4/15/2022        Acute metabolic encephalopathy GJQ-87-HU: G93.41  ICD-9-CM: 348.31  4/77/4739        Metabolic acidosis, normal anion gap (NAG) ICD-10-CM: E87.2  ICD-9-CM: 276.2  4/14/2022        Stroke ([facility] ICD-10-CM: I63.9  ICD-9-CM: 434.91  4/14/2022        Thrombocytopenia (HCC) ICD-10-CM: D69.6  ICD-9-CM: 287.5  4/13/2022        Pancytopenia ([facility] ICD-10-CM: R56.181  ICD-9-CM: 284.19  4/13/2022        Altered mental status ICD-10-CM: R41.82  ICD-9-CM: 780.97  4/13/2022        * (Principal) SIRS (systemic inflammatory response syndrome) ([facility] ICD-10-CM: R65.10  ICD-9-CM: 995.90  4/13/2022        AMS (altered mental status) ICD-10-CM: S76.13  ICD-9-CM: 780.97  4/13/2022        Acute kidney injury ([facility] ICD-10-CM: N17.9  ICD-9-CM: 584.9  4/13/2022        Elevated serum creatinine ICD-10-CM: R79.89  ICD-9-CM: 790.99  4/4/2022        Acute renal failure (ARF) (HCC) ICD-10-CM: N17.9  ICD-9-CM: 584.9  2/23/2022        UTI (urinary tract infection) ICD-10-CM: N39.0  ICD-9-CM: 599.0  2/23/2022        Kidney congenitally absent, 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-CM: 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  ICD-9-CM: 753.0  2/23/2022        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 ([facility] ICD-10-CM: N13.30, C67.9  ICD-9-CM: 591, 188.9  2/23/2022        Tobacco use ICD-10-CM: Z72.0  ICD-9-CM: 305.1  8/9/4451        Systolic CHF, chronic (HCC) ICD-10-CM: I50.22  ICD-9-CM: 428.22, 428.0  2/2/2022    Overview Signed 2/2/2022  8:06 AM by Monica Reyes MD     Echo 2019 [place] 45% with no severe valvular pathology             THAO (obstructive sleep apnea) ICD-10-CM: G47.33  ICD-9-CM: 327.23  2/2/2022        Hematuria ICD-10-CM: R31.9  ICD-9-CM: 599.70  12/30/2021        Bladder cancer ([facility] ICD-10-CM: C67.9  ICD-9-CM: 188.9  12/30/2021        Carpal tunnel syndrome of right wrist ICD-10-CM: G56.01  ICD-9-CM: 354.0  9/26/2019    Overview Signed 3/17/2022  3:55 PM by Whitney Paget, MD     Last Assessment & Plan:   Formatting of this note might be different from the original.  Continue naprosyn and wrist split started today. Pt will try this for 2-3 weeks. If not resolving we will refer to the hand center. Primary insomnia ICD-10-CM: F51.01  ICD-9-CM: 307.42  11/20/2017    Overview Signed 3/17/2022  3:55 PM by Whitney Paget, MD     Last Assessment & Plan:   Formatting of this note might be different from the original.  Chronic induction and maintenance insomnia. Chronic sleep deprivation and poor sleep hygiene. 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 Patient is undergoing reevaluation for previously diagnosed THAO and CPAP therapy will potentially amplify her already severe insomnia.  Plan: Ambien 5 mg daily at bedtime, emphasized sleep hygiene. Left ventricular diastolic dysfunction with preserved systolic function MIS-36-HI: I51.89  ICD-9-CM: 429.9  12/19/2016    Overview Signed 3/17/2022  3:55 PM by Romain Stephens MD     Last Assessment & Plan:   Formatting of this note might be different from the original.  Grade 1 diastolic dysfunction on last ECHO. Patient has f/u with pcp in 2 weeks. Encouraged to review symptoms of shortness of breath and orthopnea with pcp to see if further evaluation is indicated. Irregular heart beat ICD-10-CM: I49.9  ICD-9-CM: 427.9  2/29/2016    Overview Signed 3/17/2022  3:55 PM by Romain Stephens MD     Last Assessment & Plan:   Formatting of this note might be different from the original.  ECG showed a regular rate and rhythm. I discussed the patient with Dr. Indira Salazar, and he agreed that no further intervention is indicated at this time. Patient is advised to follow up if symptoms develop or if occuring more frequently, I will set her up for a holter monitor at that point. Otherwise, we will continue her current regimen as prescribed. Hyperlipidemia ICD-10-CM: E78.5  ICD-9-CM: 272.4  2/12/2016    Overview Signed 3/17/2022  3:55 PM by Romain Stephens MD     Last Assessment & Plan:   Formatting of this note might be different from the original.  Well controlled with Simvastatin 20 mg. Continue current medications and work on diet with exercise. PLAN:    Bladder cancer sp cycle one carbo/gem 4/5 and 4/12     Encephalopathy/mild left facial droop/left eye closed  -CT head without contrast negative  -MRI brain without contrast pending  -Neurology consulted  4/15 Brain MRI with concern for diffuse leukoencephalopathy as well as noted moderate size acute or early subacute infarction in the right occipital lobe with mild local mass effect and trace petechial hemorrhage.  Neurology discussed with Dr. Bhargav Baltazar and they plan to start steroids. Continue to monitor AMS. Neurology consult pending. 4/16 Continues on solu-medrol 500mg BID - no significant change, apparently more somnolent/less arousable per neurology. Possible EEG per neurology.  agrees to DNR status. 4/17 Remains unresponsive, continues on steroids per neurology. If no progress in neuro status seen in the next couple days, will need to readdress goals of care. Snoring respirations - starting robinul for secretions.     Pancytopenia related to chemo  -Hgb 6.1-transfuse one unit  4/16 WBC down to 0.2 - , start Granix. Will need PRBCs today for Hgb 6.3 Plts up after transfusion yesterday. 4/17 WBC 0.1 - continues on Granix. Plt transfusion today.     Acute on chronic kidney disease  -Cr 2.10 on D51/2 NS at 77  4/15 improving to 1.70.   4/16 Cr stable at 1.7, nephrostomy with 250cc output  4/17 Cr stable.     Electrolyte abnormalities  - [facility] SOPs  4/16 Na up to 151, Cl 125, CO2 16 - will change IVF to D5. Continue KCl replacement. 4/17 Na down to 149, Cl 124. Continue D5. CrCa 10. 7.     Concern for UTI  - received one time dose Rocephin now on cefe  - follow culture   4/16 Urine culture with mixed skin froylan. Continues on Cefepime. Afebrile but quite neutropenic. 4/17 Continues Cefepime. Wounds  - nephrostomy tube noted to have drainage around it on arrival, an abdominal incision with pus, and some peeling areas in the gluteal fold. Wound care has been consulted for further recommendations. 4/16 Wound care following    Disposition: Conversations will continue to occur about goals of care depending on clinical course - likely in 24-48 hours. Goals and plan of care reviewed with the patient. All questions answered to the best of our ability.            DK Burnett. [address] Hematology & Oncology  [address] [address]  Office : [phone]  Fax : [phone]

## 2022-04-17 NOTE — PROGRESS NOTES
Neurology Daily Progress Note     Assessment:     72-year-old woman with an acute toxic leukoencephalopathy and stroke. Overall, stroke is playing a minor role in her symptomatology. Plan:     Continue IV Solu-Medrol    Consider on alternative antibiotic. Cefepime can independently cause neurotoxicity    EEG tomorrow    Unfortunately, I suspect a very poor neurological prognosis     Subjective: Interval history:    Obtunded. Does not respond to verbal stimulation.  is at bedside and is very supportive    History:    Molly Humphreys is a 73 y.o. female who is being seen for toxic leukoencephalopathy    Review of systems negative with exception of pertinent positives and negatives noted above.        Objective:     Vitals:    04/16/22 1854 04/17/22 0122 04/17/22 0326 04/17/22 0723   BP: 109/86 112/77 (!) 105/49 103/88   Pulse: (!) 105 (!) 109 70 63   Resp: 19 18 17 24   Temp: 99.4 °F (37.4 °C) 98.1 °F (36.7 °C) 99.2 °F (37.3 °C) 99.2 °F (37.3 °C)   SpO2: 96% 94% 94% 90%   Weight:   130 lb 8 oz (59.2 kg)    Height:              Current Facility-Administered Medications:     glycopyrrolate (ROBINUL) injection 0.1 mg, 0.1 mg, IntraVENous, TID, Jacqueline German FNP    diphenhydrAMINE (BENADRYL) injection 25 mg, 25 mg, IntraVENous, ONCE PRN, TABATHA MonterrosoP    0.9% sodium chloride infusion 250 mL, 250 mL, IntraVENous, PRN, Orlando Tsang MD    dextrose 5% infusion, 100 mL/hr, IntraVENous, CONTINUOUS, SHAWNA Monterroso, Last Rate: 100 mL/hr at 04/17/22 0326, 100 mL/hr at 04/17/22 0326    filgrastim-aafi (NIVESTYM) injection syringe 300 mcg, 5 mcg/kg, SubCUTAneous, DAILY, ATBATHA MonterrosoP, 300 mcg at 04/17/22 7089    0.9% sodium chloride infusion 250 mL, 250 mL, IntraVENous, PRN, Orlando Tsang MD    0.9% sodium chloride infusion 250 mL, 250 mL, IntraVENous, PRN, Nishant Jorge MD    methylPREDNISolone (PF) (Solu-MEDROL) 500 mg in 0.9% sodium chloride 100 mL IVPB, 500 mg, IntraVENous, BID, Ramon Angeles DO, Last Rate: 200 mL/hr at 04/16/22 1706, 500 mg at 04/16/22 1706    pantoprazole (PROTONIX) 40 mg in 0.9% sodium chloride 10 mL injection, 40 mg, IntraVENous, DAILY, Ramon Angeles DO, 40 mg at 04/17/22 3215    silver sulfADIAZINE (SILVADENE) 1 % topical cream, , Topical, DAILY, Damian Ellis MD, Given at 04/16/22 0900    sodium chloride (NS) flush 5-40 mL, 5-40 mL, IntraVENous, Q8H, Lupe West MD, 10 mL at 04/16/22 1508    sodium chloride (NS) flush 5-40 mL, 5-40 mL, IntraVENous, PRN, Lupe West MD    acetaminophen (TYLENOL) tablet 650 mg, 650 mg, Oral, Q6H PRN **OR** acetaminophen (TYLENOL) suppository 650 mg, 650 mg, Rectal, Q6H PRN, Lupe West MD    polyethylene glycol (MIRALAX) packet 17 g, 17 g, Oral, DAILY PRN, Lupe West MD    ondansetron (ZOFRAN ODT) tablet 4 mg, 4 mg, Oral, Q8H PRN **OR** ondansetron (ZOFRAN) injection 4 mg, 4 mg, IntraVENous, Q6H PRN, Lupe West MD    cefepime (MAXIPIME) 1 g in 0.9% sodium chloride (MBP/ADV) 50 mL MBP, 1 g, IntraVENous, Q24H, Victorino Herrera DO, Last Rate: 100 mL/hr at 04/17/22 0922, 1 g at 04/17/22 0210    sodium chloride (NS) flush 5-10 mL, 5-10 mL, IntraVENous, Q8H, Yvrose Vasquez MD, 10 mL at 04/16/22 2046    sodium chloride (NS) flush 5-10 mL, 5-10 mL, IntraVENous, PRN, Yvrose Partida MD    0.9% sodium chloride infusion 250 mL, 250 mL, IntraVENous, PRN, Yvrose Partida MD    Recent Results (from the past 12 hour(s))   CBC WITH AUTOMATED DIFF    Collection Time: 04/17/22  3:12 AM   Result Value Ref Range    WBC 0.1 (LL) 4.3 - 11.1 K/uL    RBC 2.93 (L) 4.05 - 5.2 M/uL    HGB 7.6 (L) 11.7 - 15.4 g/dL    HCT 22.9 (L) 35.8 - 46.3 %    MCV 78.2 (L) 79.6 - 97.8 FL    MCH 25.9 (L) 26.1 - 32.9 PG    MCHC 33.2 31.4 - 35.0 g/dL    RDW 19.7 (H) 11.9 - 14.6 %    PLATELET 8 (LL) 052 - 450 K/uL    MPV 9.2 (L) 9.4 - 12.3 FL    ABSOLUTE NRBC 0.00 0.0 - 0.2 K/uL    DF PENDING    METABOLIC PANEL, COMPREHENSIVE    Collection Time: 04/17/22  3:12 AM   Result Value Ref Range    Sodium 149 (H) 136 - 145 mmol/L    Potassium 3.8 3.5 - 5.1 mmol/L    Chloride 124 (H) 98 - 107 mmol/L    CO2 17 (L) 21 - 32 mmol/L    Anion gap 8 7 - 16 mmol/L    Glucose 132 (H) 65 - 100 mg/dL    BUN 47 (H) 8 - 23 MG/DL    Creatinine 1.60 (H) 0.6 - 1.0 MG/DL    GFR est AA 41 (L) >60 ml/min/1.73m2    GFR est non-AA 34 (L) >60 ml/min/1.73m2    Calcium 8.9 8.3 - 10.4 MG/DL    Bilirubin, total 0.7 0.2 - 1.1 MG/DL    ALT (SGPT) 17 12 - 65 U/L    AST (SGOT) 68 (H) 15 - 37 U/L    Alk. phosphatase 54 50 - 136 U/L    Protein, total 5.5 (L) 6.3 - 8.2 g/dL    Albumin 1.7 (L) 3.2 - 4.6 g/dL    Globulin 3.8 (H) 2.3 - 3.5 g/dL    A-G Ratio 0.4 (L) 1.2 - 3.5     PLATELETS, ALLOCATE    Collection Time: 04/17/22  4:15 AM   Result Value Ref Range    Unit number [id number]     Blood [facility]LR[facility]     Unit division 00     Status of unit ALLOCATED          Physical Exam:    General: Obtunded  HEENT - Normocephalic, atraumatic. Conjunctiva, tympanic membranes, and oropharynx are clear. Neck - Supple without masses, no bruits   Cardiovascular - Regular rate and rhythm. Normal S1, S2 without murmurs, rubs, or gallops. Lungs - Clear to auscultation. Abdomen - Soft, nontender with normal bowel sounds. Extremities - Peripheral pulses intact. No edema and no rashes.      Neurological examination -     Comprehension is poor. Attention is poor. She is currently nonverbal.  Minor facial droop on the left. Language and speech cannot be assessed. On cranial nerve examination pupils are equal round and reactive to light. She could not participate in funduscopic examination, visual acuity, visual field testing, or extraocular motility testing due to altered mental status. Motor examination: On motor examination, the patient has increased muscle tone throughout with continuous clonus in the feet, bilaterally.   She cannot participate in strength testing. Muscle stretch reflexes are absent in the bilateral lower extremities and 1+ at the brachioradialis, bilaterally. Toes are downgoing to plantar stimulation.   She cannot participate in sensory examination, cerebellar examination, gait or stance    Signed By: Nikita Bermudez DO     April 17, 2022

## 2022-04-17 NOTE — PROGRESS NOTES
Pt minimally responsive throughout shift  Wound care per orders  Pt will need 1 unit plt per charlie for plt count of 8  Plts not ready at end of this RNs shift  Corrected Ca 10.7 - Oncology NP notified    Shift report given to jerry Garcia RN    Patient Vitals for the past 12 hrs:   Temp Pulse Resp BP SpO2   04/17/22 0326 99.2 °F (37.3 °C) 70 17 (!) 105/49 94 %   04/17/22 0122 98.1 °F (36.7 °C) (!) 109 18 112/77 94 %   04/16/22 1854 99.4 °F (37.4 °C) (!) 105 19 109/86 96 %

## 2022-04-17 NOTE — PROGRESS NOTES
EOS:    - pt only minimally responsive to pain  - several loose BMs.  Excoriation on vaginal and rectum area very painful for pt when cleaning her   - pt turned frequently during shift  - robinul ordered for secretions and given per orders

## 2022-04-17 NOTE — PROGRESS NOTES
Problem: Pressure Injury - Risk of  Goal: *Prevention of pressure injury  Description: Document Samir Scale and appropriate interventions in the flowsheet.   Outcome: Progressing Towards Goal  Note: Pressure Injury Interventions:  Sensory Interventions: Assess changes in LOC,Assess need for specialty bed,Check visual cues for pain,Keep linens dry and wrinkle-free,Minimize linen layers,Pad between skin to skin,Pressure redistribution bed/mattress (bed type)    Moisture Interventions: Absorbent underpads,Apply protective barrier, creams and emollients,Assess need for specialty bed,Check for incontinence Q2 hours and as needed,Internal/External urinary devices,Limit adult briefs,Maintain skin hydration (lotion/cream),Minimize layers,Moisture barrier    Activity Interventions: Assess need for specialty bed,Pressure redistribution bed/mattress(bed type)    Mobility Interventions: Assess need for specialty bed,Pressure redistribution bed/mattress (bed type)    Nutrition Interventions: Document food/fluid/supplement intake    Friction and Shear Interventions: Apply protective barrier, creams and emollients,Foam dressings/transparent film/skin sealants,HOB 30 degrees or less,Minimize layers

## 2022-04-18 NOTE — PROGRESS NOTES
Problem: Falls - Risk of  Goal: *Absence of Falls  Description: Document Jillian Baig Fall Risk and appropriate interventions in the flowsheet. Outcome: Not Progressing Towards Goal     Problem: Patient Education: Go to Patient Education Activity  Goal: Patient/Family Education  Outcome: Not Progressing Towards Goal     Problem: Pressure Injury - Risk of  Goal: *Prevention of pressure injury  Description: Document Samir Scale and appropriate interventions in the flowsheet.   Outcome: Not Progressing Towards Goal     Problem: Patient Education: Go to Patient Education Activity  Goal: Patient/Family Education  Outcome: Not Progressing Towards Goal

## 2022-04-18 NOTE — PROGRESS NOTES
's follow-up visit this morning. Ms. Brandon Bruce is on comfort measures and she has a hospice consult. Spiritual Care has been following for days; however, this was my first visit with patient. Upon arrival to the room Ms. Brandon Bruce appeared asleep. I visited with Edilma Joshi, patient's boyfriend/life partner. The couple have been in a relationship for over 20 years. Radha Mallory is reflective about Ms. Brandon Bruce, their [facility] ousmane, and next steps in patient's care. I offered several spiritual interventions including affirmation of ousmane & emotions, exploration coping mechanisms & support system, and prayer as requested. Luis expressed his appreciation for the the visit. Chaplains remain available for support.       Robert Garcia 70  Board Certified

## 2022-04-18 NOTE — ETHICS
Clinical Ethics Consultation Note:    History and Context:    Unfortunate situation of a 72 y/o patient approaching end of life, eligible for hospice. Patient does not have decision-making capacity or an identified next of kin to consent to hospice    Principal Problem:    SIRS (systemic inflammatory response syndrome) ([address] (4/13/2022)    Active Problems:    Hematuria (12/30/2021)      Bladder cancer ([address] (12/30/2021)      UTI (urinary tract infection) (2/23/2022)      Thrombocytopenia ([address] (4/13/2022)      Pancytopenia ([address] (4/13/2022)      Altered mental status (4/13/2022)      AMS (altered mental status) (4/13/2022)      Acute kidney injury ([address] (4/13/2022)      Acute metabolic encephalopathy (8/09/2478)      Metabolic acidosis, normal anion gap (NAG) (4/14/2022)      Stroke ([address] (4/14/2022)      Leukoencephalopathy (4/15/2022)      Adult failure to thrive (4/15/2022)      Hypernatremia (4/15/2022)      Age: 73 y.o. Gender: F  Gender Identity: F  Admitted Date: 4/13/2022  Consult Date: 4/18/22  MRN: [id number]  Valid Adv Directive?: No    Process:  Received consult request from Dr. Yas Selby, hospice regarding appropriate decision-maker for patient to consent to hospice    Notified risk Adrian mgmt  Notified isaura Salguero mgmt    Ethical Question(s) and Concern(s):  Determine legal surrogate decision maker for unrepresented patient    Analysis:  Ms. Ramiro Dyson is an oncology patient with several active medical problems. The recommended care plan at this time is hospice. Ms. Ramiro Dyson does not have the ability to give informed consent due to altered mental status. There is some concern that the patient's SO, Mr. Iraida Brown, is also not capable of making informed decisions. Because Mr. Iraida Brown is not the patient's spouse, he is not the legal next of kin and does not have the legal right to make decisions for the patient if a relative can be found.      Recommendations:  Case management should conduct a thorough people search to identify patient's next of kin. All efforts to locate family should be clearly documented in the EMR. If a family member is located, case management needs to make at least 2 attempts to contact the family member & document efforts in the EMR. If no NOK can be located or contacted, Mr. Kathy Godinez can act as surrogate as the person who best knows the patient and understands the patient's wishes. If Mr. Kathy Godinez does not have the emotional or intellectual capacity to fully understand the patient's condition and make an informed decision, he is not an appropriate surrogate decision-maker, although he can offer insight into what he thinks the patient would have wanted. All emergent medical decisions should be made by the physician with the patient's best interests in mind    For major end-of-life decisions with no identified appropriate decision-maker, ethics will convene an interdisciplinary meeting to discuss options and reach consensus on the best course of action for the patient. Ethics can be reached at [phone]    Closing: Thank you for including ethics in this patient's care. Please reach out to me at [phone] for additional support or to request an interdisciplinary care meeting on behalf of the patient.     Respectfully,    Purvis Skiff, MA, GILLIAN-C  [employer] Ethics Lead  N/A    Primary Ethical Themes: Unrepresented patient  Secondary Ethical Themes: End of life related,Clarify legal surrogate decision maker

## 2022-04-18 NOTE — PROGRESS NOTES
Consult to hospice placed. CM sent referral to [facility] with notation that if pt meets criteria the [facility] [place] would be the very best for pt in this social situation. CM will await to hear from the hospice RN liaison.

## 2022-04-18 NOTE — PROGRESS NOTES
MAXIMO spoke with the hospice liaison who stated that pt does meet criteria for [facility], however since there is no legal representative to sign consents pt cannot be d/c to [facility]. AMXIMO spoke with the case  to discuss and he verbalized the same. MAXIMO updated Sadaf Isaac NP. The hospice liaison updated pt's boyfriend. Pt is expected to remain IP until she expires. MAXIMO will continue to follow.

## 2022-04-18 NOTE — PROGRESS NOTES
Patient had increased O2 needs. Epsom and respiratory to bedside. Spoke to onc NP. NP spoke to  regarding comfort measures and  agreeable  Orders were comfort measures only.  0.5 dilaudid q2 for pain, tachypnea, and shortness of breathless  0.5 ativan q2 for restlessness, anxiety, and agitation    All other orders dc

## 2022-04-18 NOTE — HOSPICE
[facility]    1400 Liaison to bedside for evaluation for [facility]. Spoke with patient's long time partner Cirilo at bedside. He confirms that the goal is to keep patient comfortable. His thoughts are somewhat tangential, and he keeps circling back to his own sleep habits, and being tired during the day to the point of being unable to keep his eyes open. Attempts to redirect back to patient situation are not entirely successful. Liaison questioned Cirilo about legal next of kin for [facility]. He says \"everyone's gone,\" they have been together 32 years but never  and never had kids, Shahana's parents and siblings are . He says they never had kids because \"I have mental illness and she just takes care of me. \"  He is agreeable to moving Shahana to the hospice house if she is deemed stable by providers. He would have to ride in the ambulance with her as he has no transportation and no local support. Case reviewed with [facility] medical director Dr. Randy Valdez. Patient is eligible for GIP hospice care at [facility]. Although Dr. Yanira Grier does have concerns about patient being stable to transport to [facility], this issue is secondary to lack of healthcare decision maker to elect hospice care on patient's behalf, according to [facility] next of kin law. After discussion between Dr. Yanira Grier and hospice  Milan Carlos, determined [facility] unable to accept at [facility] due to lack of legal designee to sign hospice consents.     Thank you for this referral,  Clive Tenorio, RN, BSN  [facility] liaison  [phone]

## 2022-04-18 NOTE — PROGRESS NOTES
[facility] Hematology & Oncology        Inpatient Hematology / Oncology Progress Note      Admission Date: 2022  7:08 PM  Reason for Admission/Hospital Course: AMS (altered mental status) [R41.82]      24 Hour Events:  Comfort measures  Hospice consulted  Remains unresponsive to stimuli  Spouse at bedside  Appears comfortable    ROS:  Unable to assess pt unresponsive    10 point review of systems is otherwise negative with the exception of the elements mentioned above in the HPI. Allergies   Allergen Reactions    Doxycycline Rash     Other reaction(s): Rash-Allergy    Fentanyl Rash     Other reaction(s): Rash-Allergy    Morphine Rash     Other reaction(s): Rash-Allergy       OBJECTIVE:  Patient Vitals for the past 8 hrs:   BP Temp Pulse Resp SpO2   22 0749 (!) 82/61 99.1 °F (37.3 °C) (!) 28 16 96 %     Temp (24hrs), Av.5 °F (36.9 °C), Min:97.9 °F (36.6 °C), Max:99.1 °F (37.3 °C)    No intake/output data recorded. Physical Exam:  Constitutional: Ill apeearing female in no acute distress, lying comfortably in the hospital bed. HEENT: +dry mucous membranes. Normocephalic and atraumatic. Lymph node   Deferred    Skin Warm and dry. +nephrostomy tube with dressing. Respiratory Snoring respirations. Anterior wheezing   CVS Normal rate, regular rhythm and normal S1 and S2. No murmurs, gallops, or rubs. Abdomen Soft, nontender and nondistended, normoactive bowel sounds. No palpable mass. No hepatosplenomegaly.  +R nephrostomy tube    Neuro Unresponsive   MSK Unresponsive   Psych Unresponsive       Labs:      Recent Labs     22  0312 22  0325   WBC 0.1* 0.2*   RBC 2.93* 2.37*   HGB 7.6* 6.3*   HCT 22.9* 19.2*   MCV 78.2* 81.0   MCH 25.9* 26.6   MCHC 33.2 32.8   RDW 19.7* 18.6*   PLT 8* 31*   GRANS  --  29*   LYMPH  --  71*   MONOS  --  0*   EOS  --  0*   BASOS  --  0   IG  --  0   DF MANUAL AUTOMATED   ANEU  --  0.1*   ABL  --  0.1*   ABM  --  0.0*   DOMENICA  --  0.0   ABB --  0.0   AIG  --  0.0        Recent Labs     04/17/22  0312 04/16/22  0325   * 151*   K 3.8 3.2*   * 125*   CO2 17* 16*   AGAP 8 10   * 200*   BUN 47* 47*   CREA 1.60* 1.70*   GFRAA 41* 38*   GFRNA 34* 32*   CA 8.9 8.9   AP 54 46*   TP 5.5* 5.3*   ALB 1.7* 1.7*   GLOB 3.8* 3.6*   AGRAT 0.4* 0.5*         Imaging:    Medications:  Current Facility-Administered Medications   Medication Dose Route Frequency    glycopyrrolate (ROBINUL) injection 0.1 mg  0.1 mg IntraVENous TID    LORazepam (ATIVAN) injection 0.5 mg  0.5 mg IntraVENous Q2H PRN         ASSESSMENT:    Problem List  Date Reviewed: 4/11/2022          Codes Class Noted    Leukoencephalopathy ICD-10-CM: G93.49  ICD-9-CM: 323.9  4/15/2022        Adult failure to thrive ICD-10-CM: R62.7  ICD-9-CM: 783.7  4/15/2022        Hypernatremia ICD-10-CM: E87.0  ICD-9-CM: 276.0  4/15/2022        Acute metabolic encephalopathy HAM-24-JT: G93.41  ICD-9-CM: 348.31  7/04/1994        Metabolic acidosis, normal anion gap (NAG) ICD-10-CM: E87.2  ICD-9-CM: 276.2  4/14/2022        Stroke (HCC) ICD-10-CM: I63.9  ICD-9-CM: 434.91  4/14/2022        Thrombocytopenia (HCC) ICD-10-CM: D69.6  ICD-9-CM: 287.5  4/13/2022        Pancytopenia (HCC) ICD-10-CM: V09.247  ICD-9-CM: 284.19  4/13/2022        Altered mental status ICD-10-CM: R41.82  ICD-9-CM: 780.97  4/13/2022        * (Principal) SIRS (systemic inflammatory response syndrome) (HCC) ICD-10-CM: R65.10  ICD-9-CM: 995.90  4/13/2022        AMS (altered mental status) ICD-10-CM: R41.82  ICD-9-CM: 780.97  4/13/2022        Acute kidney injury ([facility] ICD-10-CM: N17.9  ICD-9-CM: 584.9  4/13/2022        Elevated serum creatinine ICD-10-CM: R79.89  ICD-9-CM: 790.99  4/4/2022        Acute renal failure (ARF) (HCC) ICD-10-CM: N17.9  ICD-9-CM: 584.9  2/23/2022        UTI (urinary tract infection) ICD-10-CM: N39.0  ICD-9-CM: 599.0  2/23/2022        Kidney congenitally absent, left ICD-10-CM: Q60.0  ICD-9-CM: 753.0  2/23/2022 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 [facility]) ICD-10-CM: N13.30, C67.9  ICD-9-CM: 591, 188.9  2/23/2022        Tobacco use ICD-10-CM: Z72.0  ICD-9-CM: 305.1  2/5/7955        Systolic CHF, chronic (HCC) ICD-10-CM: I50.22  ICD-9-CM: 428.22, 428.0  2/2/2022    Overview Signed 2/2/2022  8:06 AM by Obed Landry MD     Echo 2019 [place] 45% with no severe valvular pathology             THAO (obstructive sleep apnea) ICD-10-CM: G47.33  ICD-9-CM: 327.23  2/2/2022        Hematuria ICD-10-CM: R31.9  ICD-9-CM: 599.70  12/30/2021        Bladder cancer ([address] ICD-10-CM: C67.9  ICD-9-CM: 188.9  12/30/2021        Carpal tunnel syndrome of right wrist ICD-10-CM: G56.01  ICD-9-CM: 354.0  9/26/2019    Overview Signed 3/17/2022  3:55 PM by Ramirez Orr MD     Last Assessment & Plan:   Formatting of this note might be different from the original.  Continue naprosyn and wrist split started today. Pt will try this for 2-3 weeks. If not resolving we will refer to the hand center. Primary insomnia ICD-10-CM: F51.01  ICD-9-CM: 307.42  11/20/2017    Overview Signed 3/17/2022  3:55 PM by Ramirez Orr MD     Last Assessment & Plan:   Formatting of this note might be different from the original.  Chronic induction and maintenance insomnia. Chronic sleep deprivation and poor sleep hygiene. 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 Patient is undergoing reevaluation for previously diagnosed THAO and CPAP therapy will potentially amplify her already severe insomnia. Plan: Ambien 5 mg daily at bedtime, emphasized sleep hygiene.              Left ventricular diastolic dysfunction with preserved systolic function OHM-00-RZ: I51.89  ICD-9-CM: 429.9  12/19/2016    Overview Signed 3/17/2022  3:55 PM by Ramirez Orr MD     Last Assessment & Plan:   Formatting of this note might be different from the original.  Grade 1 diastolic dysfunction on last ECHO.  Patient has f/u with pcp in 2 weeks. Encouraged to review symptoms of shortness of breath and orthopnea with pcp to see if further evaluation is indicated. Irregular heart beat ICD-10-CM: I49.9  ICD-9-CM: 427.9  2/29/2016    Overview Signed 3/17/2022  3:55 PM by Richa Jimenez MD     Last Assessment & Plan:   Formatting of this note might be different from the original.  ECG showed a regular rate and rhythm. I discussed the patient with Dr. Nehal Ledesma, and he agreed that no further intervention is indicated at this time. Patient is advised to follow up if symptoms develop or if occuring more frequently, I will set her up for a holter monitor at that point. Otherwise, we will continue her current regimen as prescribed. Hyperlipidemia ICD-10-CM: E78.5  ICD-9-CM: 272.4  2/12/2016    Overview Signed 3/17/2022  3:55 PM by Richa Jimenez MD     Last Assessment & Plan:   Formatting of this note might be different from the original.  Well controlled with Simvastatin 20 mg. Continue current medications and work on diet with exercise. PLAN:    Bladder cancer sp cycle one carbo/gem 4/5 and 4/12     Encephalopathy/mild left facial droop/left eye closed  -CT head without contrast negative  -MRI brain without contrast pending  -Neurology consulted  4/15 Brain MRI with concern for diffuse leukoencephalopathy as well as noted moderate size acute or early subacute infarction in the right occipital lobe with mild local mass effect and trace petechial hemorrhage. Neurology discussed with Dr. Kristopher Solano and they plan to start steroids. Continue to monitor AMS. Neurology consult pending. 4/16 Continues on solu-medrol 500mg BID - no significant change, apparently more somnolent/less arousable per neurology. Possible EEG per neurology.  agrees to DNR status. 4/17 Remains unresponsive, continues on steroids per neurology.  If no progress in neuro status seen in the next couple days, will need to readdress goals of care. Snoring respirations - starting robinul for secretions.     Pancytopenia related to chemo  -Hgb 6.1-transfuse one unit  4/16 WBC down to 0.2 - , start Granix. Will need PRBCs today for Hgb 6.3 Plts up after transfusion yesterday. 4/17 WBC 0.1 - continues on Granix. Plt transfusion today.     Acute on chronic kidney disease  -Cr 2.10 on D51/2 NS at 75  4/15 improving to 1.70.   4/16 Cr stable at 1.7, nephrostomy with 250cc output  4/17 Cr stable.     Electrolyte abnormalities  - [place] SOPs  4/16 Na up to 151, Cl 125, CO2 16 - will change IVF to D5. Continue KCl replacement. 4/17 Na down to 149, Cl 124. Continue D5. CrCa 10. 7.     Concern for UTI  - received one time dose Rocephin now on cefe  - follow culture   4/16 Urine culture with mixed skin froylan. Continues on Cefepime. Afebrile but quite neutropenic. 4/17 Continues Cefepime. Wounds  - nephrostomy tube noted to have drainage around it on arrival, an abdominal incision with pus, and some peeling areas in the gluteal fold. Wound care has been consulted for further recommendations. 4/16 Wound care following    4/18/2022 Patient is now comfort measures. Hospice consult pending. Spouse is at bedside. CM following.             Vanessa Monroy, JUAN  [facility] Hematology & Oncology  [address] [facility] [address] [address] [address]  Office : [phone]  Fax : [phone]

## 2022-04-18 NOTE — PROGRESS NOTES
Patient is receiving comfort care measures.  at bedside. Gave dilaudid prn twice through the night for dyspnea. Improvement in audible crackles of breathing but still remains mildly labored in breathing. decorticate with upperlimbs. Will open eyes when head is moved but eyes do not focus on the person. Does not respond to voice     states he is worried about outcome and what he will do after.     Patient Vitals for the past 12 hrs:   Temp Pulse Resp BP SpO2   04/18/22 0354 -- 75 17 95/62 (!) 75 %   04/18/22 0140 -- -- -- -- (!) 78 %   04/17/22 2147 -- -- -- (!) 90/38 --   04/17/22 2028 -- (!) 107 18 117/64 92 %   04/17/22 2025 -- -- -- -- (!) 80 %   04/17/22 1922 97.9 °F (36.6 °C) (!) 110 18 92/80 98 %

## 2022-04-19 NOTE — PROGRESS NOTES
ADDENDUM TO PROGRESS NOTE FROM 4-15-22   This CM noted that pt does not have any family in the area. CM did not elaborate to state that CM spoke in depth with Mr. Ania Ocampo (pt's long-time boyfriend) and he stated that pt's parents and siblings are  and that she does not have any family. He stated \"I'm the only one she's got. \"  There is no POA and pt and Mr. Ania Ocampo are not legally . Pt does not have children.

## 2022-04-19 NOTE — PROGRESS NOTES
Pt  at approximately 0545 today.  provided pt's long-term boyfriend Amparo Dominguez with resources for [facility], [facility], and [facility] on [address]. Hospital staff has assisted with arranging cremation for pt.

## 2022-04-19 NOTE — PROGRESS NOTES
-pt kept comfortable throughout the night   -pt given dilaudid PRN and ativan PRN 3x for SOB  -pt non interactive  -significant other at bedside  -pt passed at [phone]  -[facility] notified for significant other  -Dr. Kelley Mims pronounced

## 2022-04-19 NOTE — DISCHARGE SUMMARY
Death Summary    Isadora Yancey  Admission date:  4/13/2022  Discharge date:      Admitting Diagnosis:  AMS (altered mental status) [R41.82]  Discharge Diagnosis:    Problem List as of 4/19/2022 Date Reviewed: 4/11/2022          Codes Class Noted - Resolved    Leukoencephalopathy ICD-10-CM: G93.49  ICD-9-CM: 323.9  4/15/2022 - Present        Adult failure to thrive ICD-10-CM: R62.7  ICD-9-CM: 783.7  4/15/2022 - Present        Hypernatremia ICD-10-CM: E87.0  ICD-9-CM: 276.0  4/15/2022 - Present        Acute metabolic encephalopathy DLF-28-BC: G93.41  ICD-9-CM: 348.31  4/14/2022 - Present        Metabolic acidosis, normal anion gap (NAG) ICD-10-CM: E87.2  ICD-9-CM: 276.2  4/14/2022 - Present        Stroke ([facility] ICD-10-CM: I63.9  ICD-9-CM: 434.91  4/14/2022 - Present        Thrombocytopenia ([facility] ICD-10-CM: D69.6  ICD-9-CM: 287.5  4/13/2022 - Present        Pancytopenia ([facility] ICD-10-CM: G53.967  ICD-9-CM: 284.19  4/13/2022 - Present        Altered mental status ICD-10-CM: R41.82  ICD-9-CM: 780.97  4/13/2022 - Present        * (Principal) SIRS (systemic inflammatory response syndrome) ([facility] ICD-10-CM: R65.10  ICD-9-CM: 995.90  4/13/2022 - Present        AMS (altered mental status) ICD-10-CM: R41.82  ICD-9-CM: 780.97  4/13/2022 - Present        Acute kidney injury ([facility] ICD-10-CM: N17.9  ICD-9-CM: 584.9  4/13/2022 - Present        Elevated serum creatinine ICD-10-CM: R79.89  ICD-9-CM: 790.99  4/4/2022 - Present        Acute renal failure (ARF) (HCC) ICD-10-CM: N17.9  ICD-9-CM: 584.9  2/23/2022 - Present        UTI (urinary tract infection) ICD-10-CM: N39.0  ICD-9-CM: 599.0  2/23/2022 - Present        Kidney congenitally absent, left ICD-10-CM: Q60.0  ICD-9-CM: 753.0  2/23/2022 - Present        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 ([facility] ICD-10-CM: N13.30, C67.9  ICD-9-CM: 591, 188.9  2/23/2022 - Present        Tobacco use ICD-10-CM: Z72.0  ICD-9-CM: 305.1  2/2/2022 - Present        Systolic CHF, chronic ([facility] ICD-10-CM: I50.22  ICD-9-CM: 428.22, 428.0  2/2/2022 - Present    Overview Signed 2/2/2022  8:06 AM by Idris Goodman MD     Echo 2019 [place] 45% with no severe valvular pathology             THAO (obstructive sleep apnea) ICD-10-CM: G47.33  ICD-9-CM: 327.23  2/2/2022 - Present        Hematuria ICD-10-CM: R31.9  ICD-9-CM: 599.70  12/30/2021 - Present        Bladder cancer ([facility] ICD-10-CM: C67.9  ICD-9-CM: 188.9  12/30/2021 - Present        Carpal tunnel syndrome of right wrist ICD-10-CM: G56.01  ICD-9-CM: 354.0  9/26/2019 - Present    Overview Signed 3/17/2022  3:55 PM by Rosmery Puente MD     Last Assessment & Plan:   Formatting of this note might be different from the original.  Continue naprosyn and wrist split started today. Pt will try this for 2-3 weeks. If not resolving we will refer to the hand center. Primary insomnia ICD-10-CM: F51.01  ICD-9-CM: 307.42  11/20/2017 - Present    Overview Signed 3/17/2022  3:55 PM by Rosmery Puente MD     Last Assessment & Plan:   Formatting of this note might be different from the original.  Chronic induction and maintenance insomnia. Chronic sleep deprivation and poor sleep hygiene. 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 Patient is undergoing reevaluation for previously diagnosed THAO and CPAP therapy will potentially amplify her already severe insomnia. Plan: Ambien 5 mg daily at bedtime, emphasized sleep hygiene. Left ventricular diastolic dysfunction with preserved systolic function ICT-78-JH: I51.89  ICD-9-CM: 429.9  12/19/2016 - Present    Overview Signed 3/17/2022  3:55 PM by Rosmery Puente MD     Last Assessment & Plan:   Formatting of this note might be different from the original.  Grade 1 diastolic dysfunction on last ECHO. Patient has f/u with pcp in 2 weeks.   Encouraged to review symptoms of shortness of breath and orthopnea with pcp to see if further evaluation is indicated. Irregular heart beat ICD-10-CM: I49.9  ICD-9-CM: 427.9  2/29/2016 - Present    Overview Signed 3/17/2022  3:55 PM by Chad Maldonado MD     Last Assessment & Plan:   Formatting of this note might be different from the original.  ECG showed a regular rate and rhythm. I discussed the patient with Dr. Kathryn Castano, and he agreed that no further intervention is indicated at this time. Patient is advised to follow up if symptoms develop or if occuring more frequently, I will set her up for a holter monitor at that point. Otherwise, we will continue her current regimen as prescribed. Hyperlipidemia ICD-10-CM: E78.5  ICD-9-CM: 272.4  2/12/2016 - Present    Overview Signed 3/17/2022  3:55 PM by Chad Maldonado MD     Last Assessment & Plan:   Formatting of this note might be different from the original.  Well controlled with Simvastatin 20 mg. Continue current medications and work on diet with exercise. RESOLVED: Constipation ICD-10-CM: K59.00  ICD-9-CM: 564.00  2/27/2022 - 2/27/2022        RESOLVED: Hypokalemia ICD-10-CM: E87.6  ICD-9-CM: 276.8  2/23/2022 - 2/27/2022        RESOLVED: Preop cardiovascular exam ICD-10-CM: Z01.810  ICD-9-CM: V72.81  2/2/2022 - 3/4/2022              Consultants:    Studies/Procedures:    Hospital course:  Ms. Magdalena Fierro is a 73 y.o. female patient known to Dr. Stefani Bowser that is receiving treatment for locally advanced and unresectable bladder cancer. She was sp cycle one gemcitabine and carboplatin 4/5 and day 8 gemzar on 4/12. She was brought in to ED on 4/13/22 for AMS. Her spouse states that she was normal when she went to bed the night prior to admission  then later became confused, somnolent, making very little sense in speaking. Per notes EMS arrived and patient only responsive to pain. CT head without was negative. Hgb 6.1, platelets 72V, WBC 2.6, ANC 2.2. K+3.1, Cr 2.10, LA 2.1 then recheck 1.7. CXR negative.  UA small leukocyte esterase, trace bacteria, negative nitrites. UC and BCs obtained which have remained negative. MRI brain without contrast noted moderate size acute or early subacute infarction in the right occipital lobe with mild local mass effect and trace petechial hemorrhage. Neurology consulted and started patient on solu medrol 500 mg bid with no significant changes noted. Concern of neurology was for acute leukoencephalopathy. Patient continued to decline and was showing no signs of improvement. She was made comfort measures on 4/17/2022. She passed away at [phone] with significant other at bedside. Final:  --Pronounced dead at 0545. --Total discharge greater than 30 minutes in duration.     Mortimer Rebel, JUAN

## 2022-04-19 NOTE — PROGRESS NOTES
responded to death. Long term boyfriend, Tye Caraballo, was at bedside and expressed appropriate grief. Boyfriend expressed theological and ousmane questions, which  discussed. Boyfriend consulted with staff with mortuary questions and social issues.  provided pastoral presence, prayer and empathetic listening.
